# Patient Record
Sex: MALE | Race: ASIAN | NOT HISPANIC OR LATINO | URBAN - METROPOLITAN AREA
[De-identification: names, ages, dates, MRNs, and addresses within clinical notes are randomized per-mention and may not be internally consistent; named-entity substitution may affect disease eponyms.]

---

## 2023-02-09 ENCOUNTER — INPATIENT (INPATIENT)
Facility: HOSPITAL | Age: 68
LOS: 8 days | Discharge: HOME HEALTH | DRG: 871 | End: 2023-02-18
Attending: INTERNAL MEDICINE | Admitting: INTERNAL MEDICINE
Payer: COMMERCIAL

## 2023-02-09 VITALS
OXYGEN SATURATION: 95 % | WEIGHT: 130.07 LBS | RESPIRATION RATE: 20 BRPM | HEIGHT: 68 IN | TEMPERATURE: 99 F | DIASTOLIC BLOOD PRESSURE: 62 MMHG | SYSTOLIC BLOOD PRESSURE: 91 MMHG | HEART RATE: 118 BPM

## 2023-02-09 PROCEDURE — 99285 EMERGENCY DEPT VISIT HI MDM: CPT

## 2023-02-09 PROCEDURE — 93010 ELECTROCARDIOGRAM REPORT: CPT

## 2023-02-09 RX ORDER — SODIUM CHLORIDE 9 MG/ML
1000 INJECTION INTRAMUSCULAR; INTRAVENOUS; SUBCUTANEOUS ONCE
Refills: 0 | Status: COMPLETED | OUTPATIENT
Start: 2023-02-09 | End: 2023-02-09

## 2023-02-09 NOTE — ED ADULT TRIAGE NOTE - CHIEF COMPLAINT QUOTE
Pt BIBA for generalized weakness, had flu symptoms 1 week ago and was vomiting that started 2 days ago. As per EMS fingerstick 584mg/dl . Pt has history of DM and bypass.

## 2023-02-10 DIAGNOSIS — E11.00 TYPE 2 DIABETES MELLITUS WITH HYPEROSMOLARITY WITHOUT NONKETOTIC HYPERGLYCEMIC-HYPEROSMOLAR COMA (NKHHC): ICD-10-CM

## 2023-02-10 LAB
ACETONE SERPL-MCNC: NEGATIVE — SIGNIFICANT CHANGE UP
ALBUMIN SERPL ELPH-MCNC: 1.8 G/DL — LOW (ref 3.5–5)
ALBUMIN SERPL ELPH-MCNC: 1.8 G/DL — LOW (ref 3.5–5)
ALBUMIN SERPL ELPH-MCNC: 2.5 G/DL — LOW (ref 3.5–5)
ALP SERPL-CCNC: 143 U/L — HIGH (ref 40–120)
ALP SERPL-CCNC: 145 U/L — HIGH (ref 40–120)
ALP SERPL-CCNC: 198 U/L — HIGH (ref 40–120)
ALT FLD-CCNC: 63 U/L DA — HIGH (ref 10–60)
ALT FLD-CCNC: 68 U/L DA — HIGH (ref 10–60)
ALT FLD-CCNC: 90 U/L DA — HIGH (ref 10–60)
AMMONIA BLD-MCNC: 53 UMOL/L — HIGH (ref 11–32)
ANION GAP SERPL CALC-SCNC: 10 MMOL/L — SIGNIFICANT CHANGE UP (ref 5–17)
ANION GAP SERPL CALC-SCNC: 11 MMOL/L — SIGNIFICANT CHANGE UP (ref 5–17)
ANION GAP SERPL CALC-SCNC: 13 MMOL/L — SIGNIFICANT CHANGE UP (ref 5–17)
ANION GAP SERPL CALC-SCNC: 14 MMOL/L — SIGNIFICANT CHANGE UP (ref 5–17)
ANION GAP SERPL CALC-SCNC: 5 MMOL/L — SIGNIFICANT CHANGE UP (ref 5–17)
ANION GAP SERPL CALC-SCNC: 5 MMOL/L — SIGNIFICANT CHANGE UP (ref 5–17)
ANION GAP SERPL CALC-SCNC: 6 MMOL/L — SIGNIFICANT CHANGE UP (ref 5–17)
APPEARANCE UR: CLEAR — SIGNIFICANT CHANGE UP
APTT BLD: 34.5 SEC — SIGNIFICANT CHANGE UP (ref 27.5–35.5)
AST SERPL-CCNC: 27 U/L — SIGNIFICANT CHANGE UP (ref 10–40)
AST SERPL-CCNC: 28 U/L — SIGNIFICANT CHANGE UP (ref 10–40)
AST SERPL-CCNC: 36 U/L — SIGNIFICANT CHANGE UP (ref 10–40)
BASE EXCESS BLDV CALC-SCNC: 8 MMOL/L — SIGNIFICANT CHANGE UP
BASOPHILS # BLD AUTO: 0 K/UL — SIGNIFICANT CHANGE UP (ref 0–0.2)
BASOPHILS NFR BLD AUTO: 0 % — SIGNIFICANT CHANGE UP (ref 0–2)
BILIRUB DIRECT SERPL-MCNC: 12.7 MG/DL — HIGH (ref 0–0.3)
BILIRUB DIRECT SERPL-MCNC: 8.6 MG/DL — HIGH (ref 0–0.3)
BILIRUB INDIRECT FLD-MCNC: 1.6 MG/DL — HIGH (ref 0.2–1)
BILIRUB INDIRECT FLD-MCNC: 1.8 MG/DL — HIGH (ref 0.2–1)
BILIRUB SERPL-MCNC: 10.2 MG/DL — HIGH (ref 0.2–1.2)
BILIRUB SERPL-MCNC: 10.2 MG/DL — HIGH (ref 0.2–1.2)
BILIRUB SERPL-MCNC: 10.6 MG/DL — HIGH (ref 0.2–1.2)
BILIRUB SERPL-MCNC: 14.5 MG/DL — HIGH (ref 0.2–1.2)
BILIRUB SERPL-MCNC: 14.5 MG/DL — HIGH (ref 0.2–1.2)
BILIRUB UR-MCNC: ABNORMAL
BUN SERPL-MCNC: 109 MG/DL — HIGH (ref 7–18)
BUN SERPL-MCNC: 113 MG/DL — HIGH (ref 7–18)
BUN SERPL-MCNC: 131 MG/DL — HIGH (ref 7–18)
BUN SERPL-MCNC: 82 MG/DL — HIGH (ref 7–18)
BUN SERPL-MCNC: 85 MG/DL — HIGH (ref 7–18)
BUN SERPL-MCNC: 90 MG/DL — HIGH (ref 7–18)
BUN SERPL-MCNC: 98 MG/DL — HIGH (ref 7–18)
CALCIUM SERPL-MCNC: 7 MG/DL — LOW (ref 8.4–10.5)
CALCIUM SERPL-MCNC: 7.3 MG/DL — LOW (ref 8.4–10.5)
CALCIUM SERPL-MCNC: 7.3 MG/DL — LOW (ref 8.4–10.5)
CALCIUM SERPL-MCNC: 7.4 MG/DL — LOW (ref 8.4–10.5)
CALCIUM SERPL-MCNC: 7.5 MG/DL — LOW (ref 8.4–10.5)
CALCIUM SERPL-MCNC: 8 MG/DL — LOW (ref 8.4–10.5)
CALCIUM SERPL-MCNC: 9.5 MG/DL — SIGNIFICANT CHANGE UP (ref 8.4–10.5)
CHLORIDE SERPL-SCNC: 110 MMOL/L — HIGH (ref 96–108)
CHLORIDE SERPL-SCNC: 112 MMOL/L — HIGH (ref 96–108)
CHLORIDE SERPL-SCNC: 119 MMOL/L — HIGH (ref 96–108)
CHLORIDE SERPL-SCNC: 119 MMOL/L — HIGH (ref 96–108)
CHLORIDE SERPL-SCNC: 121 MMOL/L — HIGH (ref 96–108)
CHLORIDE SERPL-SCNC: 123 MMOL/L — HIGH (ref 96–108)
CHLORIDE SERPL-SCNC: 98 MMOL/L — SIGNIFICANT CHANGE UP (ref 96–108)
CO2 SERPL-SCNC: 25 MMOL/L — SIGNIFICANT CHANGE UP (ref 22–31)
CO2 SERPL-SCNC: 25 MMOL/L — SIGNIFICANT CHANGE UP (ref 22–31)
CO2 SERPL-SCNC: 27 MMOL/L — SIGNIFICANT CHANGE UP (ref 22–31)
CO2 SERPL-SCNC: 28 MMOL/L — SIGNIFICANT CHANGE UP (ref 22–31)
CO2 SERPL-SCNC: 28 MMOL/L — SIGNIFICANT CHANGE UP (ref 22–31)
CO2 SERPL-SCNC: 29 MMOL/L — SIGNIFICANT CHANGE UP (ref 22–31)
CO2 SERPL-SCNC: 30 MMOL/L — SIGNIFICANT CHANGE UP (ref 22–31)
COLOR SPEC: YELLOW — SIGNIFICANT CHANGE UP
CREAT SERPL-MCNC: 1.95 MG/DL — HIGH (ref 0.5–1.3)
CREAT SERPL-MCNC: 2.09 MG/DL — HIGH (ref 0.5–1.3)
CREAT SERPL-MCNC: 2.25 MG/DL — HIGH (ref 0.5–1.3)
CREAT SERPL-MCNC: 2.42 MG/DL — HIGH (ref 0.5–1.3)
CREAT SERPL-MCNC: 2.9 MG/DL — HIGH (ref 0.5–1.3)
CREAT SERPL-MCNC: 3.12 MG/DL — HIGH (ref 0.5–1.3)
CREAT SERPL-MCNC: 3.84 MG/DL — HIGH (ref 0.5–1.3)
DIFF PNL FLD: ABNORMAL
EGFR: 16 ML/MIN/1.73M2 — LOW
EGFR: 21 ML/MIN/1.73M2 — LOW
EGFR: 23 ML/MIN/1.73M2 — LOW
EGFR: 28 ML/MIN/1.73M2 — LOW
EGFR: 31 ML/MIN/1.73M2 — LOW
EGFR: 34 ML/MIN/1.73M2 — LOW
EGFR: 37 ML/MIN/1.73M2 — LOW
EOSINOPHIL # BLD AUTO: 0 K/UL — SIGNIFICANT CHANGE UP (ref 0–0.5)
EOSINOPHIL NFR BLD AUTO: 0 % — SIGNIFICANT CHANGE UP (ref 0–6)
FERRITIN SERPL-MCNC: 566 NG/ML — HIGH (ref 30–400)
FOLATE SERPL-MCNC: >20 NG/ML — SIGNIFICANT CHANGE UP
GLUCOSE SERPL-MCNC: 1052 MG/DL — CRITICAL HIGH (ref 70–99)
GLUCOSE SERPL-MCNC: 283 MG/DL — HIGH (ref 70–99)
GLUCOSE SERPL-MCNC: 365 MG/DL — HIGH (ref 70–99)
GLUCOSE SERPL-MCNC: 479 MG/DL — CRITICAL HIGH (ref 70–99)
GLUCOSE SERPL-MCNC: 580 MG/DL — CRITICAL HIGH (ref 70–99)
GLUCOSE SERPL-MCNC: 757 MG/DL — CRITICAL HIGH (ref 70–99)
GLUCOSE SERPL-MCNC: 791 MG/DL — CRITICAL HIGH (ref 70–99)
GLUCOSE UR QL: 1000 MG/DL
HCO3 BLDV-SCNC: 31 MMOL/L — HIGH (ref 22–29)
HCT VFR BLD CALC: 39.2 % — SIGNIFICANT CHANGE UP (ref 39–50)
HCT VFR BLD CALC: 45.6 % — SIGNIFICANT CHANGE UP (ref 39–50)
HCV AB S/CO SERPL IA: 0.09 S/CO — SIGNIFICANT CHANGE UP (ref 0–0.99)
HCV AB SERPL-IMP: SIGNIFICANT CHANGE UP
HGB BLD-MCNC: 13.3 G/DL — SIGNIFICANT CHANGE UP (ref 13–17)
HGB BLD-MCNC: 15.5 G/DL — SIGNIFICANT CHANGE UP (ref 13–17)
HOROWITZ INDEX BLDV+IHG-RTO: 21 — SIGNIFICANT CHANGE UP
INR BLD: 1.11 RATIO — SIGNIFICANT CHANGE UP (ref 0.88–1.16)
IRON SATN MFR SERPL: 108 UG/DL — SIGNIFICANT CHANGE UP (ref 65–170)
IRON SATN MFR SERPL: 56 % — HIGH (ref 20–55)
KETONES UR-MCNC: ABNORMAL
LACTATE SERPL-SCNC: 2.1 MMOL/L — HIGH (ref 0.7–2)
LACTATE SERPL-SCNC: 2.2 MMOL/L — HIGH (ref 0.7–2)
LEUKOCYTE ESTERASE UR-ACNC: NEGATIVE — SIGNIFICANT CHANGE UP
LIDOCAIN IGE QN: 459 U/L — HIGH (ref 73–393)
LYMPHOCYTES # BLD AUTO: 1.24 K/UL — SIGNIFICANT CHANGE UP (ref 1–3.3)
LYMPHOCYTES # BLD AUTO: 14 % — SIGNIFICANT CHANGE UP (ref 13–44)
MAGNESIUM SERPL-MCNC: 1.8 MG/DL — SIGNIFICANT CHANGE UP (ref 1.6–2.6)
MAGNESIUM SERPL-MCNC: 2.2 MG/DL — SIGNIFICANT CHANGE UP (ref 1.6–2.6)
MAGNESIUM SERPL-MCNC: 2.4 MG/DL — SIGNIFICANT CHANGE UP (ref 1.6–2.6)
MAGNESIUM SERPL-MCNC: 2.8 MG/DL — HIGH (ref 1.6–2.6)
MCHC RBC-ENTMCNC: 31.5 PG — SIGNIFICANT CHANGE UP (ref 27–34)
MCHC RBC-ENTMCNC: 32.1 PG — SIGNIFICANT CHANGE UP (ref 27–34)
MCHC RBC-ENTMCNC: 33.9 GM/DL — SIGNIFICANT CHANGE UP (ref 32–36)
MCHC RBC-ENTMCNC: 34 GM/DL — SIGNIFICANT CHANGE UP (ref 32–36)
MCV RBC AUTO: 92.9 FL — SIGNIFICANT CHANGE UP (ref 80–100)
MCV RBC AUTO: 94.4 FL — SIGNIFICANT CHANGE UP (ref 80–100)
MONOCYTES # BLD AUTO: 0.98 K/UL — HIGH (ref 0–0.9)
MONOCYTES NFR BLD AUTO: 11 % — SIGNIFICANT CHANGE UP (ref 2–14)
MRSA PCR RESULT.: SIGNIFICANT CHANGE UP
NEUTROPHILS # BLD AUTO: 6.65 K/UL — SIGNIFICANT CHANGE UP (ref 1.8–7.4)
NEUTROPHILS NFR BLD AUTO: 75 % — SIGNIFICANT CHANGE UP (ref 43–77)
NITRITE UR-MCNC: NEGATIVE — SIGNIFICANT CHANGE UP
NRBC # BLD: 0 /100 WBCS — SIGNIFICANT CHANGE UP (ref 0–0)
NT-PROBNP SERPL-SCNC: 1228 PG/ML — HIGH (ref 0–125)
OSMOLALITY SERPL: 399 MOSMOL/KG — HIGH (ref 280–301)
PCO2 BLDV: 35 MMHG — LOW (ref 42–55)
PH BLDV: 7.55 — HIGH (ref 7.32–7.43)
PH UR: 5 — SIGNIFICANT CHANGE UP (ref 5–8)
PHOSPHATE SERPL-MCNC: 1.8 MG/DL — LOW (ref 2.5–4.5)
PHOSPHATE SERPL-MCNC: 2.7 MG/DL — SIGNIFICANT CHANGE UP (ref 2.5–4.5)
PHOSPHATE SERPL-MCNC: 5.1 MG/DL — HIGH (ref 2.5–4.5)
PLATELET # BLD AUTO: 55 K/UL — LOW (ref 150–400)
PLATELET # BLD AUTO: 55 K/UL — LOW (ref 150–400)
PO2 BLDV: 79 MMHG — SIGNIFICANT CHANGE UP
POTASSIUM SERPL-MCNC: 2.5 MMOL/L — CRITICAL LOW (ref 3.5–5.3)
POTASSIUM SERPL-MCNC: 2.6 MMOL/L — CRITICAL LOW (ref 3.5–5.3)
POTASSIUM SERPL-MCNC: 2.6 MMOL/L — CRITICAL LOW (ref 3.5–5.3)
POTASSIUM SERPL-MCNC: 3.1 MMOL/L — LOW (ref 3.5–5.3)
POTASSIUM SERPL-MCNC: 3.3 MMOL/L — LOW (ref 3.5–5.3)
POTASSIUM SERPL-MCNC: 3.6 MMOL/L — SIGNIFICANT CHANGE UP (ref 3.5–5.3)
POTASSIUM SERPL-MCNC: 6 MMOL/L — HIGH (ref 3.5–5.3)
POTASSIUM SERPL-SCNC: 2.5 MMOL/L — CRITICAL LOW (ref 3.5–5.3)
POTASSIUM SERPL-SCNC: 2.6 MMOL/L — CRITICAL LOW (ref 3.5–5.3)
POTASSIUM SERPL-SCNC: 2.6 MMOL/L — CRITICAL LOW (ref 3.5–5.3)
POTASSIUM SERPL-SCNC: 3.1 MMOL/L — LOW (ref 3.5–5.3)
POTASSIUM SERPL-SCNC: 3.3 MMOL/L — LOW (ref 3.5–5.3)
POTASSIUM SERPL-SCNC: 3.6 MMOL/L — SIGNIFICANT CHANGE UP (ref 3.5–5.3)
POTASSIUM SERPL-SCNC: 6 MMOL/L — HIGH (ref 3.5–5.3)
PROT SERPL-MCNC: 5.1 G/DL — LOW (ref 6–8.3)
PROT SERPL-MCNC: 5.1 G/DL — LOW (ref 6–8.3)
PROT SERPL-MCNC: 6.9 G/DL — SIGNIFICANT CHANGE UP (ref 6–8.3)
PROT UR-MCNC: 15
PROTHROM AB SERPL-ACNC: 13.2 SEC — SIGNIFICANT CHANGE UP (ref 10.5–13.4)
RBC # BLD: 4.22 M/UL — SIGNIFICANT CHANGE UP (ref 4.2–5.8)
RBC # BLD: 4.83 M/UL — SIGNIFICANT CHANGE UP (ref 4.2–5.8)
RBC # FLD: 12.8 % — SIGNIFICANT CHANGE UP (ref 10.3–14.5)
RBC # FLD: 12.9 % — SIGNIFICANT CHANGE UP (ref 10.3–14.5)
S AUREUS DNA NOSE QL NAA+PROBE: SIGNIFICANT CHANGE UP
SAO2 % BLDV: 95.8 % — SIGNIFICANT CHANGE UP
SARS-COV-2 RNA SPEC QL NAA+PROBE: SIGNIFICANT CHANGE UP
SODIUM SERPL-SCNC: 140 MMOL/L — SIGNIFICANT CHANGE UP (ref 135–145)
SODIUM SERPL-SCNC: 145 MMOL/L — SIGNIFICANT CHANGE UP (ref 135–145)
SODIUM SERPL-SCNC: 150 MMOL/L — HIGH (ref 135–145)
SODIUM SERPL-SCNC: 154 MMOL/L — HIGH (ref 135–145)
SODIUM SERPL-SCNC: 155 MMOL/L — HIGH (ref 135–145)
SODIUM SERPL-SCNC: 155 MMOL/L — HIGH (ref 135–145)
SODIUM SERPL-SCNC: 159 MMOL/L — HIGH (ref 135–145)
SP GR SPEC: 1.01 — SIGNIFICANT CHANGE UP (ref 1.01–1.02)
TIBC SERPL-MCNC: 191 UG/DL — LOW (ref 250–450)
TRANSFERRIN SERPL-MCNC: 162 MG/DL — LOW (ref 200–360)
TROPONIN I, HIGH SENSITIVITY RESULT: 42 NG/L — SIGNIFICANT CHANGE UP
TSH SERPL-MCNC: 0.08 UU/ML — LOW (ref 0.34–4.82)
UIBC SERPL-MCNC: 83 UG/DL — LOW (ref 110–370)
UROBILINOGEN FLD QL: 1
VIT B12 SERPL-MCNC: >2000 PG/ML — HIGH (ref 232–1245)
WBC # BLD: 7.51 K/UL — SIGNIFICANT CHANGE UP (ref 3.8–10.5)
WBC # BLD: 8.87 K/UL — SIGNIFICANT CHANGE UP (ref 3.8–10.5)
WBC # FLD AUTO: 7.51 K/UL — SIGNIFICANT CHANGE UP (ref 3.8–10.5)
WBC # FLD AUTO: 8.87 K/UL — SIGNIFICANT CHANGE UP (ref 3.8–10.5)

## 2023-02-10 PROCEDURE — 99291 CRITICAL CARE FIRST HOUR: CPT | Mod: GC

## 2023-02-10 PROCEDURE — 99222 1ST HOSP IP/OBS MODERATE 55: CPT

## 2023-02-10 PROCEDURE — 74176 CT ABD & PELVIS W/O CONTRAST: CPT | Mod: 26

## 2023-02-10 PROCEDURE — 71045 X-RAY EXAM CHEST 1 VIEW: CPT | Mod: 26

## 2023-02-10 RX ORDER — SODIUM CHLORIDE 9 MG/ML
1000 INJECTION, SOLUTION INTRAVENOUS
Refills: 0 | Status: DISCONTINUED | OUTPATIENT
Start: 2023-02-10 | End: 2023-02-10

## 2023-02-10 RX ORDER — POTASSIUM CHLORIDE 20 MEQ
40 PACKET (EA) ORAL EVERY 4 HOURS
Refills: 0 | Status: COMPLETED | OUTPATIENT
Start: 2023-02-10 | End: 2023-02-11

## 2023-02-10 RX ORDER — POTASSIUM CHLORIDE 20 MEQ
10 PACKET (EA) ORAL
Refills: 0 | Status: DISCONTINUED | OUTPATIENT
Start: 2023-02-10 | End: 2023-02-10

## 2023-02-10 RX ORDER — INSULIN LISPRO 100/ML
VIAL (ML) SUBCUTANEOUS EVERY 4 HOURS
Refills: 0 | Status: DISCONTINUED | OUTPATIENT
Start: 2023-02-10 | End: 2023-02-11

## 2023-02-10 RX ORDER — POTASSIUM CHLORIDE 20 MEQ
40 PACKET (EA) ORAL ONCE
Refills: 0 | Status: COMPLETED | OUTPATIENT
Start: 2023-02-10 | End: 2023-02-10

## 2023-02-10 RX ORDER — DEXTROSE 50 % IN WATER 50 %
25 SYRINGE (ML) INTRAVENOUS
Refills: 0 | Status: DISCONTINUED | OUTPATIENT
Start: 2023-02-10 | End: 2023-02-11

## 2023-02-10 RX ORDER — CHLORHEXIDINE GLUCONATE 213 G/1000ML
1 SOLUTION TOPICAL
Refills: 0 | Status: DISCONTINUED | OUTPATIENT
Start: 2023-02-10 | End: 2023-02-11

## 2023-02-10 RX ORDER — POTASSIUM CHLORIDE 20 MEQ
10 PACKET (EA) ORAL
Refills: 0 | Status: COMPLETED | OUTPATIENT
Start: 2023-02-10 | End: 2023-02-10

## 2023-02-10 RX ORDER — CEFTRIAXONE 500 MG/1
1000 INJECTION, POWDER, FOR SOLUTION INTRAMUSCULAR; INTRAVENOUS EVERY 24 HOURS
Refills: 0 | Status: COMPLETED | OUTPATIENT
Start: 2023-02-11 | End: 2023-02-15

## 2023-02-10 RX ORDER — POTASSIUM CHLORIDE 20 MEQ
10 PACKET (EA) ORAL ONCE
Refills: 0 | Status: COMPLETED | OUTPATIENT
Start: 2023-02-10 | End: 2023-02-10

## 2023-02-10 RX ORDER — CEFTRIAXONE 500 MG/1
INJECTION, POWDER, FOR SOLUTION INTRAMUSCULAR; INTRAVENOUS
Refills: 0 | Status: COMPLETED | OUTPATIENT
Start: 2023-02-10 | End: 2023-02-16

## 2023-02-10 RX ORDER — SODIUM CHLORIDE 9 MG/ML
1000 INJECTION, SOLUTION INTRAVENOUS
Refills: 0 | Status: DISCONTINUED | OUTPATIENT
Start: 2023-02-10 | End: 2023-02-11

## 2023-02-10 RX ORDER — ATORVASTATIN CALCIUM 80 MG/1
20 TABLET, FILM COATED ORAL AT BEDTIME
Refills: 0 | Status: DISCONTINUED | OUTPATIENT
Start: 2023-02-10 | End: 2023-02-12

## 2023-02-10 RX ORDER — SODIUM CHLORIDE 9 MG/ML
1000 INJECTION, SOLUTION INTRAVENOUS ONCE
Refills: 0 | Status: COMPLETED | OUTPATIENT
Start: 2023-02-10 | End: 2023-02-10

## 2023-02-10 RX ORDER — GLUCAGON INJECTION, SOLUTION 0.5 MG/.1ML
1 INJECTION, SOLUTION SUBCUTANEOUS ONCE
Refills: 0 | Status: DISCONTINUED | OUTPATIENT
Start: 2023-02-10 | End: 2023-02-11

## 2023-02-10 RX ORDER — SODIUM CHLORIDE 9 MG/ML
2000 INJECTION INTRAMUSCULAR; INTRAVENOUS; SUBCUTANEOUS ONCE
Refills: 0 | Status: COMPLETED | OUTPATIENT
Start: 2023-02-10 | End: 2023-02-10

## 2023-02-10 RX ORDER — NIFEDIPINE 30 MG
30 TABLET, EXTENDED RELEASE 24 HR ORAL DAILY
Refills: 0 | Status: DISCONTINUED | OUTPATIENT
Start: 2023-02-10 | End: 2023-02-18

## 2023-02-10 RX ORDER — DEXTROSE 50 % IN WATER 50 %
15 SYRINGE (ML) INTRAVENOUS ONCE
Refills: 0 | Status: DISCONTINUED | OUTPATIENT
Start: 2023-02-10 | End: 2023-02-11

## 2023-02-10 RX ORDER — POTASSIUM PHOSPHATE, MONOBASIC POTASSIUM PHOSPHATE, DIBASIC 236; 224 MG/ML; MG/ML
30 INJECTION, SOLUTION INTRAVENOUS ONCE
Refills: 0 | Status: COMPLETED | OUTPATIENT
Start: 2023-02-10 | End: 2023-02-10

## 2023-02-10 RX ORDER — POTASSIUM CHLORIDE 20 MEQ
40 PACKET (EA) ORAL EVERY 4 HOURS
Refills: 0 | Status: DISCONTINUED | OUTPATIENT
Start: 2023-02-10 | End: 2023-02-10

## 2023-02-10 RX ORDER — ALLOPURINOL 300 MG
0 TABLET ORAL
Qty: 0 | Refills: 0 | DISCHARGE

## 2023-02-10 RX ORDER — SODIUM CHLORIDE 9 MG/ML
1000 INJECTION INTRAMUSCULAR; INTRAVENOUS; SUBCUTANEOUS ONCE
Refills: 0 | Status: COMPLETED | OUTPATIENT
Start: 2023-02-10 | End: 2023-02-10

## 2023-02-10 RX ORDER — CEFTRIAXONE 500 MG/1
1000 INJECTION, POWDER, FOR SOLUTION INTRAMUSCULAR; INTRAVENOUS ONCE
Refills: 0 | Status: COMPLETED | OUTPATIENT
Start: 2023-02-10 | End: 2023-02-10

## 2023-02-10 RX ORDER — MAGNESIUM SULFATE 500 MG/ML
2 VIAL (ML) INJECTION ONCE
Refills: 0 | Status: COMPLETED | OUTPATIENT
Start: 2023-02-10 | End: 2023-02-10

## 2023-02-10 RX ORDER — DEXTROSE 50 % IN WATER 50 %
50 SYRINGE (ML) INTRAVENOUS
Refills: 0 | Status: DISCONTINUED | OUTPATIENT
Start: 2023-02-10 | End: 2023-02-11

## 2023-02-10 RX ORDER — ICOSAPENT ETHYL 500 MG/1
2 CAPSULE, LIQUID FILLED ORAL
Qty: 0 | Refills: 0 | DISCHARGE

## 2023-02-10 RX ORDER — RIVAROXABAN 15 MG-20MG
1 KIT ORAL
Qty: 0 | Refills: 0 | DISCHARGE

## 2023-02-10 RX ORDER — BEMPEDOIC ACID 180 MG/1
1 TABLET, FILM COATED ORAL
Qty: 0 | Refills: 0 | DISCHARGE

## 2023-02-10 RX ORDER — ALLOPURINOL 300 MG
100 TABLET ORAL DAILY
Refills: 0 | Status: DISCONTINUED | OUTPATIENT
Start: 2023-02-10 | End: 2023-02-15

## 2023-02-10 RX ADMIN — SODIUM CHLORIDE 2000 MILLILITER(S): 9 INJECTION INTRAMUSCULAR; INTRAVENOUS; SUBCUTANEOUS at 02:11

## 2023-02-10 RX ADMIN — Medication 100 MILLIEQUIVALENT(S): at 02:10

## 2023-02-10 RX ADMIN — Medication 40 MILLIEQUIVALENT(S): at 06:35

## 2023-02-10 RX ADMIN — CHLORHEXIDINE GLUCONATE 1 APPLICATION(S): 213 SOLUTION TOPICAL at 06:34

## 2023-02-10 RX ADMIN — Medication 100 MILLIGRAM(S): at 21:00

## 2023-02-10 RX ADMIN — POTASSIUM PHOSPHATE, MONOBASIC POTASSIUM PHOSPHATE, DIBASIC 83.33 MILLIMOLE(S): 236; 224 INJECTION, SOLUTION INTRAVENOUS at 21:00

## 2023-02-10 RX ADMIN — Medication 30 MILLIGRAM(S): at 21:00

## 2023-02-10 RX ADMIN — ATORVASTATIN CALCIUM 20 MILLIGRAM(S): 80 TABLET, FILM COATED ORAL at 21:09

## 2023-02-10 RX ADMIN — Medication 40 MILLIEQUIVALENT(S): at 05:32

## 2023-02-10 RX ADMIN — Medication 10: at 17:20

## 2023-02-10 RX ADMIN — CEFTRIAXONE 100 MILLIGRAM(S): 500 INJECTION, POWDER, FOR SOLUTION INTRAMUSCULAR; INTRAVENOUS at 18:32

## 2023-02-10 RX ADMIN — SODIUM CHLORIDE 1000 MILLILITER(S): 9 INJECTION, SOLUTION INTRAVENOUS at 02:59

## 2023-02-10 RX ADMIN — Medication 100 MILLIEQUIVALENT(S): at 03:04

## 2023-02-10 RX ADMIN — Medication 6: at 21:15

## 2023-02-10 RX ADMIN — Medication 100 MILLIEQUIVALENT(S): at 06:35

## 2023-02-10 RX ADMIN — Medication 25 GRAM(S): at 10:32

## 2023-02-10 RX ADMIN — Medication 100 MILLIEQUIVALENT(S): at 09:05

## 2023-02-10 RX ADMIN — SODIUM CHLORIDE 1000 MILLILITER(S): 9 INJECTION INTRAMUSCULAR; INTRAVENOUS; SUBCUTANEOUS at 02:09

## 2023-02-10 RX ADMIN — SODIUM CHLORIDE 150 MILLILITER(S): 9 INJECTION, SOLUTION INTRAVENOUS at 07:28

## 2023-02-10 RX ADMIN — Medication 100 MILLIEQUIVALENT(S): at 05:31

## 2023-02-10 RX ADMIN — SODIUM CHLORIDE 150 MILLILITER(S): 9 INJECTION, SOLUTION INTRAVENOUS at 10:32

## 2023-02-10 RX ADMIN — Medication 40 MILLIEQUIVALENT(S): at 21:07

## 2023-02-10 RX ADMIN — Medication 100 MILLIEQUIVALENT(S): at 11:07

## 2023-02-10 RX ADMIN — Medication 12: at 14:33

## 2023-02-10 RX ADMIN — Medication 100 MILLIEQUIVALENT(S): at 10:04

## 2023-02-10 RX ADMIN — SODIUM CHLORIDE 1000 MILLILITER(S): 9 INJECTION INTRAMUSCULAR; INTRAVENOUS; SUBCUTANEOUS at 00:30

## 2023-02-10 RX ADMIN — Medication 40 MILLIEQUIVALENT(S): at 08:44

## 2023-02-10 NOTE — CONSULT NOTE ADULT - SUBJECTIVE AND OBJECTIVE BOX
[facility]    Patient is a 67y old  Male who presents with a chief complaint of hyperosmolar hyperglycemic state (10 Feb 2023 02:52)    HPI:  67 year old male PMH CAD s/p CABG, DM, HLD coming in with jaundice and generalized body aches. Patient is lethargic and unable to provide much history. As per ED provider, " his symptoms started about 5 days ago initially with flu-like symptoms and vomiting. went to PMD and was given a nausea medication and felt improved but now feeling worse again. daughter states jaundice is also getting worse. has lost about 10-15lbs in the past week."     (10 Feb 2023 02:52)    PMH/PSH:  PAST MEDICAL & SURGICAL HISTORY:  CAD (coronary artery disease)      Diabetes mellitus      HLD (hyperlipidemia)        FH:  FAMILY HISTORY:      MEDS:  MEDICATIONS  (STANDING):  chlorhexidine 2% Cloths 1 Application(s) Topical <User Schedule>  lactated ringers 1000 milliLiter(s) (150 mL/Hr) IV Continuous <Continuous>  potassium chloride  10 mEq/100 mL IVPB 10 milliEquivalent(s) IV Intermittent every 1 hour    MEDICATIONS  (PRN):    Allergies    No Known Allergies    Intolerances            CONSTITUTIONAL:  No weight loss, fever, chills, weakness or fatigue.  HEENT:  Eyes:  No visual loss, blurred vision, double vision or yellow sclerae. Ears, Nose, Throat:  No hearing loss, sneezing, congestion, runny nose or sore throat.  SKIN:  No rash or itching.  CARDIOVASCULAR:  No chest pain, chest pressure or chest discomfort. No palpitations or edema.  RESPIRATORY:  No shortness of breath, cough or sputum.  GASTROINTESTINAL:  SEE HPI  GENITOURINARY:  No dysuria, hematuria, urinary frequency  NEUROLOGICAL:  No headache, dizziness, syncope, paralysis, ataxia, numbness or tingling in the extremities. No change in bowel or bladder control.  MUSCULOSKELETAL:  No muscle, back pain, joint pain or stiffness.        ______________________________________________________________________  PHYSICAL EXAM:  T(C): 35.7 (02-10-23 @ 06:18), Max: 37.1 (02-09-23 @ 22:42)  HR: 77 (02-10-23 @ 10:00)  BP: 126/70 (02-10-23 @ 10:00)  RR: 15 (02-10-23 @ 10:00)  SpO2: 97% (02-10-23 @ 10:00)  Wt(kg): --    02-09  -  02-10  --------------------------------------------------------  IN:    IV PiggyBack: 100 mL    sodium chloride 0.45%: 150 mL  Total IN: 250 mL    OUT:    Indwelling Catheter - Urethral (mL): 950 mL  Total OUT: 950 mL    Total NET: -700 mL      02-10  -  02-10  --------------------------------------------------------  IN:    IV PiggyBack: 200 mL    Lactated Ringers w/ Additives: 150 mL    Oral Fluid: 120 mL    sodium chloride 0.45%: 300 mL  Total IN: 770 mL    OUT:    Indwelling Catheter - Urethral (mL): 650 mL  Total OUT: 650 mL    Total NET: 120 mL          GEN: NAD, normocephalic.  HEENT: sclera icteric   CVS: S1S2+  CHEST: clear to auscultation  ABD: soft , nontender, nondistended, bowel sounds present  EXTR: no cyanosis, no clubbing, no edema  NEURO: Awake and alert; oriented x2  SKIN:  warm;  generalized  icteric    ______________________________________________________________________  LABS:                        15.5   8.87  )-----------( 55       ( 10 Feb 2023 00:23 )             45.6     02-10    155<H>  |  119<H>  |  98<H>  ----------------------------<  580<HH>  2.5<LL>   |  25  |  2.42<H>    Ca    7.3<L>      10 Feb 2023 06:30  Phos  5.1     02-10  Mg     1.8     02-10    TPro  5.1<L>  /  Alb  1.8<L>  /  TBili  10.6<H>  /  DBili  x   /  AST  27  /  ALT  68<H>  /  AlkPhos  145<H>  02-10    LIVER FUNCTIONS - ( 10 Feb 2023 02:45 )  Alb: 1.8 g/dL / Pro: 5.1 g/dL / ALK PHOS: 145 U/L / ALT: 68 U/L DA / AST: 27 U/L / GGT: x           PT/INR - ( 10 Feb 2023 00:23 )   PT: 13.2 sec;   INR: 1.11 ratio         PTT - ( 10 Feb 2023 00:23 )  PTT:34.5 sec  ____________________________________________    IMAGING:    ______________________________________________________________________  < from: CT Abdomen and Pelvis No Cont (02.10.23 @ 05:54) >  ACC: [id number] EXAM:  CT ABDOMEN AND PELVIS   ORDERED BY: DOMINGA PLASENCIA     PROCEDURE DATE:  02/10/2023          INTERPRETATION:  CLINICAL INFORMATION: Jaundice and vomiting.    COMPARISON: None.    CONTRAST/COMPLICATIONS:  IV Contrast: NONE  Evaluation of the visceral organs is limited without   intravenous contrast  Oral Contrast: NONE  Complications: None reported at time of study completion    PROCEDURE:  CT of the Abdomen and Pelvis was performed.  Sagittal and coronal reformats were performed.    FINDINGS:  LOWER CHEST: Dependent airspace opacities in bilateral lower lobes.   Branching "tree in bud" opacities in the right middle lobe. Coronary   artery stents.    LIVER: Within normal limits.  BILE DUCTS: Normal caliber.  GALLBLADDER: Within normal limits.  SPLEEN: Within normal limits.  PANCREAS: 3 mm cystic lesion in the body of the pancreas.  ADRENALS: Within normal limits.  KIDNEYS/URETERS: Within normal limits.    BLADDER: Lion catheter balloon within the urinary bladder.   Intravesicular gas secondary to instrumentation.  REPRODUCTIVE ORGANS: Enlarged prostate to 5.6 cm.    BOWEL: Small second and third portion duodenal diverticula. No bowel   obstruction or inflammation. Appendix is normal.  PERITONEUM: No ascites.  VESSELS: Within normal limits.  RETROPERITONEUM/LYMPH NODES: No lymphadenopathy.  ABDOMINAL WALL: Within normal limits.  BONES: Mild degenerative changes of the spine. Bilateral L5 pars   interarticularis defects without spondylolisthesis.    IMPRESSION:  1. No bowel obstruction or inflammation.  2. Tiny cystic lesion in the body of the pancreas. A follow-up   contrast-enhanced MR pancreas protocol is recommended in 2 years.  3. Branching "tree in bud" opacities in the right middle lobe likely   infectious in etiology. Dependent airspace opacities in bilateral lower   lobes which could be due to atelectasis versus an infectious etiology in   the correct clinical setting.        --- End of Report ---      < end of copied text >              [facility]    Patient is a 67y old  Male who presents with a chief complaint of hyperosmolar hyperglycemic state (10 Feb 2023 02:52)    HPI:  67 year old male PMH CAD s/p CABG, DM, HLD coming in with jaundice and generalized body aches. Patient is lethargic and unable to provide much history. As per ED provider, " his symptoms started about 5 days ago initially with flu-like symptoms and vomiting. went to PMD and was given a nausea medication and felt improved but now feeling worse again. daughter states jaundice is also getting worse. has lost about 10-15lbs in the past week."     (10 Feb 2023 02:52)    PMH/PSH:  PAST MEDICAL & SURGICAL HISTORY:  CAD (coronary artery disease)      Diabetes mellitus      HLD (hyperlipidemia)        FH:  FAMILY HISTORY:  Denies fhx GI disorder.     MEDS:  MEDICATIONS  (STANDING):  chlorhexidine 2% Cloths 1 Application(s) Topical <User Schedule>  lactated ringers 1000 milliLiter(s) (150 mL/Hr) IV Continuous <Continuous>  potassium chloride  10 mEq/100 mL IVPB 10 milliEquivalent(s) IV Intermittent every 1 hour    MEDICATIONS  (PRN):    Allergies    No Known Allergies    Intolerances            CONSTITUTIONAL:  No weight loss, fever, chills, weakness or fatigue.  HEENT:  Eyes:  No visual loss, blurred vision, double vision or yellow sclerae. Ears, Nose, Throat:  No hearing loss, sneezing, congestion, runny nose or sore throat.  SKIN:  No rash or itching.  CARDIOVASCULAR:  No chest pain, chest pressure or chest discomfort. No palpitations or edema.  RESPIRATORY:  No shortness of breath, cough or sputum.  GASTROINTESTINAL:  SEE HPI  GENITOURINARY:  No dysuria, hematuria, urinary frequency  NEUROLOGICAL:  No headache, dizziness, syncope, paralysis, ataxia, numbness or tingling in the extremities. No change in bowel or bladder control.  MUSCULOSKELETAL:  No muscle, back pain, joint pain or stiffness.        ______________________________________________________________________  PHYSICAL EXAM:  T(C): 35.7 (02-10-23 @ 06:18), Max: 37.1 (02-09-23 @ 22:42)  HR: 77 (02-10-23 @ 10:00)  BP: 126/70 (02-10-23 @ 10:00)  RR: 15 (02-10-23 @ 10:00)  SpO2: 97% (02-10-23 @ 10:00)  Wt(kg): --    02-09  -  02-10  --------------------------------------------------------  IN:    IV PiggyBack: 100 mL    sodium chloride 0.45%: 150 mL  Total IN: 250 mL    OUT:    Indwelling Catheter - Urethral (mL): 950 mL  Total OUT: 950 mL    Total NET: -700 mL      02-10  -  02-10  --------------------------------------------------------  IN:    IV PiggyBack: 200 mL    Lactated Ringers w/ Additives: 150 mL    Oral Fluid: 120 mL    sodium chloride 0.45%: 300 mL  Total IN: 770 mL    OUT:    Indwelling Catheter - Urethral (mL): 650 mL  Total OUT: 650 mL    Total NET: 120 mL          GEN: NAD, normocephalic.  HEENT: sclera icteric   CVS: S1S2+  CHEST: clear to auscultation  ABD: soft , nontender, nondistended, bowel sounds present  EXTR: no cyanosis, no clubbing, no edema  NEURO: Awake and alert; oriented x2  SKIN:  warm;  generalized  icteric    ______________________________________________________________________  LABS:                        15.5   8.87  )-----------( 55       ( 10 Feb 2023 00:23 )             45.6     02-10    155<H>  |  119<H>  |  98<H>  ----------------------------<  580<HH>  2.5<LL>   |  25  |  2.42<H>    Ca    7.3<L>      10 Feb 2023 06:30  Phos  5.1     02-10  Mg     1.8     02-10    TPro  5.1<L>  /  Alb  1.8<L>  /  TBili  10.6<H>  /  DBili  x   /  AST  27  /  ALT  68<H>  /  AlkPhos  145<H>  02-10    LIVER FUNCTIONS - ( 10 Feb 2023 02:45 )  Alb: 1.8 g/dL / Pro: 5.1 g/dL / ALK PHOS: 145 U/L / ALT: 68 U/L DA / AST: 27 U/L / GGT: x           PT/INR - ( 10 Feb 2023 00:23 )   PT: 13.2 sec;   INR: 1.11 ratio         PTT - ( 10 Feb 2023 00:23 )  PTT:34.5 sec  ____________________________________________    IMAGING:    ______________________________________________________________________  < from: CT Abdomen and Pelvis No Cont (02.10.23 @ 05:54) >  ACC: [id number] EXAM:  CT ABDOMEN AND PELVIS   ORDERED BY: DOMINGA PLASENCIA     PROCEDURE DATE:  02/10/2023          INTERPRETATION:  CLINICAL INFORMATION: Jaundice and vomiting.    COMPARISON: None.    CONTRAST/COMPLICATIONS:  IV Contrast: NONE  Evaluation of the visceral organs is limited without   intravenous contrast  Oral Contrast: NONE  Complications: None reported at time of study completion    PROCEDURE:  CT of the Abdomen and Pelvis was performed.  Sagittal and coronal reformats were performed.    FINDINGS:  LOWER CHEST: Dependent airspace opacities in bilateral lower lobes.   Branching "tree in bud" opacities in the right middle lobe. Coronary   artery stents.    LIVER: Within normal limits.  BILE DUCTS: Normal caliber.  GALLBLADDER: Within normal limits.  SPLEEN: Within normal limits.  PANCREAS: 3 mm cystic lesion in the body of the pancreas.  ADRENALS: Within normal limits.  KIDNEYS/URETERS: Within normal limits.    BLADDER: Lion catheter balloon within the urinary bladder.   Intravesicular gas secondary to instrumentation.  REPRODUCTIVE ORGANS: Enlarged prostate to 5.6 cm.    BOWEL: Small second and third portion duodenal diverticula. No bowel   obstruction or inflammation. Appendix is normal.  PERITONEUM: No ascites.  VESSELS: Within normal limits.  RETROPERITONEUM/LYMPH NODES: No lymphadenopathy.  ABDOMINAL WALL: Within normal limits.  BONES: Mild degenerative changes of the spine. Bilateral L5 pars   interarticularis defects without spondylolisthesis.    IMPRESSION:  1. No bowel obstruction or inflammation.  2. Tiny cystic lesion in the body of the pancreas. A follow-up   contrast-enhanced MR pancreas protocol is recommended in 2 years.  3. Branching "tree in bud" opacities in the right middle lobe likely   infectious in etiology. Dependent airspace opacities in bilateral lower   lobes which could be due to atelectasis versus an infectious etiology in   the correct clinical setting.        --- End of Report ---      < end of copied text >

## 2023-02-10 NOTE — H&P ADULT - NSICDXPASTMEDICALHX_GEN_ALL_CORE_FT
PAST MEDICAL HISTORY:  CAD (coronary artery disease)     Diabetes mellitus     HLD (hyperlipidemia)

## 2023-02-10 NOTE — PATIENT PROFILE ADULT - FALL HARM RISK - HARM RISK INTERVENTIONS

## 2023-02-10 NOTE — ED ADULT NURSE REASSESSMENT NOTE - NS ED NURSE REASSESS COMMENT FT1
Pt AAOx4, VSS on 2L NC , NAD noted at this time. Attempted to call report to ICU, unable to give report to receiving nurse at this time.

## 2023-02-10 NOTE — ED PROVIDER NOTE - OBJECTIVE STATEMENT
67 year old male PMH CAD s/p CABG, DM, HLD coming in with jaundice, vomiting, generalized weakness. started about 5 days ago initially with flu-like symptoms and vomiting. went to PMD and was given a nausea medication and felt improved but now feeling worse again. daughter states jaundice is also getting worse. has lost about 10-15lbs in the past week.

## 2023-02-10 NOTE — CONSULT NOTE ADULT - NS ATTEND AMEND GEN_ALL_CORE FT
- Painless jaundice.  - Elevated LFTs.    Patient seen and examined. Reports no complaints other than fatigue. Abd soft. Significant jaundice, elevated LFTs now downtrending but of unclear etiology. CT unrevealing. Recommend MRCP to evaluate biliary tree rule out biliary obstruction/mass/stone. Trend LFTs. No signs of cholangitis. VSS. No fever. HHS mgmt per ICU team.     Total time spent to complete patient's bedside assessment, physical examination, review medical chart including labs & imaging, discuss medical plan of care with housestaff was more than 50 minutes

## 2023-02-10 NOTE — H&P ADULT - HISTORY OF PRESENT ILLNESS
67 year old male PMH CAD s/p CABG, DM, HLD coming in with jaundice and generalized body aches. Patient is lethargic and unable to provide much history. As per ED provider, " his symptoms started about 5 days ago initially with flu-like symptoms and vomiting. went to PMD and was given a nausea medication and felt improved but now feeling worse again. daughter states jaundice is also getting worse. has lost about 10-15lbs in the past week."

## 2023-02-10 NOTE — ED ADULT NURSE NOTE - NSIMPLEMENTINTERV_GEN_ALL_ED
Implemented All Fall Risk Interventions:  [place] to call system. Call bell, personal items and telephone within reach. Instruct patient to call for assistance. Room bathroom lighting operational. Non-slip footwear when patient is off stretcher. Physically safe environment: no spills, clutter or unnecessary equipment. Stretcher in lowest position, wheels locked, appropriate side rails in place. Provide visual cue, wrist band, yellow gown, etc. Monitor gait and stability. Monitor for mental status changes and reorient to person, place, and time. Review medications for side effects contributing to fall risk. Reinforce activity limits and safety measures with patient and family.

## 2023-02-10 NOTE — H&P ADULT - ATTENDING COMMENTS
Patient seen and examined with ICU resident in the ED upon consultation request at 2AM today. Data reviewed. Case and plan of care discussed with resident.   This is 67M with PMH as listed above including Type 2 DM presented to the ED with about a week of flu like symptoms associated with generalized weakness/fatigue, vomiting and jaundice. In the ED he was found to have elevated blood sugar of 1052 and serum K 2.6 and he was thought to be in HHS for which he was started on IV fluid NS boluses and potassium repleted. On other blood work he had severe KYLE, elevated LFTYs with high direct bilirubin and plan is to obtain CT abdomen/pelvis for further evaluation..   On ICU evaluation patient severely dehydrated and more fluid boluses started with aggressive repletion of potassium. Insulin on hold until serum K >3.5.  Accepted to ICU for further management and closer monitoring.     VS stable. Elderly man resting comfortably in bed, awake, alert, moves all extremities, HEENT- dry tongue and oral mucous membranes, heart- normal heart sounds, Lungs- clear, abdomen- full, soft, no palpable organomegaly, +BS, Ext - no edema.    Labs/Imaging reviewed: Glucose 1052, K 2.6,  Bun/Cr 131/3.84, elevated Lipase,  lactate 2.2,, PLT 55K (unknown baseline), D bili 12.7, T bili 14.5, ALT 90/. CT Abdomen/pelvis result pending.    POCUS showed small and collapsible IVC, good LV contractility.     Assessment  Hyperglycemic hyperosmolar state (HHS)  Severe hypokalemia  Severe KYLE, prerenal  Jaundice with elevated LFTs and high Direct bilirubin R/o obstructive jaundice.   Severe Thrombocytopenia of unclear etiology  H/o CAD/CABG  H/o Type 2 DM     Plan  Accepted to ICU for further management and closer monitoring.  Continue IV Fluid LR boluses to at least 6liters and thereafter maintenance at 150ml/hr.  Aggressive repletion of potassium to target serum K >4.  To start insulin drip when serum K >3.5-4  Monitor FS q1hr.  Monitor electrolytes including Mg and Phos and replete as needed. Monitor renal function and urine output.   CT abdomen/pelvis in view of suspected obstructive jaundice  Check peripheral smear and check Retic count/LDH in view of severe thrombocytopenia.   NPO for now.  DVT prophylaxis with Lovenox.    Condition: Critical, Prognosis: Guarded.

## 2023-02-10 NOTE — ED PROVIDER NOTE - CLINICAL SUMMARY MEDICAL DECISION MAKING FREE TEXT BOX
67 year old male with weakness, jaundice. PE as above.  labs, fluids, cxr, ecg, ct abdo/pelvis, admission

## 2023-02-10 NOTE — H&P ADULT - NSHPPHYSICALEXAM_GEN_ALL_CORE
LOS: 1d    VITALS:   T(C): 36.6 (02-10-23 @ 01:30), Max: 37.1 (02-09-23 @ 22:42)  HR: 94 (02-10-23 @ 02:03) (89 - 118)  BP: 128/83 (02-10-23 @ 02:03) (91/62 - 128/83)  RR: 12 (02-10-23 @ 02:03) (12 - 20)  SpO2: 96% (02-10-23 @ 02:03) (95% - 96%)    GENERAL: Jaundiced   HEAD:  Atraumatic, Normocephalic  EYES: EOMI, PERRLA, conjunctiva and sclera clear  ENT: Dry mucous membranes  NECK: Supple, No JVD  CHEST/LUNG: Clear to auscultation bilaterally; No rales, rhonchi, wheezing, or rubs. Unlabored respirations  HEART: Regular rate and rhythm; No murmurs, rubs, or gallops  ABDOMEN: BSx4; Soft, nontender, nondistended  EXTREMITIES:  2+ Peripheral Pulses, brisk capillary refill. No clubbing, cyanosis, or edema  NERVOUS SYSTEM:  A&Ox3, no focal deficits   SKIN: No rashes or lesions

## 2023-02-10 NOTE — ED ADULT NURSE NOTE - OBJECTIVE STATEMENT
pt awake with daughter at bedside,as per daughter feeling weak for few days with jaundice x 1 week,,with nausea ,vomiting.

## 2023-02-10 NOTE — ED PROVIDER NOTE - PRO INTERPRETER NEED 2
Mandarin Composite Graft Text: The defect edges were debeveled with a #15 scalpel blade.  Given the location of the defect, shape of the defect, the proximity to free margins and the fact the defect was full thickness a composite graft was deemed most appropriate.  The defect was outline and then transferred to the donor site.  A full thickness graft was then excised from the donor site. The graft was then placed in the primary defect, oriented appropriately and then sutured into place.  The secondary defect was then repaired using a primary closure.

## 2023-02-10 NOTE — PATIENT PROFILE ADULT - NSPRONUTRITIONRISK_GEN_A_NUR
No indicators present
no chest pain/no palpitations/no dyspnea on exertion/no orthopnea/no paroxysmal nocturnal dyspnea/no peripheral edema/no claudication

## 2023-02-10 NOTE — ED PROVIDER NOTE - PROGRESS NOTE DETAILS
labs with many abnormalities, consistent with HONK. hypokalemia, no insulin at this time. given multiple fluid boluses given likely severe dehydration. ICU consulted.

## 2023-02-10 NOTE — PROGRESS NOTE ADULT - SUBJECTIVE AND OBJECTIVE BOX
Patient is a 67y old  Male who presents with a chief complaint of hyperosmolar hyperglycemic state (10 Feb 2023 02:52)      INTERVAL HPI/OVERNIGHT EVENTS:   No overnight events   Afebrile, hemodynamically stable     Subjective: Patient seen and examined at bedside. NAD. Mandarin  ID: [id number]. Patient states he started having vomiting and loss of appetite 1 week ago, he also lost 10-15 pounds and developed jaundice.    ICU Vital Signs Last 24 Hrs  T(C): 35.7 (10 Feb 2023 06:18), Max: 37.1 (2023 22:42)  T(F): 96.3 (10 Feb 2023 06:18), Max: 98.8 (2023 22:42)  HR: 77 (10 Feb 2023 10:00) (76 - 118)  BP: 126/70 (10 Feb 2023 10:00) (91/62 - 134/73)  BP(mean): 83 (10 Feb 2023 10:00) (83 - 88)  ABP: --  ABP(mean): --  RR: 15 (10 Feb 2023 10:00) (12 - 20)  SpO2: 97% (10 Feb 2023 10:00) (95% - 100%)    O2 Parameters below as of 10 Feb 2023 10:00  Patient On (Oxygen Delivery Method): room air          I&O's Summary    2023 07:01  -  10 Feb 2023 07:00  --------------------------------------------------------  IN: 250 mL / OUT: 950 mL / NET: -700 mL    10 Feb 2023 07:01  -  10 Feb 2023 11:28  --------------------------------------------------------  IN: 770 mL / OUT: 650 mL / NET: 120 mL          PHYSICAL EXAM:  GENERAL: No acute distress   HEAD:  Atraumatic, Normocephalic  EYES: scleral icterus   ENMT: No tonsillar erythema, exudates, or enlargement; Moist mucous membranes  NECK: Supple, No JVD, Normal thyroid  HEART: Regular rate and rhythm; No murmurs, rubs, or gallops  RESPIRATORY: CTA B/L, No W/R/R  ABDOMEN: Soft, Nontender, Nondistended, no hepatosplenomegaly; Bowel sounds present  NEUROLOGY: A&Ox3, nonfocal, moving all extremities  EXTREMITIES:  2+ Peripheral Pulses, No clubbing, cyanosis, or edema  SKIN: jaundiced, warm, dry, no rash or abnormal lesions    LABS:                        15.5   8.87  )-----------( 55       ( 10 Feb 2023 00:23 )             45.6     02-10    155<H>  |  119<H>  |  98<H>  ----------------------------<  580<HH>  2.5<LL>   |  25  |  2.42<H>    Ca    7.3<L>      10 Feb 2023 06:30  Phos  5.1     02-10  Mg     1.8     02-10    TPro  5.1<L>  /  Alb  1.8<L>  /  TBili  10.6<H>  /  DBili  x   /  AST  27  /  ALT  68<H>  /  AlkPhos  145<H>  02-10    PT/INR - ( 10 Feb 2023 00:23 )   PT: 13.2 sec;   INR: 1.11 ratio         PTT - ( 10 Feb 2023 00:23 )  PTT:34.5 sec  Urinalysis Basic - ( 10 Feb 2023 02:35 )    Color: Yellow / Appearance: Clear / S.010 / pH: x  Gluc: x / Ketone: Trace  / Bili: Small / Urobili: 1   Blood: x / Protein: 15 / Nitrite: Negative   Leuk Esterase: Negative / RBC: 2-5 /HPF / WBC 0-2 /HPF   Sq Epi: x / Non Sq Epi: Occasional /HPF / Bacteria: Trace /HPF      CAPILLARY BLOOD GLUCOSE      POCT Blood Glucose.: 481 mg/dL (10 Feb 2023 10:28)  POCT Blood Glucose.: 473 mg/dL (10 Feb 2023 09:34)  POCT Blood Glucose.: 467 mg/dL (10 Feb 2023 08:47)  POCT Blood Glucose.: 495 mg/dL (10 Feb 2023 07:39)  POCT Blood Glucose.: 548 mg/dL (10 Feb 2023 06:17)  POCT Blood Glucose.: >600 mg/dL (10 Feb 2023 01:18)  POCT Blood Glucose.: >600 mg/dL (2023 23:51)        Consultant(s) Notes Reviewed:  [x ] YES  [ ] NO    MEDICATIONS  (STANDING):  chlorhexidine 2% Cloths 1 Application(s) Topical <User Schedule>  lactated ringers 1000 milliLiter(s) (150 mL/Hr) IV Continuous <Continuous>    MEDICATIONS  (PRN):      Care Discussed with Consultants/Other Providers [ x] YES  [ ] NO    RADIOLOGY & ADDITIONAL TESTS:

## 2023-02-10 NOTE — H&P ADULT - ASSESSMENT
Assessment:  - hyperosmolar hyperglycemic state   - Thrombocytopenia   - KYLE   - hypokalemia   - Hyperbilirubinemia  - DM  - HLD  - CAD      Plan:  Neuro:  - avoid any sedating meds      CV:  # CAD  - Resume home meds once med rec verified     #HLD  - Resume home meds once med rec verified     Pulm:  No active issues     ID:  No active issues     Nephro:  #KYLE  - BUN/Cr: 131/3.84  - Likely prerenal 2/2 dehydration   - monitor urine output   - UA negative   - F/u CT abd/plv  - Continue IV hydration   - Monitor BMP daily     # hypokalemia   - K 2.6, likely 2/2 poor po intake   - Being replaced with 40 mEqx1 PO and 97oNoo5 IVP  - Monitor K level BMP Q4  - Start insulin drip when K>3.5    # Hypernatremia   - Corrected Na 155  - Likely 2/2 dehydration   - getting a total of 6L of IVF Boluses  - Monitor bmp      GI:  # hyperbilirubinemia  - T bili: 14.5, Direct bili 12.7  - Follow  CT abdomen   - Follow RUQ US     Heme:  # Thrombocytopenia   - Plt 55  - Normal morphology   - No schistocytes  - Monitor CBC daily     Endo:  # HHS     FEN:  - minimize IVF to avoid worsening breathing     Prophy:  - resume DOAC  - C/W Lovenox/ Heparin     Dispo:  - monitor in the ICU until off bipap   Assessment:  - hyperosmolar hyperglycemic state   - Thrombocytopenia   - KYLE   - hypokalemia   - Hyperbilirubinemia  - DM  - HLD  - CAD      Plan:  Neuro:  - avoid any sedating meds      CV:  # CAD  - Resume home meds once med rec verified     #HLD  - Resume home meds once med rec verified     Pulm:  No active issues     ID:  No active issues     Nephro:  #KYLE  - BUN/Cr: 131/3.84  - Likely prerenal 2/2 dehydration   - monitor urine output   - UA negative   - F/u CT abd/plv  - Continue IV hydration   - Monitor BMP daily     # hypokalemia   - K 2.6, likely 2/2 poor po intake   - Being replaced with 40 mEqx1 PO and 44aYqr5 IVP  - Monitor K level BMP Q4  - Start insulin drip when K>3.5    # Hypernatremia   - Corrected Na 155  - Likely 2/2 dehydration   - getting a total of 6L of IVF Boluses  - Monitor bmp      GI:  # hyperbilirubinemia  - T bili: 14.5, Direct bili 12.7  - Follow  CT abdomen   - Follow RUQ US     Heme:  # Thrombocytopenia   - Plt 55  - Normal morphology   - No schistocytes  - Monitor CBC daily     Endo:  # HHS   - BG 1054  - K 2.6  - s/p 3L IVF boluses, 3 more to go   - s/p KCl 40 mEqx1 PO and 35dKcx6 IVP  - Start insulin drip once K>3.5  - Finger stick Q1  - BMP Q4      Prophy:  - SCD boots   - PPI avoided 2/2 thrombocytopenia     Dispo:  - monitor in the ICU

## 2023-02-10 NOTE — PROGRESS NOTE ADULT - ASSESSMENT
67 year old male PMH CAD s/p CABG, DM, HLD coming in with jaundice and generalized body aches  Admitted to ICU for HHS.     Assessment:  - hyperosmolar hyperglycemic state   - Thrombocytopenia   - KYLE   - hypokalemia   - Hyperbilirubinemia  - DM  - HLD  - CAD      Plan:  Neuro:  - avoid any sedating meds      CV:  # CAD  - Resume home meds once med rec verified     #HLD  - Resume home meds once med rec verified     Pulm:  No active issues     ID:  No active issues     Nephro:  #KYLE  - BUN/Cr: 131/3.84  - Likely prerenal 2/2 dehydration   - monitor urine output   - UA negative   - F/u CT abd/plv  - Continue IV hydration   - Monitor BMP daily     # hypokalemia   - K 2.6, likely 2/2 GI loss and poor oral intake   - Being replaced with 40 mEqx1 PO and 41gRad7 IVP  - Monitor K level BMP Q4  - Start insulin drip or ISS q1 when K>3.5  - Aldosterone and renin       # Hypernatremia   - Corrected Na 155  - Likely 2/2 dehydration   - getting a total of 6L of IVF Boluses  - Monitor bmp    - on LR     GI:  # hyperbilirubinemia  - T bili: 14.5, Direct bili 12.7  - Follow  CT abdomen   - Follow RUQ US   - MRCP as per GI     Heme:  # Thrombocytopenia   - Plt 55  - Normal morphology   - No schistocytes  - Monitor CBC daily   - f/u iron studies     Endo:  # HHS   - BG 1054  - K 2.6  - s/p 3L IVF boluses, 3 more to go   - s/p KCl 40 mEqx1 PO and 04yTvi7 IVP  - Start insulin drip once K>3.5  - Finger stick Q1  - BMP Q4  - Start insulin drip or ISS q1 when K>3.5     Prophy:  - SCD boots   - PPI avoided 2/2 thrombocytopenia     Dispo:  - monitor in the ICU    67 year old male PMH CAD s/p CABG, DM, HLD coming in with jaundice and generalized body aches  Admitted to ICU for HHS.     Assessment:  - hyperosmolar hyperglycemic state   - Thrombocytopenia   - KYLE   - hypokalemia   - Hyperbilirubinemia  - DM  - HLD  - CAD      Plan:  Neuro:  - avoid any sedating meds      CV:  # CAD s/p stent placement 3 years ago   - plavix 75mg and xarelto 2.5 mg    #HTN  - Nifedipine 30mg    #HLD  - Pitavastatin     Pulm:  No active issues     ID:  No active issues     Nephro:  #KYLE  - BUN/Cr: 131/3.84  - Likely prerenal 2/2 dehydration   - monitor urine output   - UA negative   - F/u CT abd/plv  - Continue IV hydration   - Monitor BMP daily     # hypokalemia   - K 2.6, likely 2/2 GI loss and poor oral intake   - Being replaced with 40 mEqx1 PO and 54eUjl6 IVP  - Monitor K level BMP Q4  - Start insulin drip or ISS q1 when K>3.5  - Aldosterone and renin       # Hypernatremia   - Corrected Na 155  - Likely 2/2 dehydration   - getting a total of 6L of IVF Boluses  - Monitor bmp    - on LR     GI:  # hyperbilirubinemia  - T bili: 14.5, Direct bili 12.7  - Follow  CT abdomen   - Follow RUQ US   - MRCP as per GI     Heme:  # Thrombocytopenia   - Plt 55  - Normal morphology   - No schistocytes  - Monitor CBC daily   - f/u iron studies     Endo:  # HHS   - BG 1054  - K 2.6  - s/p 3L IVF boluses, 3 more to go   - s/p KCl 40 mEqx1 PO and 89xZou2 IVP  - Start insulin drip once K>3.5  - Finger stick Q1  - BMP Q4  - Start insulin drip or ISS q1 when K>3.5     #DM   - on janumet and jardiance  - insulin drip for HHS    Prophy:  - SCD boots, no AC for possible ERCP   - PPI avoided 2/2 thrombocytopenia     Dispo:  - monitor in the ICU

## 2023-02-10 NOTE — CONSULT NOTE ADULT - ASSESSMENT
This is a 67 year old male PMH CAD s/p CABG, DM, HLD coming in with jaundice and generalized body aches. GI consulted for painless jaundice. Patient is lethargic and unable to provide much history. As per daughter (Ligia), " his symptoms started about 5 days ago initially with flu-like symptoms and vomiting. Went to PMD and was given a nausea medication and felt improved but now feeling worse again. Daughter states jaundice is also getting worse. Has lost about 10-15lbs in the past week." As per daughter patient does not drink or smoke cigarettes Last travel to [place] 5+ years ago. Never had an EGD or a colonoscopy Patient takes Ginseng supplements. In short, patient presented with one week of lethargy and worsening painless jaundice. Admitted with [facility] into ICU on insulin gtt. CT A/P revealed 3 mm cystic lesion in the body of the pancreas. Labs significant for WBC 8 HH 15/46 PLT 55 INR 1 PT 12  CR/BUN 2.4/98 Tbili 10.6  ASt 27 ALT 68. Patient seen and examined at bedside. Patient denies any difficulty swallowing or reflux. No N&V or abdominal pain. No blood or dark tarry stools. Normal caliber Denies any Tylenol use. Denies any hepatitis exposure. No tattoos     #Painless Jaundice  #Thrombocytopenia  #Transaminitis  #lethargy    P/W painless jaundice. CT unremarkable. Noticed down trend of LFTs. Tbili 14.5-->10.6. Likely a passing stone Vs autoimmune Vs stricture Vs malignancy Vs PBC Vs other  MRCP w/o contrast to assess biliary ideally contrast but KYLE)  Avoid NSAID  Hep A,B,C panel. Antismooth muscle, anti mitochondrial AB, LDH, IGg subset, CEA, , AFP  Trend hepatic panel              This is a 67 year old male PMH CAD s/p CABG, DM, HLD coming in with jaundice and generalized body aches. GI consulted for painless jaundice. Patient is lethargic and unable to provide much history. As per daughter (Ligia), " his symptoms started about 5 days ago initially with flu-like symptoms and vomiting. Went to PMD and was given a nausea medication and felt improved but now feeling worse again. Daughter states jaundice is also getting worse. Has lost about 10-15lbs in the past week." As per daughter patient does not drink or smoke cigarettes Last travel to [place] 5+ years ago. Never had an EGD or a colonoscopy Patient takes Ginseng supplements. In short, patient presented with one week of lethargy and worsening painless jaundice. Admitted with [facility] into ICU on insulin gtt. CT A/P revealed 3 mm cystic lesion in the body of the pancreas. Labs significant for WBC 8 HH 15/46 PLT 55 INR 1 PT 12  CR/BUN 2.4/98 Tbili 10.6  ASt 27 ALT 68. Patient seen and examined at bedside. Patient denies any difficulty swallowing or reflux. No N&V or abdominal pain. No blood or dark tarry stools. Normal caliber Denies any Tylenol use. Denies any hepatitis exposure. No tattoos     #Painless Jaundice  #Thrombocytopenia  #Transaminitis  #lethargy    P/W painless jaundice. CT unremarkable. Noticed down trend of LFTs. Tbili 14.5-->10.6. Ddx obstructing lesion/stone or primary liver etiology i.e. viral, etc.     MRCP w/o contrast to assess biliary ideally contrast but KYLE)  Avoid NSAID  Hep A,B,C panel. Antismooth muscle, anti mitochondrial AB, LDH, IGg subset, CEA, , AFP, ceruloplasmin, alpha1-antitrypsin, iron panel, MARA  Trend hepatic panel

## 2023-02-11 LAB
A1C WITH ESTIMATED AVERAGE GLUCOSE RESULT: 9.1 % — HIGH (ref 4–5.6)
ALBUMIN SERPL ELPH-MCNC: 1.8 G/DL — LOW (ref 3.5–5)
ALP SERPL-CCNC: 147 U/L — HIGH (ref 40–120)
ALT FLD-CCNC: 92 U/L DA — HIGH (ref 10–60)
ANION GAP SERPL CALC-SCNC: 4 MMOL/L — LOW (ref 5–17)
ANION GAP SERPL CALC-SCNC: 5 MMOL/L — SIGNIFICANT CHANGE UP (ref 5–17)
ANION GAP SERPL CALC-SCNC: 6 MMOL/L — SIGNIFICANT CHANGE UP (ref 5–17)
AST SERPL-CCNC: 118 U/L — HIGH (ref 10–40)
BASOPHILS # BLD AUTO: 0 K/UL — SIGNIFICANT CHANGE UP (ref 0–0.2)
BASOPHILS NFR BLD AUTO: 0 % — SIGNIFICANT CHANGE UP (ref 0–2)
BILIRUB DIRECT SERPL-MCNC: 9 MG/DL — HIGH (ref 0–0.3)
BILIRUB INDIRECT FLD-MCNC: 1.2 MG/DL — HIGH (ref 0.2–1)
BILIRUB SERPL-MCNC: 10.2 MG/DL — HIGH (ref 0.2–1.2)
BILIRUB SERPL-MCNC: 10.2 MG/DL — HIGH (ref 0.2–1.2)
BUN SERPL-MCNC: 57 MG/DL — HIGH (ref 7–18)
BUN SERPL-MCNC: 68 MG/DL — HIGH (ref 7–18)
BUN SERPL-MCNC: 70 MG/DL — HIGH (ref 7–18)
CALCIUM SERPL-MCNC: 6.9 MG/DL — LOW (ref 8.4–10.5)
CALCIUM SERPL-MCNC: 7 MG/DL — LOW (ref 8.4–10.5)
CALCIUM SERPL-MCNC: 7.2 MG/DL — LOW (ref 8.4–10.5)
CHLORIDE SERPL-SCNC: 120 MMOL/L — HIGH (ref 96–108)
CHLORIDE SERPL-SCNC: 125 MMOL/L — HIGH (ref 96–108)
CHLORIDE SERPL-SCNC: 127 MMOL/L — HIGH (ref 96–108)
CO2 SERPL-SCNC: 28 MMOL/L — SIGNIFICANT CHANGE UP (ref 22–31)
CO2 SERPL-SCNC: 29 MMOL/L — SIGNIFICANT CHANGE UP (ref 22–31)
CO2 SERPL-SCNC: 30 MMOL/L — SIGNIFICANT CHANGE UP (ref 22–31)
CREAT SERPL-MCNC: 1.69 MG/DL — HIGH (ref 0.5–1.3)
CREAT SERPL-MCNC: 1.71 MG/DL — HIGH (ref 0.5–1.3)
CREAT SERPL-MCNC: 1.76 MG/DL — HIGH (ref 0.5–1.3)
CULTURE RESULTS: SIGNIFICANT CHANGE UP
DACRYOCYTES BLD QL SMEAR: SLIGHT — SIGNIFICANT CHANGE UP
EGFR: 42 ML/MIN/1.73M2 — LOW
EGFR: 43 ML/MIN/1.73M2 — LOW
EGFR: 44 ML/MIN/1.73M2 — LOW
EOSINOPHIL # BLD AUTO: 0 K/UL — SIGNIFICANT CHANGE UP (ref 0–0.5)
EOSINOPHIL NFR BLD AUTO: 0 % — SIGNIFICANT CHANGE UP (ref 0–6)
ESTIMATED AVERAGE GLUCOSE: 214 MG/DL — HIGH (ref 68–114)
GLUCOSE BLDC GLUCOMTR-MCNC: 257 MG/DL — HIGH (ref 70–99)
GLUCOSE BLDC GLUCOMTR-MCNC: 301 MG/DL — HIGH (ref 70–99)
GLUCOSE SERPL-MCNC: 248 MG/DL — HIGH (ref 70–99)
GLUCOSE SERPL-MCNC: 265 MG/DL — HIGH (ref 70–99)
GLUCOSE SERPL-MCNC: 343 MG/DL — HIGH (ref 70–99)
HCT VFR BLD CALC: 41.7 % — SIGNIFICANT CHANGE UP (ref 39–50)
HGB BLD-MCNC: 14 G/DL — SIGNIFICANT CHANGE UP (ref 13–17)
LACTATE SERPL-SCNC: 1 MMOL/L — SIGNIFICANT CHANGE UP (ref 0.7–2)
LDH SERPL L TO P-CCNC: 341 U/L — HIGH (ref 120–225)
LYMPHOCYTES # BLD AUTO: 1.41 K/UL — SIGNIFICANT CHANGE UP (ref 1–3.3)
LYMPHOCYTES # BLD AUTO: 16 % — SIGNIFICANT CHANGE UP (ref 13–44)
MAGNESIUM SERPL-MCNC: 1.7 MG/DL — SIGNIFICANT CHANGE UP (ref 1.6–2.6)
MANUAL SMEAR VERIFICATION: SIGNIFICANT CHANGE UP
MCHC RBC-ENTMCNC: 31.5 PG — SIGNIFICANT CHANGE UP (ref 27–34)
MCHC RBC-ENTMCNC: 33.6 GM/DL — SIGNIFICANT CHANGE UP (ref 32–36)
MCV RBC AUTO: 93.7 FL — SIGNIFICANT CHANGE UP (ref 80–100)
MONOCYTES # BLD AUTO: 0.62 K/UL — SIGNIFICANT CHANGE UP (ref 0–0.9)
MONOCYTES NFR BLD AUTO: 7 % — SIGNIFICANT CHANGE UP (ref 2–14)
NEUTROPHILS # BLD AUTO: 6.34 K/UL — SIGNIFICANT CHANGE UP (ref 1.8–7.4)
NEUTROPHILS NFR BLD AUTO: 72 % — SIGNIFICANT CHANGE UP (ref 43–77)
NRBC # BLD: 0 /100 — SIGNIFICANT CHANGE UP (ref 0–0)
PHOSPHATE SERPL-MCNC: 3.5 MG/DL — SIGNIFICANT CHANGE UP (ref 2.5–4.5)
PLAT MORPH BLD: NORMAL — SIGNIFICANT CHANGE UP
PLATELET # BLD AUTO: 66 K/UL — LOW (ref 150–400)
PLATELET COUNT - ESTIMATE: ABNORMAL
POIKILOCYTOSIS BLD QL AUTO: SLIGHT — SIGNIFICANT CHANGE UP
POTASSIUM SERPL-MCNC: 3.6 MMOL/L — SIGNIFICANT CHANGE UP (ref 3.5–5.3)
POTASSIUM SERPL-MCNC: 3.7 MMOL/L — SIGNIFICANT CHANGE UP (ref 3.5–5.3)
POTASSIUM SERPL-MCNC: 4.6 MMOL/L — SIGNIFICANT CHANGE UP (ref 3.5–5.3)
POTASSIUM SERPL-SCNC: 3.6 MMOL/L — SIGNIFICANT CHANGE UP (ref 3.5–5.3)
POTASSIUM SERPL-SCNC: 3.7 MMOL/L — SIGNIFICANT CHANGE UP (ref 3.5–5.3)
POTASSIUM SERPL-SCNC: 4.6 MMOL/L — SIGNIFICANT CHANGE UP (ref 3.5–5.3)
PROT SERPL-MCNC: 5.3 G/DL — LOW (ref 6–8.3)
RBC # BLD: 4.45 M/UL — SIGNIFICANT CHANGE UP (ref 4.2–5.8)
RBC # FLD: 12.6 % — SIGNIFICANT CHANGE UP (ref 10.3–14.5)
RBC BLD AUTO: ABNORMAL
SODIUM SERPL-SCNC: 154 MMOL/L — HIGH (ref 135–145)
SODIUM SERPL-SCNC: 159 MMOL/L — HIGH (ref 135–145)
SODIUM SERPL-SCNC: 161 MMOL/L — CRITICAL HIGH (ref 135–145)
SPECIMEN SOURCE: SIGNIFICANT CHANGE UP
VARIANT LYMPHS # BLD: 5 % — SIGNIFICANT CHANGE UP (ref 0–6)
WBC # BLD: 8.8 K/UL — SIGNIFICANT CHANGE UP (ref 3.8–10.5)
WBC # FLD AUTO: 8.8 K/UL — SIGNIFICANT CHANGE UP (ref 3.8–10.5)

## 2023-02-11 RX ORDER — INSULIN GLARGINE 100 [IU]/ML
10 INJECTION, SOLUTION SUBCUTANEOUS AT BEDTIME
Refills: 0 | Status: DISCONTINUED | OUTPATIENT
Start: 2023-02-11 | End: 2023-02-13

## 2023-02-11 RX ORDER — SODIUM CHLORIDE 9 MG/ML
1000 INJECTION, SOLUTION INTRAVENOUS
Refills: 0 | Status: DISCONTINUED | OUTPATIENT
Start: 2023-02-11 | End: 2023-02-11

## 2023-02-11 RX ORDER — INSULIN LISPRO 100/ML
VIAL (ML) SUBCUTANEOUS
Refills: 0 | Status: DISCONTINUED | OUTPATIENT
Start: 2023-02-11 | End: 2023-02-18

## 2023-02-11 RX ORDER — SODIUM CHLORIDE 9 MG/ML
1000 INJECTION, SOLUTION INTRAVENOUS
Refills: 0 | Status: COMPLETED | OUTPATIENT
Start: 2023-02-11 | End: 2023-02-12

## 2023-02-11 RX ORDER — ONDANSETRON 8 MG/1
4 TABLET, FILM COATED ORAL ONCE
Refills: 0 | Status: COMPLETED | OUTPATIENT
Start: 2023-02-11 | End: 2023-02-11

## 2023-02-11 RX ADMIN — Medication 4: at 02:07

## 2023-02-11 RX ADMIN — INSULIN GLARGINE 10 UNIT(S): 100 INJECTION, SOLUTION SUBCUTANEOUS at 22:00

## 2023-02-11 RX ADMIN — SODIUM CHLORIDE 110 MILLILITER(S): 9 INJECTION, SOLUTION INTRAVENOUS at 06:08

## 2023-02-11 RX ADMIN — Medication 4: at 10:18

## 2023-02-11 RX ADMIN — Medication 100 MILLIGRAM(S): at 11:33

## 2023-02-11 RX ADMIN — CEFTRIAXONE 100 MILLIGRAM(S): 500 INJECTION, POWDER, FOR SOLUTION INTRAMUSCULAR; INTRAVENOUS at 16:49

## 2023-02-11 RX ADMIN — SODIUM CHLORIDE 110 MILLILITER(S): 9 INJECTION, SOLUTION INTRAVENOUS at 14:13

## 2023-02-11 RX ADMIN — Medication 40 MILLIEQUIVALENT(S): at 02:08

## 2023-02-11 RX ADMIN — ONDANSETRON 4 MILLIGRAM(S): 8 TABLET, FILM COATED ORAL at 16:49

## 2023-02-11 RX ADMIN — SODIUM CHLORIDE 75 MILLILITER(S): 9 INJECTION, SOLUTION INTRAVENOUS at 05:42

## 2023-02-11 RX ADMIN — CHLORHEXIDINE GLUCONATE 1 APPLICATION(S): 213 SOLUTION TOPICAL at 05:44

## 2023-02-11 RX ADMIN — Medication 6: at 22:03

## 2023-02-11 RX ADMIN — Medication 8: at 18:45

## 2023-02-11 RX ADMIN — ATORVASTATIN CALCIUM 20 MILLIGRAM(S): 80 TABLET, FILM COATED ORAL at 22:01

## 2023-02-11 RX ADMIN — Medication 8: at 13:52

## 2023-02-11 RX ADMIN — Medication 30 MILLIGRAM(S): at 07:51

## 2023-02-11 RX ADMIN — Medication 2: at 05:42

## 2023-02-11 RX ADMIN — SODIUM CHLORIDE 80 MILLILITER(S): 9 INJECTION, SOLUTION INTRAVENOUS at 17:42

## 2023-02-11 NOTE — PROGRESS NOTE ADULT - ATTENDING COMMENTS
67 year old male PMH CAD s/p CABG, DM, HLD coming in with jaundice and generalized body aches. Admitted to ICU for HHS.     Assessment:  1. Hyperosmolar hyperglycemic state   2. Hypernatremia   3. Hypokalemia   4. Jaundice   5. Thrombocytopenia   6. Acute kidney injury   7. Hyperbilirubinemia   8. Diabetes Mellitus type 2   9. Pancreatic cyst   10. Coronary artery disease     Plan:  - Hyperglycemia improving  - Start basal/bolus insulin regimen   - supplement potassium   - Trend BMP   - GI consult for billiary abnormality   - May need MRCP once stable  - Advance diet as tolerated   - IV fluid hydration, changed to D5 for hypernatremia   - Avoid rapid correction of serum sodium   - Creatinine is downtrending with hydration  - Monitor urine output  - ?Thrombocytopenia, monitor for signs of bleeding  - Non chemical DVT prophylaxis  - Transfer out of ICU
67 year old male PMH CAD s/p CABG, DM, HLD coming in with jaundice and generalized body aches. Admitted to ICU for HHS.     Assessment:  1. Hyperosmolar hyperglycemic state   2. Hypernatremia   3. Hypokalemia   4. Jaundice   5. Thrombocytopenia   6. Acute kidney injury   7. Hyperbilirubinemia   8. Diabetes Mellitus type 2   9. Pancreatic cyst   10. Coronary artery disease     Plan:  - Hyperglycemia improving with hydration  - Not started on insulin infusion given hypokalemia  - supplement potassium   - Trend BMP q 4 hours for now  - GI consult for billiary abnormality   - May need MRCP once stable  - Clear liquid diet if tolerated  - IV fluid hydration  - Avoid rapid correction of serum sodium   - Creatinine is downtrending with hydration  - Monitor urine output  - ?Thrombocytopenia, monitor for signs of bleeding  - Non chemical DVT prophylaxis  - Cont. ICU care

## 2023-02-11 NOTE — CHART NOTE - NSCHARTNOTEFT_GEN_A_CORE
Jb is a 68 yo Male, from home, with PMH CAD s/p CABG, DM, HLD presenting with jaundice and lethargy. Patient is lethargic and unable to provide much history. As per ED provider, "his symptoms started about 5 days ago initially with flu-like symptoms and vomiting. went to PMD and was given a nausea medication and felt improved but now feeling worse again. Daughter states jaundice is also getting worse. Has lost about 10-15lbs in the past week."   Patient was noted to have blood glucose > 1000 with severe hypokalemia, KYLE, and elevated bilirubin levels. He was admitted to the ICU for HHS management. He recieved 6L IVF bolluses and potassium suplement of  40 meq x4 and placed on IVF with to increase potassium from 2.6 to more than 3.5 for insulin drip, follow up aldosterone and rennin levels. Patient's blood sugar levels were trended down therefore he was placed on an insulin sliding scale every hour instead of a drip, he recieved a total of 28 units and was transitions to clear liquid diet with basal lantus of 10 units. Patient was also found to have elevated bilirubin of 14.5, CT abdomena and pelvis was performed, no obstructiing lesions found there was a small 3mm pancreatic cyst and tree in bud opacities in right middle lobe. Empiric ceftriaxone was started. GI Dr. Stroud consulted, MRCP ordered. Follow up Hep a/b/c, anti-smooth muscle antibody anti-mitochondrial antibody LDH, IgG subset, CEA, , AFP, MARA, ceruloplasmin, alpha 1 antitrypsin, iron panel. Patient also had thrombocytopenia, no signs of bleeding, normal morphology with no schistocytes. D5w was started for hypernatremia. Lion was discontiued, urine culture was negative. Patient is stable for downgrade to medicine floors.     To follow up:   - BMP Q8  - Advance diet as tolerated    - Resume xarelto adn palvix as per GI   - MRCP   - f/u aldosterone and renin levels  - c/w D5W   - monitor NA levels   - montior Cr levels  - Monitor platelet levels  - f/u Hep a/b/c, anti-smooth muscle antibody anti-mitochondrial antibody LDH, IgG subset, CEA, , AFP, MARA, ceruloplasmin, alpha 1 antitrypsin, iron panel  - GI Dr. Stroud on board       Signed out to Dr Torres and NP at 4N Patient is a 66 yo Male, from home, with PMH CAD s/p CABG, DM, HLD presenting with jaundice and lethargy. Patient is lethargic and unable to provide much history. As per ED provider, "his symptoms started about 5 days ago initially with flu-like symptoms and vomiting. went to PMD and was given a nausea medication and felt improved but now feeling worse again. Daughter states jaundice is also getting worse. Has lost about 10-15lbs in the past week."   Patient was noted to have blood glucose > 1000 with severe hypokalemia, KYLE, and elevated bilirubin levels. He was admitted to the ICU for HHS management. He received 6L IVF boluses and potassium supplement of  40 meq x4 and placed on IVF with to increase potassium from 2.6 to more than 3.5 for insulin drip, follow up aldosterone and rennin levels. Patient's blood sugar levels were trended down therefore he was placed on an insulin sliding scale every hour instead of a drip, he received a total of 28 units and was transitions to clear liquid diet with basal Lantus of 10 units. Patient was also found to have elevated bilirubin of 14.5, CT abdomen and pelvis was performed, no obstructing lesions found there was a small 3mm pancreatic cyst and tree in bud opacities in right middle lobe. Empiric ceftriaxone was started. GI Dr. Stroud consulted, MRCP ordered. Follow up Hep a/b/c, anti-smooth muscle antibody anti-mitochondrial antibody LDH, IgG subset, CEA, , AFP, MARA, ceruloplasmin, alpha 1 antitrypsin, iron panel. Patient also had thrombocytopenia, no signs of bleeding, normal morphology with no schistocytes. D5w was started for hypernatremia. Lion was discontinued, urine culture was negative. Patient is stable for downgrade to medicine floors.     To follow up:   - BMP Q8  - Advance diet as tolerated    - Resume xarelto and palvix as per GI   - MRCP   - f/u aldosterone and renin levels  - c/w D5W   - monitor NA levels   - monitor Cr levels  - Monitor platelet levels  - f/u Hep a/b/c, anti-smooth muscle antibody anti-mitochondrial antibody LDH, IgG subset, CEA, , AFP, MARA, ceruloplasmin, alpha 1 antitrypsin, iron panel  - GI Dr. Stroud on board       Signed out to Dr Torres and NP at [address] [place] [address]

## 2023-02-11 NOTE — PROGRESS NOTE ADULT - ASSESSMENT
67 year old male PMH CAD s/p CABG, DM, HLD coming in with jaundice and generalized body aches  Admitted to ICU for HHS.     Assessment:  - hyperosmolar hyperglycemic state   - Thrombocytopenia   - KYLE   - hypokalemia   - Hyperbilirubinemia  - DM  - HLD  - CAD      Plan:  Neuro:  - avoid any sedating meds      CV:  # CAD s/p stent placement 3 years ago   - plavix 75mg and xarelto 2.5 mg    #HTN  - Nifedipine 30mg    #HLD  - Pitavastatin     Pulm:  No active issues     ID:  No active issues     Nephro:  #KYLE  - BUN/Cr: 131/3.84  - Likely prerenal 2/2 dehydration   - monitor urine output   - UA negative   - F/u CT abd/plv  - Continue IV hydration   - Monitor BMP daily     # hypokalemia   - K 2.6, likely 2/2 GI loss and poor oral intake   - Being replaced with 40 mEqx1 PO and 81wCjt6 IVP  - Monitor K level BMP Q4  - Start insulin drip or ISS q1 when K>3.5  - Aldosterone and renin       # Hypernatremia   - Corrected Na 155  - Likely 2/2 dehydration   - getting a total of 6L of IVF Boluses  - Monitor bmp    - on LR     GI:  # hyperbilirubinemia  - T bili: 14.5, Direct bili 12.7  - Follow  CT abdomen   - Follow RUQ US   - MRCP as per GI     Heme:  # Thrombocytopenia   - Plt 55  - Normal morphology   - No schistocytes  - Monitor CBC daily   - f/u iron studies     Endo:  # HHS   - BG 1054  - K 2.6  - s/p 3L IVF boluses, 3 more to go   - s/p KCl 40 mEqx1 PO and 05uQyj6 IVP  - Start insulin drip once K>3.5  - Finger stick Q1  - BMP Q4  - Start insulin drip or ISS q1 when K>3.5     #DM   - on janumet and jardiance  - insulin drip for HHS    Prophy:  - SCD boots, no AC for possible ERCP   - PPI avoided 2/2 thrombocytopenia     Dispo:  - monitor in the ICU    67 year old male PMH CAD s/p CABG, DM, HLD coming in with jaundice and generalized body aches  Admitted to ICU for HHS.     Assessment:  - hyperosmolar hyperglycemic state   - Thrombocytopenia   - KYLE   - hypokalemia   - Hyperbilirubinemia  - DM  - HLD  - CAD      Plan:  Neuro:  - avoid any sedating meds    CV:  # CAD s/p stent placement 3 years ago   - plavix 75mg and xarelto 2.5 mg  - Hold for possible ERCP     #HTN  - Nifedipine 30mg  - monitor BP     #HLD  - Pitavastatin     Pulm:  No active issues     ID:  #PNA  CT A/P: showed tree in bud opacities in right middle lobe   Empiric treatment with ceftriaxone     Nephro:  #KYLE  - BUN/Cr: 131/3.84  - Likely prerenal 2/2 dehydration   - monitor urine output   - UA negative  - CT abd/plv:  3mm cystic lesion in the body of the pancreas.  - Continue IV hydration   - Monitor BMP daily     # hypokalemia   - K 2.6, likely 2/2 GI loss and poor oral intake   - Being replaced with 40 mEqx1 PO and 09mYyh1 IVP  - Monitor K level BMP Q8  -  ISS q4 when K>3.5  - f/u Aldosterone and renin   - Resolved     # Hypernatremia   - Corrected Na 155  - Likely 2/2 dehydration   - getting a total of 6L of IVF Boluses  - Monitor bmp    - on D5    GI:  # hyperbilirubinemia  - T bili: 14.5, Direct bili 12.7  - CT abdomen shows no obstruction    - Follow RUQ US   - MRCP as per GI   - Dr. Stroud GI on board   - f/u hep a/b/c, anti-smooth muscle antibody anti-mitochondrial antibody LDH, IgG subset, CEA, , AFP, MARA, ceruloplasmin, alpha 1 antitrypsin, iron panel     Heme:  # Thrombocytopenia   - Plt 55> 66  - Normal morphology   - No schistocytes  - Monitor CBC daily   - f/u iron studies   - No signs of bleeding     Endo:  # HHS   - BG 1054  - K 2.6  - s/p 3L IVF boluses, 3 more to go   - s/p KCl 40 mEqx1 PO and 06gXwt4 IVP  - Start insulin drip once K>3.5  - Finger stick Q4  - BMP Q8  - ISS q4 when K>3.5   - Start lantus 10 units  - CLD     #DM   - on Janumet and Jardiance  - ISS Q4     Prophy:  - SCD boots, no AC for possible ERCP   - PPI avoided 2/2 thrombocytopenia     Dispo:  - Stable for downgrade to floors

## 2023-02-11 NOTE — CONSULT NOTE ADULT - SUBJECTIVE AND OBJECTIVE BOX
HPI:  67 year old male PMH CAD s/p CABG, DM, HLD coming in with jaundice and generalized body aches. Patient is lethargic and unable to provide much history. As per ED provider, " his symptoms started about 5 days ago initially with flu-like symptoms and vomiting. went to PMD and was given a nausea medication and felt improved but now feeling worse again. daughter states jaundice is also getting worse. has lost about 10-15lbs in the past week."     (10 Feb 2023 02:52)      PAST MEDICAL & SURGICAL HISTORY:  CAD (coronary artery disease)      Diabetes mellitus      HLD (hyperlipidemia)          No Known Allergies      Social Hx:    FAMILY HISTORY:      allopurinol 100 milliGRAM(s) Oral daily  atorvastatin 20 milliGRAM(s) Oral at bedtime  cefTRIAXone   IVPB      cefTRIAXone   IVPB 1000 milliGRAM(s) IV Intermittent every 24 hours  dextrose 5%. 1000 milliLiter(s) IV Continuous <Continuous>  insulin glargine Injectable (LANTUS) 10 Unit(s) SubCutaneous at bedtime  insulin lispro (ADMELOG) corrective regimen sliding scale   SubCutaneous every 4 hours  NIFEdipine XL 30 milliGRAM(s) Oral daily      MEDICATIONS  (PRN):      T(C): 36.4 (02-11-23 @ 15:13), Max: 36.7 (02-11-23 @ 07:00)  HR: 71 (02-11-23 @ 18:00) (64 - 77)  BP: 136/78 (02-11-23 @ 18:00) (124/76 - 152/73)  RR: 16 (02-11-23 @ 18:00) (9 - 21)  SpO2: 97% (02-11-23 @ 18:00) (93% - 99%)        REVIEW OF SYSTEMS:                           ALL ROS DONE [ X   ]    CONSTITUTIONAL:  LETHARGIC [   ], FEVER [   ], UNRESPONSIVE [   ]  CVS:  CP  [   ], SOB, [   ], PALPITATIONS [   ], DIZZYNESS [   ]  RS: COUGH [   ], SPUTUM [   ]  GI: ABDOMINAL PAIN [   ], NAUSEA [   ], VOMITINGS [   ], DIARRHEA [   ], CONSTIPATION [   ]  :  DYSURIA [   ], NOCTURIA [   ], INCREASED FREQUENCY [   ], DRIBLING [   ],  SKELETAL: PAINFUL JOINTS [   ], SWOLLEN JOINTS [   ], NECK ACHE [   ], LOW BACK ACHE [   ],  SKIN : ULCERS [   ], RASH [   ], ITCHING [   ]  CNS: HEAD ACHE [   ], DOUBLE VISION [   ], BLURRED VISION [   ], AMS / CONFUSION [   ], SEIZURES [   ], WEAKNESS [   ],TINGLING / NUMBNESS [   ]    PHYSICAL EXAMINATION:  GENERAL APPEARANCE: NO DISTRESS  HEENT:  NO PALLOR, NO  JVD,  NO   NODES, NECK SUPPLE  CVS: S1 +, S2 +,   RS: AEEB,  OCCASIONAL  RALES +,   NO RONCHI  ABD: SOFT, NT, NO, BS +  EXT: NO PE  SKIN: WARM,   SKELETAL:  ROM ACCEPTABLE  CNS:  AAO X    ,   DEFICITS    RADIOLOGY :      ASSESSMENT :       PLAN:  HPI:  67 year old male PMH CAD s/p CABG, DM, HLD coming in with jaundice and generalized body aches. Patient is lethargic and unable to provide much history. As per ED provider, " his symptoms started about 5 days ago initially with flu-like symptoms and vomiting. went to PMD and was given a nausea medication and felt improved but now feeling worse again. daughter states jaundice is also getting worse. has lost about 10-15lbs in the past week."     (10 Feb 2023 02:52)    -      HPI:  67 year old male PMH CAD s/p CABG, DM, HLD coming in with jaundice and generalized body aches. Patient is lethargic and unable to provide much history. As per ED provider, " his symptoms started about 5 days ago initially with flu-like symptoms and vomiting. went to PMD and was given a nausea medication and felt improved but now feeling worse again. daughter states jaundice is also getting worse. has lost about 10-15lbs in the past week."     (10 Feb 2023 02:52)      PAST MEDICAL & SURGICAL HISTORY:  CAD (coronary artery disease)      Diabetes mellitus      HLD (hyperlipidemia)          No Known Allergies      Social Hx:    FAMILY HISTORY:      allopurinol 100 milliGRAM(s) Oral daily  atorvastatin 20 milliGRAM(s) Oral at bedtime  cefTRIAXone   IVPB      cefTRIAXone   IVPB 1000 milliGRAM(s) IV Intermittent every 24 hours  dextrose 5%. 1000 milliLiter(s) IV Continuous <Continuous>  insulin glargine Injectable (LANTUS) 10 Unit(s) SubCutaneous at bedtime  insulin lispro (ADMELOG) corrective regimen sliding scale   SubCutaneous every 4 hours  NIFEdipine XL 30 milliGRAM(s) Oral daily      MEDICATIONS  (PRN):      T(C): 36.4 (02-11-23 @ 15:13), Max: 36.7 (02-11-23 @ 07:00)  HR: 71 (02-11-23 @ 18:00) (64 - 77)  BP: 136/78 (02-11-23 @ 18:00) (124/76 - 152/73)  RR: 16 (02-11-23 @ 18:00) (9 - 21)  SpO2: 97% (02-11-23 @ 18:00) (93% - 99%)        REVIEW OF SYSTEMS:                           ALL ROS DONE [ X   ]    CONSTITUTIONAL:  LETHARGIC [   ], FEVER [   ], UNRESPONSIVE [   ]  CVS:  CP  [   ], SOB, [   ], PALPITATIONS [   ], DIZZYNESS [   ]  RS: COUGH [   ], SPUTUM [   ]  GI: ABDOMINAL PAIN [   ], NAUSEA [   ], VOMITINGS [   ], DIARRHEA [   ], CONSTIPATION [   ]  :  DYSURIA [   ], NOCTURIA [   ], INCREASED FREQUENCY [   ], DRIBLING [   ],  SKELETAL: PAINFUL JOINTS [   ], SWOLLEN JOINTS [   ], NECK ACHE [   ], LOW BACK ACHE [   ],  SKIN : ULCERS [   ], RASH [   ], ITCHING [   ]  CNS: HEAD ACHE [   ], DOUBLE VISION [   ], BLURRED VISION [   ], AMS / CONFUSION [   ], SEIZURES [   ], WEAKNESS [   ],TINGLING / NUMBNESS [   ]    PHYSICAL EXAMINATION:  GENERAL APPEARANCE: NO DISTRESS  HEENT:  NO PALLOR, NO  JVD,  NO   NODES, NECK SUPPLE  CVS: S1 +, S2 +,   RS: AEEB,  OCCASIONAL  RALES +,   NO RONCHI  ABD: SOFT, NT, NO, BS +  EXT: NO PE  SKIN: WARM,   SKELETAL:  ROM ACCEPTABLE  CNS:  AAO X    ,   DEFICITS    RADIOLOGY :      ASSESSMENT :       PLAN:  HPI:  67 year old male PMH CAD s/p CABG, DM, HLD coming in with jaundice and generalized body aches. Patient is lethargic and unable to provide much history. As per ED provider, " his symptoms started about 5 days ago initially with flu-like symptoms and vomiting. went to PMD and was given a nausea medication and felt improved but now feeling worse again. daughter states jaundice is also getting worse. has lost about 10-15lbs in the past week."     (10 Feb 2023 02:52)      # HHS - RESOLVED  # DM  - ON LANTUS, SSI + FS  - F/U HBA1C    # SEPSIS S/T SUSPECTED PNEUMONIA  - PLACED ON ROCEPHIN, F/U BCX    # KYLE  # HYPERNATREMIA  - ON IVF  - STRICT IS AN OS  - MONITOR CR  - AVOID NEPHROTOXIC AGENTS  - NEPHROLOGY CONSULT    # HYPOKALEMIA  - REPLETING WITH SUPPLEMENT    # JAUNDICE, HYPERBILIRUBINEMIA   - NOTED CT A/P  - F/U RUQ US  - F/U MRCP  - F/U HEPATITIS PANEL, AUTOIMMUNE WORKUP, TUMOR MARKERS, IRON PANEL  - GI CONSULT IN PROGRESS    # CAD s/p CABG  # HTN  - ON PLAVIX  - ON NIFEDIPINE    # ? A.FIB  - ON XARELTO, HOLD    # HLD - ON STATIN, HOLD    # GI PPX     HPI:  67 year old male PMH CAD s/p CABG, DM, HLD coming in with jaundice and generalized body aches. Patient is lethargic and unable to provide much history. As per ED provider, " his symptoms started about 5 days ago initially with flu-like symptoms and vomiting. went to PMD and was given a nausea medication and felt improved but now feeling worse again. daughter states jaundice is also getting worse. has lost about 10-15lbs in the past week."     (10 Feb 2023 02:52)      PAST MEDICAL & SURGICAL HISTORY:  CAD (coronary artery disease)      Diabetes mellitus      HLD (hyperlipidemia)          No Known Allergies      Social Hx:    FAMILY HISTORY:      allopurinol 100 milliGRAM(s) Oral daily  atorvastatin 20 milliGRAM(s) Oral at bedtime  cefTRIAXone   IVPB      cefTRIAXone   IVPB 1000 milliGRAM(s) IV Intermittent every 24 hours  dextrose 5%. 1000 milliLiter(s) IV Continuous <Continuous>  insulin glargine Injectable (LANTUS) 10 Unit(s) SubCutaneous at bedtime  insulin lispro (ADMELOG) corrective regimen sliding scale   SubCutaneous every 4 hours  NIFEdipine XL 30 milliGRAM(s) Oral daily      MEDICATIONS  (PRN):      T(C): 36.4 (02-11-23 @ 15:13), Max: 36.7 (02-11-23 @ 07:00)  HR: 71 (02-11-23 @ 18:00) (64 - 77)  BP: 136/78 (02-11-23 @ 18:00) (124/76 - 152/73)  RR: 16 (02-11-23 @ 18:00) (9 - 21)  SpO2: 97% (02-11-23 @ 18:00) (93% - 99%)        REVIEW OF SYSTEMS:                           ALL ROS DONE [ X   ]    CONSTITUTIONAL:  LETHARGIC [   ], FEVER [   ], UNRESPONSIVE [   ]  CVS:  CP  [   ], SOB, [   ], PALPITATIONS [   ], DIZZYNESS [   ]  RS: COUGH [   ], SPUTUM [   ]  GI: ABDOMINAL PAIN [   ], NAUSEA [   ], VOMITINGS [   ], DIARRHEA [   ], CONSTIPATION [   ]  :  DYSURIA [   ], NOCTURIA [   ], INCREASED FREQUENCY [   ], DRIBLING [   ],  SKELETAL: PAINFUL JOINTS [   ], SWOLLEN JOINTS [   ], NECK ACHE [   ], LOW BACK ACHE [   ],  SKIN : ULCERS [   ], RASH [   ], ITCHING [   ]  CNS: HEAD ACHE [   ], DOUBLE VISION [   ], BLURRED VISION [   ], AMS / CONFUSION [   ], SEIZURES [   ], WEAKNESS [   ],TINGLING / NUMBNESS [   ]    PHYSICAL EXAMINATION:  GENERAL APPEARANCE: NO DISTRESS  HEENT:  NO PALLOR, NO  JVD,  NO   NODES, NECK SUPPLE  CVS: S1 +, S2 +,   RS: AEEB,  OCCASIONAL  RALES +,   NO RONCHI  ABD: SOFT, NT, NO, BS +  EXT: NO PE  SKIN: WARM,  JAUNDICE  SKELETAL:  ROM ACCEPTABLE  CNS:  AAO X 2-3    RADIOLOGY :    RADIOLOGY AND READINGS REVIEWED    ASSESSMENT :       PLAN:  HPI:  67 year old male PMH CAD s/p CABG, DM, HLD coming in with jaundice and generalized body aches. Patient is lethargic and unable to provide much history. As per ED provider, " his symptoms started about 5 days ago initially with flu-like symptoms and vomiting. went to PMD and was given a nausea medication and felt improved but now feeling worse again. daughter states jaundice is also getting worse. has lost about 10-15lbs in the past week."     (10 Feb 2023 02:52)    # CASE D/W PATIENT VIA [employer]  [facility] ID [id number]    # HHS - RESOLVED  # DM  - ON LANTUS, SSI + FS  - F/U HBA1C    # SEPSIS S/T SUSPECTED PNEUMONIA  - PLACED ON ROCEPHIN, F/U BCX    # KYLE  # HYPERNATREMIA  - ON IVF  - STRICT IS AN OS  - MONITOR CR  - AVOID NEPHROTOXIC AGENTS  - NEPHROLOGY CONSULT    # HYPOKALEMIA  - REPLETING WITH SUPPLEMENT    # JAUNDICE, HYPERBILIRUBINEMIA   - NOTED CT A/P  - F/U RUQ US  - F/U MRCP  - F/U HEPATITIS PANEL, AUTOIMMUNE WORKUP, TUMOR MARKERS, IRON PANEL  - GI CONSULT IN PROGRESS    # CAD s/p CABG  # HTN  - ON PLAVIX  - ON NIFEDIPINE    # ? A.FIB  - ON XARELTO, HOLD    # HLD - ON STATIN, HOLD    # GI PPX

## 2023-02-12 LAB
AFP-TM SERPL-MCNC: 2 NG/ML — SIGNIFICANT CHANGE UP
ALBUMIN SERPL ELPH-MCNC: 1.7 G/DL — LOW (ref 3.5–5)
ALP SERPL-CCNC: 144 U/L — HIGH (ref 40–120)
ALT FLD-CCNC: 120 U/L DA — HIGH (ref 10–60)
ANION GAP SERPL CALC-SCNC: 6 MMOL/L — SIGNIFICANT CHANGE UP (ref 5–17)
ANION GAP SERPL CALC-SCNC: 8 MMOL/L — SIGNIFICANT CHANGE UP (ref 5–17)
ANION GAP SERPL CALC-SCNC: 8 MMOL/L — SIGNIFICANT CHANGE UP (ref 5–17)
AST SERPL-CCNC: 105 U/L — HIGH (ref 10–40)
BASOPHILS # BLD AUTO: 0 K/UL — SIGNIFICANT CHANGE UP (ref 0–0.2)
BASOPHILS NFR BLD AUTO: 0 % — SIGNIFICANT CHANGE UP (ref 0–2)
BILIRUB DIRECT SERPL-MCNC: 8.6 MG/DL — HIGH (ref 0–0.3)
BILIRUB INDIRECT FLD-MCNC: 1.7 MG/DL — HIGH (ref 0.2–1)
BILIRUB SERPL-MCNC: 10.3 MG/DL — HIGH (ref 0.2–1.2)
BILIRUB SERPL-MCNC: 10.3 MG/DL — HIGH (ref 0.2–1.2)
BUN SERPL-MCNC: 38 MG/DL — HIGH (ref 7–18)
BUN SERPL-MCNC: 44 MG/DL — HIGH (ref 7–18)
BUN SERPL-MCNC: 54 MG/DL — HIGH (ref 7–18)
CALCIUM SERPL-MCNC: 6 MG/DL — CRITICAL LOW (ref 8.4–10.5)
CALCIUM SERPL-MCNC: 6.2 MG/DL — CRITICAL LOW (ref 8.4–10.5)
CALCIUM SERPL-MCNC: 6.3 MG/DL — CRITICAL LOW (ref 8.4–10.5)
CANCER AG19-9 SERPL-ACNC: 23 U/ML — SIGNIFICANT CHANGE UP
CEA SERPL-MCNC: 2 NG/ML — SIGNIFICANT CHANGE UP (ref 0–3.8)
CERULOPLASMIN SERPL-MCNC: 23 MG/DL — SIGNIFICANT CHANGE UP (ref 15–30)
CHLORIDE SERPL-SCNC: 112 MMOL/L — HIGH (ref 96–108)
CHLORIDE SERPL-SCNC: 116 MMOL/L — HIGH (ref 96–108)
CHLORIDE SERPL-SCNC: 118 MMOL/L — HIGH (ref 96–108)
CO2 SERPL-SCNC: 26 MMOL/L — SIGNIFICANT CHANGE UP (ref 22–31)
CO2 SERPL-SCNC: 27 MMOL/L — SIGNIFICANT CHANGE UP (ref 22–31)
CO2 SERPL-SCNC: 29 MMOL/L — SIGNIFICANT CHANGE UP (ref 22–31)
CREAT SERPL-MCNC: 1.32 MG/DL — HIGH (ref 0.5–1.3)
CREAT SERPL-MCNC: 1.36 MG/DL — HIGH (ref 0.5–1.3)
CREAT SERPL-MCNC: 1.49 MG/DL — HIGH (ref 0.5–1.3)
EGFR: 51 ML/MIN/1.73M2 — LOW
EGFR: 57 ML/MIN/1.73M2 — LOW
EGFR: 59 ML/MIN/1.73M2 — LOW
EOSINOPHIL # BLD AUTO: 0 K/UL — SIGNIFICANT CHANGE UP (ref 0–0.5)
EOSINOPHIL NFR BLD AUTO: 0 % — SIGNIFICANT CHANGE UP (ref 0–6)
GLUCOSE BLDC GLUCOMTR-MCNC: 249 MG/DL — HIGH (ref 70–99)
GLUCOSE BLDC GLUCOMTR-MCNC: 299 MG/DL — HIGH (ref 70–99)
GLUCOSE BLDC GLUCOMTR-MCNC: 340 MG/DL — HIGH (ref 70–99)
GLUCOSE BLDC GLUCOMTR-MCNC: 409 MG/DL — HIGH (ref 70–99)
GLUCOSE SERPL-MCNC: 304 MG/DL — HIGH (ref 70–99)
GLUCOSE SERPL-MCNC: 327 MG/DL — HIGH (ref 70–99)
GLUCOSE SERPL-MCNC: 341 MG/DL — HIGH (ref 70–99)
HAV IGM SER-ACNC: SIGNIFICANT CHANGE UP
HBV CORE IGM SER-ACNC: SIGNIFICANT CHANGE UP
HBV SURFACE AG SER-ACNC: SIGNIFICANT CHANGE UP
HCT VFR BLD CALC: 39.2 % — SIGNIFICANT CHANGE UP (ref 39–50)
HCV AB S/CO SERPL IA: 0.12 S/CO — SIGNIFICANT CHANGE UP (ref 0–0.99)
HCV AB SERPL-IMP: SIGNIFICANT CHANGE UP
HGB BLD-MCNC: 13.5 G/DL — SIGNIFICANT CHANGE UP (ref 13–17)
LIDOCAIN IGE QN: 346 U/L — SIGNIFICANT CHANGE UP (ref 73–393)
LYMPHOCYTES # BLD AUTO: 1.17 K/UL — SIGNIFICANT CHANGE UP (ref 1–3.3)
LYMPHOCYTES # BLD AUTO: 13 % — SIGNIFICANT CHANGE UP (ref 13–44)
MAGNESIUM SERPL-MCNC: 1 MG/DL — CRITICAL LOW (ref 1.6–2.6)
MAGNESIUM SERPL-MCNC: 2.2 MG/DL — SIGNIFICANT CHANGE UP (ref 1.6–2.6)
MANUAL SMEAR VERIFICATION: SIGNIFICANT CHANGE UP
MCHC RBC-ENTMCNC: 32.1 PG — SIGNIFICANT CHANGE UP (ref 27–34)
MCHC RBC-ENTMCNC: 34.4 GM/DL — SIGNIFICANT CHANGE UP (ref 32–36)
MCV RBC AUTO: 93.1 FL — SIGNIFICANT CHANGE UP (ref 80–100)
METAMYELOCYTES # FLD: 2 % — HIGH (ref 0–0)
MONOCYTES # BLD AUTO: 0.45 K/UL — SIGNIFICANT CHANGE UP (ref 0–0.9)
MONOCYTES NFR BLD AUTO: 5 % — SIGNIFICANT CHANGE UP (ref 2–14)
MRSA PCR RESULT.: SIGNIFICANT CHANGE UP
MYELOCYTES NFR BLD: 2 % — HIGH (ref 0–0)
NEUTROPHILS # BLD AUTO: 6.85 K/UL — SIGNIFICANT CHANGE UP (ref 1.8–7.4)
NEUTROPHILS NFR BLD AUTO: 75 % — SIGNIFICANT CHANGE UP (ref 43–77)
NEUTS BAND # BLD: 1 % — SIGNIFICANT CHANGE UP (ref 0–8)
NRBC # BLD: 0 /100 — SIGNIFICANT CHANGE UP (ref 0–0)
PHOSPHATE SERPL-MCNC: 1.9 MG/DL — LOW (ref 2.5–4.5)
PHOSPHATE SERPL-MCNC: 2.9 MG/DL — SIGNIFICANT CHANGE UP (ref 2.5–4.5)
PLAT MORPH BLD: NORMAL — SIGNIFICANT CHANGE UP
PLATELET # BLD AUTO: 70 K/UL — LOW (ref 150–400)
POTASSIUM SERPL-MCNC: 3.3 MMOL/L — LOW (ref 3.5–5.3)
POTASSIUM SERPL-MCNC: 4 MMOL/L — SIGNIFICANT CHANGE UP (ref 3.5–5.3)
POTASSIUM SERPL-MCNC: 4.4 MMOL/L — SIGNIFICANT CHANGE UP (ref 3.5–5.3)
POTASSIUM SERPL-SCNC: 3.3 MMOL/L — LOW (ref 3.5–5.3)
POTASSIUM SERPL-SCNC: 4 MMOL/L — SIGNIFICANT CHANGE UP (ref 3.5–5.3)
POTASSIUM SERPL-SCNC: 4.4 MMOL/L — SIGNIFICANT CHANGE UP (ref 3.5–5.3)
PROT SERPL-MCNC: 5.1 G/DL — LOW (ref 6–8.3)
RBC # BLD: 4.21 M/UL — SIGNIFICANT CHANGE UP (ref 4.2–5.8)
RBC # FLD: 13 % — SIGNIFICANT CHANGE UP (ref 10.3–14.5)
RBC BLD AUTO: NORMAL — SIGNIFICANT CHANGE UP
S AUREUS DNA NOSE QL NAA+PROBE: SIGNIFICANT CHANGE UP
SODIUM SERPL-SCNC: 147 MMOL/L — HIGH (ref 135–145)
SODIUM SERPL-SCNC: 150 MMOL/L — HIGH (ref 135–145)
SODIUM SERPL-SCNC: 153 MMOL/L — HIGH (ref 135–145)
VARIANT LYMPHS # BLD: 2 % — SIGNIFICANT CHANGE UP (ref 0–6)
WBC # BLD: 9.01 K/UL — SIGNIFICANT CHANGE UP (ref 3.8–10.5)
WBC # FLD AUTO: 9.01 K/UL — SIGNIFICANT CHANGE UP (ref 3.8–10.5)

## 2023-02-12 RX ORDER — SODIUM CHLORIDE 9 MG/ML
1000 INJECTION, SOLUTION INTRAVENOUS
Refills: 0 | Status: DISCONTINUED | OUTPATIENT
Start: 2023-02-12 | End: 2023-02-12

## 2023-02-12 RX ORDER — POTASSIUM PHOSPHATE, MONOBASIC POTASSIUM PHOSPHATE, DIBASIC 236; 224 MG/ML; MG/ML
30 INJECTION, SOLUTION INTRAVENOUS ONCE
Refills: 0 | Status: COMPLETED | OUTPATIENT
Start: 2023-02-12 | End: 2023-02-12

## 2023-02-12 RX ORDER — DEXTROSE MONOHYDRATE, SODIUM CHLORIDE, AND POTASSIUM CHLORIDE 50; .745; 4.5 G/1000ML; G/1000ML; G/1000ML
1000 INJECTION, SOLUTION INTRAVENOUS
Refills: 0 | Status: DISCONTINUED | OUTPATIENT
Start: 2023-02-12 | End: 2023-02-13

## 2023-02-12 RX ORDER — MAGNESIUM SULFATE 500 MG/ML
2 VIAL (ML) INJECTION ONCE
Refills: 0 | Status: COMPLETED | OUTPATIENT
Start: 2023-02-12 | End: 2023-02-12

## 2023-02-12 RX ORDER — INSULIN LISPRO 100/ML
5 VIAL (ML) SUBCUTANEOUS
Refills: 0 | Status: DISCONTINUED | OUTPATIENT
Start: 2023-02-12 | End: 2023-02-13

## 2023-02-12 RX ORDER — CALCIUM GLUCONATE 100 MG/ML
1 VIAL (ML) INTRAVENOUS ONCE
Refills: 0 | Status: COMPLETED | OUTPATIENT
Start: 2023-02-12 | End: 2023-02-12

## 2023-02-12 RX ORDER — POTASSIUM CHLORIDE 20 MEQ
40 PACKET (EA) ORAL EVERY 4 HOURS
Refills: 0 | Status: COMPLETED | OUTPATIENT
Start: 2023-02-12 | End: 2023-02-12

## 2023-02-12 RX ORDER — ONDANSETRON 8 MG/1
4 TABLET, FILM COATED ORAL ONCE
Refills: 0 | Status: COMPLETED | OUTPATIENT
Start: 2023-02-12 | End: 2023-02-14

## 2023-02-12 RX ADMIN — SODIUM CHLORIDE 80 MILLILITER(S): 9 INJECTION, SOLUTION INTRAVENOUS at 11:48

## 2023-02-12 RX ADMIN — CEFTRIAXONE 100 MILLIGRAM(S): 500 INJECTION, POWDER, FOR SOLUTION INTRAMUSCULAR; INTRAVENOUS at 16:56

## 2023-02-12 RX ADMIN — DEXTROSE MONOHYDRATE, SODIUM CHLORIDE, AND POTASSIUM CHLORIDE 100 MILLILITER(S): 50; .745; 4.5 INJECTION, SOLUTION INTRAVENOUS at 22:22

## 2023-02-12 RX ADMIN — Medication 25 GRAM(S): at 11:50

## 2023-02-12 RX ADMIN — Medication 40 MILLIEQUIVALENT(S): at 06:09

## 2023-02-12 RX ADMIN — Medication 100 GRAM(S): at 02:37

## 2023-02-12 RX ADMIN — Medication 12: at 16:55

## 2023-02-12 RX ADMIN — Medication 5 UNIT(S): at 16:55

## 2023-02-12 RX ADMIN — Medication 6: at 11:50

## 2023-02-12 RX ADMIN — Medication 30 MILLIGRAM(S): at 06:56

## 2023-02-12 RX ADMIN — Medication 100 GRAM(S): at 17:24

## 2023-02-12 RX ADMIN — Medication 100 MILLIGRAM(S): at 11:48

## 2023-02-12 RX ADMIN — Medication 8: at 08:20

## 2023-02-12 RX ADMIN — Medication 25 GRAM(S): at 09:13

## 2023-02-12 RX ADMIN — SODIUM CHLORIDE 80 MILLILITER(S): 9 INJECTION, SOLUTION INTRAVENOUS at 11:54

## 2023-02-12 RX ADMIN — INSULIN GLARGINE 10 UNIT(S): 100 INJECTION, SOLUTION SUBCUTANEOUS at 21:17

## 2023-02-12 RX ADMIN — Medication 40 MILLIEQUIVALENT(S): at 01:02

## 2023-02-12 RX ADMIN — POTASSIUM PHOSPHATE, MONOBASIC POTASSIUM PHOSPHATE, DIBASIC 83.33 MILLIMOLE(S): 236; 224 INJECTION, SOLUTION INTRAVENOUS at 09:13

## 2023-02-12 NOTE — PROGRESS NOTE ADULT - SUBJECTIVE AND OBJECTIVE BOX
Patient is a 67y old  Male who presents with a chief complaint of hyperosmolar hyperglycemic state (11 Feb 2023 19:02)    PATIENT IS SEEN AND EXAMINED IN MEDICAL FLOOR.  CHUCK [    ]    SHERMAN [   ]      GT [   ]    ALLERGIES:  No Known Allergies      Daily     Daily     VITALS:    Vital Signs Last 24 Hrs  T(C): 36.6 (12 Feb 2023 05:28), Max: 36.6 (12 Feb 2023 05:28)  T(F): 97.8 (12 Feb 2023 05:28), Max: 97.8 (12 Feb 2023 05:28)  HR: 72 (12 Feb 2023 05:28) (65 - 77)  BP: 138/73 (12 Feb 2023 05:28) (128/76 - 152/73)  BP(mean): 100 (11 Feb 2023 19:43) (84 - 100)  RR: 18 (12 Feb 2023 05:28) (14 - 19)  SpO2: 97% (12 Feb 2023 05:28) (93% - 99%)    Parameters below as of 12 Feb 2023 05:28  Patient On (Oxygen Delivery Method): room air        LABS:    CBC Full  -  ( 12 Feb 2023 07:16 )  WBC Count : 9.01 K/uL  RBC Count : 4.21 M/uL  Hemoglobin : 13.5 g/dL  Hematocrit : 39.2 %  Platelet Count - Automated : 70 K/uL  Mean Cell Volume : 93.1 fl  Mean Cell Hemoglobin : 32.1 pg  Mean Cell Hemoglobin Concentration : 34.4 gm/dL  Auto Neutrophil # : 6.85 K/uL  Auto Lymphocyte # : 1.17 K/uL  Auto Monocyte # : 0.45 K/uL  Auto Eosinophil # : 0.00 K/uL  Auto Basophil # : 0.00 K/uL  Auto Neutrophil % : 75.0 %  Auto Lymphocyte % : 13.0 %  Auto Monocyte % : 5.0 %  Auto Eosinophil % : 0.0 %  Auto Basophil % : 0.0 %      02-12    150<H>  |  116<H>  |  44<H>  ----------------------------<  327<H>  4.4   |  26  |  1.32<H>    Ca    6.3<LL>      12 Feb 2023 07:16  Phos  1.9     02-12  Mg     1.0     02-12    TPro  5.1<L>  /  Alb  1.7<L>  /  TBili  10.3<H>  /  DBili  8.6<H>  /  AST  105<H>  /  ALT  120<H>  /  AlkPhos  144<H>  02-12    CAPILLARY BLOOD GLUCOSE      POCT Blood Glucose.: 340 mg/dL (12 Feb 2023 08:04)  POCT Blood Glucose.: 257 mg/dL (11 Feb 2023 21:49)  POCT Blood Glucose.: 301 mg/dL (11 Feb 2023 18:26)  POCT Blood Glucose.: 311 mg/dL (11 Feb 2023 13:48)        LIVER FUNCTIONS - ( 12 Feb 2023 07:16 )  Alb: 1.7 g/dL / Pro: 5.1 g/dL / ALK PHOS: 144 U/L / ALT: 120 U/L DA / AST: 105 U/L / GGT: x           Creatinine Trend: 1.32<--, 1.49<--, 1.71<--, 1.69<--, 1.76<--, 1.95<--  I&O's Summary    11 Feb 2023 07:01  -  12 Feb 2023 07:00  --------------------------------------------------------  IN: 2340 mL / OUT: 1625 mL / NET: 715 mL            Clean Catch Clean Catch (Midstream)  02-10 @ 02:35   <10,000 CFU/mL Normal Urogenital Nerissa  --  --          MEDICATIONS:    MEDICATIONS  (STANDING):  allopurinol 100 milliGRAM(s) Oral daily  cefTRIAXone   IVPB      cefTRIAXone   IVPB 1000 milliGRAM(s) IV Intermittent every 24 hours  dextrose 5%. 1000 milliLiter(s) (80 mL/Hr) IV Continuous <Continuous>  dextrose 5%. 1000 milliLiter(s) (80 mL/Hr) IV Continuous <Continuous>  insulin glargine Injectable (LANTUS) 10 Unit(s) SubCutaneous at bedtime  insulin lispro (ADMELOG) corrective regimen sliding scale   SubCutaneous three times a day before meals  NIFEdipine XL 30 milliGRAM(s) Oral daily  ondansetron Injectable 4 milliGRAM(s) IV Push once      MEDICATIONS  (PRN):      REVIEW OF SYSTEMS:                           ALL ROS DONE [ X   ]    CONSTITUTIONAL:  LETHARGIC [   ], FEVER [   ], UNRESPONSIVE [   ]  CVS:  CP  [   ], SOB, [   ], PALPITATIONS [   ], DIZZYNESS [   ]  RS: COUGH [   ], SPUTUM [   ]  GI: ABDOMINAL PAIN [   ], NAUSEA [   ], VOMITINGS [   ], DIARRHEA [   ], CONSTIPATION [   ]  :  DYSURIA [   ], NOCTURIA [   ], INCREASED FREQUENCY [   ], DRIBLING [   ],  SKELETAL: PAINFUL JOINTS [   ], SWOLLEN JOINTS [   ], NECK ACHE [   ], LOW BACK ACHE [   ],  SKIN : ULCERS [   ], RASH [   ], ITCHING [   ]  CNS: HEAD ACHE [   ], DOUBLE VISION [   ], BLURRED VISION [   ], AMS / CONFUSION [   ], SEIZURES [   ], WEAKNESS [   ],TINGLING / NUMBNESS [   ]      PHYSICAL EXAMINATION:  GENERAL APPEARANCE: NO DISTRESS  HEENT:  NO PALLOR, NO  JVD,  NO   NODES, NECK SUPPLE  CVS: S1 +, S2 +,   RS: AEEB,  OCCASIONAL  RALES +,   NO RONCHI  ABD: SOFT, NT, NO, BS +  EXT: NO PE  SKIN: WARM,  JAUNDICE  SKELETAL:  ROM ACCEPTABLE  CNS:  AAO X 2-3    RADIOLOGY :    RADIOLOGY AND READINGS REVIEWED    ASSESSMENT :       PLAN:  HPI:  67 year old male PMH CAD s/p CABG, DM, HLD coming in with jaundice and generalized body aches. Patient is lethargic and unable to provide much history. As per ED provider, " his symptoms started about 5 days ago initially with flu-like symptoms and vomiting. went to PMD and was given a nausea medication and felt improved but now feeling worse again. daughter states jaundice is also getting worse. has lost about 10-15lbs in the past week."     (10 Feb 2023 02:52)    # CASE D/W PATIENT VIA [employer]  [facility] ID [id number]    # HHS - RESOLVED  # DM  - ON LANTUS, SSI + FS  - F/U HBA1C    # SEPSIS S/T SUSPECTED PNEUMONIA  - PLACED ON ROCEPHIN, F/U BCX    # KYLE  # HYPERNATREMIA  - ON IVF  - STRICT IS AN OS  - MONITOR CR  - AVOID NEPHROTOXIC AGENTS  - NEPHROLOGY CONSULT    # HYPOKALEMIA  - REPLETING WITH SUPPLEMENT    # JAUNDICE, HYPERBILIRUBINEMIA   - NOTED CT A/P  - F/U RUQ US  - F/U MRCP  - F/U HEPATITIS PANEL, AUTOIMMUNE WORKUP, TUMOR MARKERS, IRON PANEL  - GI CONSULT IN PROGRESS    # CAD s/p CABG  # HTN  - ON PLAVIX  - ON NIFEDIPINE    # ? A.FIB  - ON XARELTO, HOLD    # HLD - ON STATIN, HOLD    # GI PPX     Patient is a 67y old  Male who presents with a chief complaint of hyperosmolar hyperglycemic state (11 Feb 2023 19:02)    PATIENT IS SEEN AND EXAMINED IN MEDICAL FLOOR.    ALLERGIES:  No Known Allergies    VITALS:    Vital Signs Last 24 Hrs  T(C): 36.6 (12 Feb 2023 05:28), Max: 36.6 (12 Feb 2023 05:28)  T(F): 97.8 (12 Feb 2023 05:28), Max: 97.8 (12 Feb 2023 05:28)  HR: 72 (12 Feb 2023 05:28) (65 - 77)  BP: 138/73 (12 Feb 2023 05:28) (128/76 - 152/73)  BP(mean): 100 (11 Feb 2023 19:43) (84 - 100)  RR: 18 (12 Feb 2023 05:28) (14 - 19)  SpO2: 97% (12 Feb 2023 05:28) (93% - 99%)    Parameters below as of 12 Feb 2023 05:28  Patient On (Oxygen Delivery Method): room air        LABS:    CBC Full  -  ( 12 Feb 2023 07:16 )  WBC Count : 9.01 K/uL  RBC Count : 4.21 M/uL  Hemoglobin : 13.5 g/dL  Hematocrit : 39.2 %  Platelet Count - Automated : 70 K/uL  Mean Cell Volume : 93.1 fl  Mean Cell Hemoglobin : 32.1 pg  Mean Cell Hemoglobin Concentration : 34.4 gm/dL  Auto Neutrophil # : 6.85 K/uL  Auto Lymphocyte # : 1.17 K/uL  Auto Monocyte # : 0.45 K/uL  Auto Eosinophil # : 0.00 K/uL  Auto Basophil # : 0.00 K/uL  Auto Neutrophil % : 75.0 %  Auto Lymphocyte % : 13.0 %  Auto Monocyte % : 5.0 %  Auto Eosinophil % : 0.0 %  Auto Basophil % : 0.0 %      02-12    150<H>  |  116<H>  |  44<H>  ----------------------------<  327<H>  4.4   |  26  |  1.32<H>    Ca    6.3<LL>      12 Feb 2023 07:16  Phos  1.9     02-12  Mg     1.0     02-12    TPro  5.1<L>  /  Alb  1.7<L>  /  TBili  10.3<H>  /  DBili  8.6<H>  /  AST  105<H>  /  ALT  120<H>  /  AlkPhos  144<H>  02-12    CAPILLARY BLOOD GLUCOSE      POCT Blood Glucose.: 340 mg/dL (12 Feb 2023 08:04)  POCT Blood Glucose.: 257 mg/dL (11 Feb 2023 21:49)  POCT Blood Glucose.: 301 mg/dL (11 Feb 2023 18:26)  POCT Blood Glucose.: 311 mg/dL (11 Feb 2023 13:48)        LIVER FUNCTIONS - ( 12 Feb 2023 07:16 )  Alb: 1.7 g/dL / Pro: 5.1 g/dL / ALK PHOS: 144 U/L / ALT: 120 U/L DA / AST: 105 U/L / GGT: x           Creatinine Trend: 1.32<--, 1.49<--, 1.71<--, 1.69<--, 1.76<--, 1.95<--  I&O's Summary    11 Feb 2023 07:01  -  12 Feb 2023 07:00  --------------------------------------------------------  IN: 2340 mL / OUT: 1625 mL / NET: 715 mL            Clean Catch Clean Catch (Midstream)  02-10 @ 02:35   <10,000 CFU/mL Normal Urogenital Nerissa  --  --          MEDICATIONS:    MEDICATIONS  (STANDING):  allopurinol 100 milliGRAM(s) Oral daily  cefTRIAXone   IVPB      cefTRIAXone   IVPB 1000 milliGRAM(s) IV Intermittent every 24 hours  dextrose 5%. 1000 milliLiter(s) (80 mL/Hr) IV Continuous <Continuous>  dextrose 5%. 1000 milliLiter(s) (80 mL/Hr) IV Continuous <Continuous>  insulin glargine Injectable (LANTUS) 10 Unit(s) SubCutaneous at bedtime  insulin lispro (ADMELOG) corrective regimen sliding scale   SubCutaneous three times a day before meals  NIFEdipine XL 30 milliGRAM(s) Oral daily  ondansetron Injectable 4 milliGRAM(s) IV Push once      MEDICATIONS  (PRN):      REVIEW OF SYSTEMS:                           ALL ROS DONE [ X   ]    CONSTITUTIONAL:  LETHARGIC [   ], FEVER [   ], UNRESPONSIVE [   ]  CVS:  CP  [   ], SOB, [   ], PALPITATIONS [   ], DIZZYNESS [   ]  RS: COUGH [   ], SPUTUM [   ]  GI: ABDOMINAL PAIN [   ], NAUSEA [   ], VOMITINGS [   ], DIARRHEA [   ], CONSTIPATION [   ]  :  DYSURIA [   ], NOCTURIA [   ], INCREASED FREQUENCY [   ], DRIBLING [   ],  SKELETAL: PAINFUL JOINTS [   ], SWOLLEN JOINTS [   ], NECK ACHE [   ], LOW BACK ACHE [   ],  SKIN : ULCERS [   ], RASH [   ], ITCHING [   ]  CNS: HEAD ACHE [   ], DOUBLE VISION [   ], BLURRED VISION [   ], AMS / CONFUSION [   ], SEIZURES [   ], WEAKNESS [   ],TINGLING / NUMBNESS [   ]      PHYSICAL EXAMINATION:  GENERAL APPEARANCE: NO DISTRESS  HEENT:  NO PALLOR, NO  JVD,  NO   NODES, NECK SUPPLE  CVS: S1 +, S2 +,   RS: AEEB,  OCCASIONAL  RALES +,   NO RONCHI  ABD: SOFT, NT, NO, BS +  EXT: NO PE  SKIN: WARM,  JAUNDICE  SKELETAL:  ROM ACCEPTABLE  CNS:  AAO X 2-3    RADIOLOGY :    RADIOLOGY AND READINGS REVIEWED    ASSESSMENT :       PLAN:  HPI:  67 year old male PMH CAD s/p CABG, DM, HLD coming in with jaundice and generalized body aches. Patient is lethargic and unable to provide much history. As per ED provider, " his symptoms started about 5 days ago initially with flu-like symptoms and vomiting. went to PMD and was given a nausea medication and felt improved but now feeling worse again. daughter states jaundice is also getting worse. has lost about 10-15lbs in the past week."     (10 Feb 2023 02:52)    # CASE D/W PATIENT'S DAUGHTER FINESSE ORLANDO @ [phone], ALL QUESTIONS ANSWERED    # HHS - RESOLVED  # DM  - ON LANTUS, SSI + FS  - F/U HBA1C    # SEPSIS S/T SUSPECTED PNEUMONIA  - PLACED ON ROCEPHIN, F/U BCX    # KYLE  # HYPERNATREMIA  - ON IVF  - STRICT IS AN OS  - MONITOR CR  - AVOID NEPHROTOXIC AGENTS  - NEPHROLOGY CONSULT    # HYPOKALEMIA, HYPOMAGNESEMIA  - REPLETING WITH SUPPLEMENT    # JAUNDICE, HYPERBILIRUBINEMIA   - NOTED CT A/P  - F/U RUQ US  - F/U MRCP  - F/U HEPATITIS PANEL, AUTOIMMUNE WORKUP, TUMOR MARKERS, IRON PANEL  - GI CONSULT IN PROGRESS    # CAD s/p CABG  # HTN  - ON PLAVIX  - ON NIFEDIPINE    # ? A.FIB  - ON XARELTO, HOLD  - DAUGHTER UNSURE, WILL OBTAIN OUTPATIENT RECORDS IF POSSIBLE    # HLD - ON STATIN, HOLD    # GI PPX

## 2023-02-12 NOTE — CONSULT NOTE ADULT - SUBJECTIVE AND OBJECTIVE BOX
C A R D I O L O G Y  *********************    DATE OF SERVICE: 02-12-23    HISTORY OF PRESENT ILLNESS: HPI:  67 year old male PMH CAD s/p CABG, DM, HLD coming in with jaundice and generalized body aches. Patient is lethargic and unable to provide much history. As per ED provider, " his symptoms started about 5 days ago initially with flu-like symptoms and vomiting. went to PMD and was given a nausea medication and felt improved but now feeling worse again. daughter states jaundice is also getting worse. has lost about 10-15lbs in the past week."     (10 Feb 2023 02:52)      PAST MEDICAL & SURGICAL HISTORY:  CAD (coronary artery disease)      Diabetes mellitus      HLD (hyperlipidemia)              MEDICATIONS:  MEDICATIONS  (STANDING):  allopurinol 100 milliGRAM(s) Oral daily  cefTRIAXone   IVPB      cefTRIAXone   IVPB 1000 milliGRAM(s) IV Intermittent every 24 hours  dextrose 5%. 1000 milliLiter(s) (80 mL/Hr) IV Continuous <Continuous>  insulin glargine Injectable (LANTUS) 10 Unit(s) SubCutaneous at bedtime  insulin lispro (ADMELOG) corrective regimen sliding scale   SubCutaneous three times a day before meals  NIFEdipine XL 30 milliGRAM(s) Oral daily  ondansetron Injectable 4 milliGRAM(s) IV Push once      Allergies    No Known Allergies    Intolerances        FAMILY HISTORY:    Non-contributary for premature coronary disease or sudden cardiac death    SOCIAL HISTORY:    [X ] Non-smoker  [ ] Smoker  [ ] Alcohol      REVIEW OF SYSTEMS:  [ ]chest pain  [  ]shortness of breath  [  ]palpitations  [  ]syncope  [ ]near syncope [ ]upper extremity weakness   [ ] lower extremity weakness  [  ]diplopia  [  ]altered mental status   [  ]fevers  [ ]chills [ ]nausea  [ ]vomitting  [  ]dysphagia    [ ]abdominal pain  [ ]melena  [ ]BRBPR    [  ]epistaxis  [  ]rash    [ ]lower extremity edema        [X] All others negative	  [ ] Unable to obtain      LABS:	 	    CARDIAC MARKERS:                            13.5   9.01  )-----------( 70       ( 12 Feb 2023 07:16 )             39.2     Hb Trend: 13.5<--    02-12    150<H>  |  116<H>  |  44<H>  ----------------------------<  327<H>  4.4   |  26  |  1.32<H>    Ca    6.3<LL>      12 Feb 2023 07:16  Phos  1.9     02-12  Mg     1.0     02-12    TPro  5.1<L>  /  Alb  1.7<L>  /  TBili  10.3<H>  /  DBili  8.6<H>  /  AST  105<H>  /  ALT  120<H>  /  AlkPhos  144<H>  02-12    Creatinine Trend: 1.32<--, 1.49<--, 1.71<--, 1.69<--, 1.76<--, 1.95<--    Coags:        PHYSICAL EXAM:  T(C): 36.7 (02-12-23 @ 13:25), Max: 36.7 (02-12-23 @ 13:25)  HR: 74 (02-12-23 @ 13:25) (65 - 74)  BP: 130/71 (02-12-23 @ 13:25) (128/76 - 152/73)  RR: 16 (02-12-23 @ 13:25) (14 - 19)  SpO2: 96% (02-12-23 @ 13:25) (93% - 99%)  Wt(kg): --   BMI (kg/m2): 19.9 (02-10-23 @ 06:18)  I&O's Summary    11 Feb 2023 07:01  -  12 Feb 2023 07:00  --------------------------------------------------------  IN: 2340 mL / OUT: 1625 mL / NET: 715 mL    12 Feb 2023 07:01  -  12 Feb 2023 13:49  --------------------------------------------------------  IN: 0 mL / OUT: 300 mL / NET: -300 mL        HEENT:  (-)icterus (-)pallor  CV: N S1 S2 1/6 DARIEN (+)2 Pulses B/l  Resp:  Clear to ausculatation B/L, normal effort  GI: (+) BS Soft, NT, ND  Lymph:  (-)Edema, (-)obvious lymphadenopathy  Skin: Warm to touch, Normal turgor  Psych: Appropriate mood and affect          ECG:  	Sinus 73 BPM,    RADIOLOGY:         CXR:  No infiltrate     ASSESSMENT/PLAN: 	67y Male  PMH CAD s/p CABG, DM, HLD coming in with jaundice and generalized body aches.    #CAD  - ideally should be on an ASA and statin once plts and LFTs are stable  - echo      # ? PAF  - xarelto on hold  - Obtain outpt records    # HTN  - BP currently acceptable  - Monitor For now    # Jaundice  - GI f/u  - MRCP    I once again thank you for allowing me to participate in the care of your patient.  If you have any questions or concerns please do not hesitate to contact me.    Arben Bhardwaj MD, [facility]  BEEPER [phone]

## 2023-02-12 NOTE — CONSULT NOTE ADULT - SUBJECTIVE AND OBJECTIVE BOX
Full note to follow. [facility] NEPHROLOGY- CONSULTATION NOTE    Patient is a 67y Male who presented to the hospital with weakness, jaundice, vomiting.  Daughter reported to ED that pt had five days of flu-like symptoms, nausea/vomiting, 10-15lb weight loss over 1 week.  Pt was found to have hyperosmolar nonketotic state. Nephrology consulted for persistent electrolyte abnormalities as well as elevated creatinine.      PAST MEDICAL & SURGICAL HISTORY:  CAD (coronary artery disease)      Diabetes mellitus      HLD (hyperlipidemia)        No Known Allergies    Home Medications Reviewed  Hospital Medications:   MEDICATIONS  (STANDING):  allopurinol 100 milliGRAM(s) Oral daily  cefTRIAXone   IVPB      cefTRIAXone   IVPB 1000 milliGRAM(s) IV Intermittent every 24 hours  insulin glargine Injectable (LANTUS) 10 Unit(s) SubCutaneous at bedtime  insulin lispro (ADMELOG) corrective regimen sliding scale   SubCutaneous three times a day before meals  insulin lispro Injectable (ADMELOG) 5 Unit(s) SubCutaneous three times a day before meals  NIFEdipine XL 30 milliGRAM(s) Oral daily  ondansetron Injectable 4 milliGRAM(s) IV Push once    SOCIAL HISTORY:  Denies ETOh,Smoking,   FAMILY HISTORY:    REVIEW OF SYSTEMS:  CONSTITUTIONAL: + weakness, no fevers or chills  EYES/ENT: No visual changes;  No vertigo or throat pain   NECK: No pain or stiffness  RESPIRATORY: No cough, wheezing, hemoptysis; No shortness of breath  CARDIOVASCULAR: No chest pain or palpitations.  GASTROINTESTINAL: No abdominal or epigastric pain. No nausea, vomiting, or hematemesis; No diarrhea or constipation. No melena or hematochezia.  GENITOURINARY: No dysuria, frequency, foamy urine, urinary urgency, incontinence or hematuria  NEUROLOGICAL: No numbness or weakness  SKIN: No itching, burning, rashes, or lesions   VASCULAR: No bilateral lower extremity edema.   All other review of systems is negative unless indicated above.    VITALS:  T(F): 98.1 (23 @ 20:22), Max: 98.1 (23 @ 20:22)  HR: 70 (23 @ 20:22)  BP: 129/72 (23 @ 20:22)  RR: 18 (23 @ 20:22)  SpO2: 96% (23 @ 20:22)  Wt(kg): --     @ 07: @ 07:00  --------------------------------------------------------  IN: 2340 mL / OUT: 1625 mL / NET: 715 mL     @ 07: @ 20:51  --------------------------------------------------------  IN: 0 mL / OUT: 300 mL / NET: -300 mL        PHYSICAL EXAM:  Constitutional: NAD  HEENT: anicteric sclera, oropharynx clear, MMM  Neck: No JVD  Respiratory: CTAB, no wheezes, rales or rhonchi  Cardiovascular: S1, S2, RRR  Gastrointestinal: BS+, soft, NT/ND  Extremities: No cyanosis or clubbing. No peripheral edema  Neurological: A/O x 3, no focal deficits  Psychiatric: Normal mood, normal affect  : No CVA tenderness. No mendoza.   Skin: No rashes      LABS:    147 <--, 150 <--, 153 <--, 154 <--, 159 <--, 161 <--, 159 <--  147<H>  |  112<H>  |  38<H>  ----------------------------<  341<H>  4.0   |  27  |  1.36<H>    Ca    6.0<LL>      2023 13:55  Phos  2.9       Mg     2.2         TPro  5.1<L>  /  Alb  1.7<L>  /  TBili  10.3<H>  /  DBili  8.6<H>  /  AST  105<H>  /  ALT  120<H>  /  AlkPhos  144<H>      Creatinine Trend: 1.36 <--, 1.32 <--, 1.49 <--, 1.71 <--, 1.69 <--, 1.76 <--, 1.95 <--, 2.09 <--, 2.25 <--, 2.42 <--, 3.12 <--, 2.90 <--, 3.84 <--                        13.5   9.01  )-----------( 70       ( 2023 07:16 )             39.2     Urine Studies:  Urinalysis Basic - ( 10 Feb 2023 02:35 )    Color: Yellow / Appearance: Clear / S.010 / pH:   Gluc:  / Ketone: Trace  / Bili: Small / Urobili: 1   Blood:  / Protein: 15 / Nitrite: Negative   Leuk Esterase: Negative / RBC: 2-5 /HPF / WBC 0-2 /HPF   Sq Epi:  / Non Sq Epi: Occasional /HPF / Bacteria: Trace /HPF        RADIOLOGY & ADDITIONAL STUDIES:

## 2023-02-12 NOTE — CONSULT NOTE ADULT - ASSESSMENT
1. KYLE hemodynamically-mediated in the setting of HHS, dehydration, n/v, improved with IVF.  2. Hypernatremia- due to dehydration in the setting of N/V, poor po intake, HHS.   Initial Na+ when corrected for severe hyperglycemia is 163.  Hypernatremia is improving with IV hydration.  Rate of improvement is borderline, but still acceptable. Continue IVF, but change to gentle isotonic IVF.  3. Hypokalemia- In the setting of dehydration, vomiting, insulin for HHS.  May worsen with isotonic IVF.  Will place K+ in IVF and monitor K+  4. Hypoalbuminemia- most likely due to malnutrition.  Likely longer period of time over which pt has had weight loss and poor oral intake.  ?underlying malignancy? Check urine protein/creatine ratio to r/o proteinuria.  5. Hypercalcemia- when corrected for albumin, pt had hypercalcemia due to dehydration, improved with IVF.  6. Hypophosphatemia improved with repletion.  Cont to monitor.    [facility] NEPHROLOGY  Devyn Palmer M.D.  Buster York D.O.  Padmini Jose M.D.  Allison Phillip, MSN, ANP-C    Telephone: [phone]  Facsimile: [phone] [place] [address], [address]  [place] [address]

## 2023-02-13 DIAGNOSIS — E78.5 HYPERLIPIDEMIA, UNSPECIFIED: ICD-10-CM

## 2023-02-13 DIAGNOSIS — E80.6 OTHER DISORDERS OF BILIRUBIN METABOLISM: ICD-10-CM

## 2023-02-13 DIAGNOSIS — Z02.9 ENCOUNTER FOR ADMINISTRATIVE EXAMINATIONS, UNSPECIFIED: ICD-10-CM

## 2023-02-13 DIAGNOSIS — J18.9 PNEUMONIA, UNSPECIFIED ORGANISM: ICD-10-CM

## 2023-02-13 DIAGNOSIS — E87.0 HYPEROSMOLALITY AND HYPERNATREMIA: ICD-10-CM

## 2023-02-13 DIAGNOSIS — I48.91 UNSPECIFIED ATRIAL FIBRILLATION: ICD-10-CM

## 2023-02-13 DIAGNOSIS — N17.9 ACUTE KIDNEY FAILURE, UNSPECIFIED: ICD-10-CM

## 2023-02-13 DIAGNOSIS — E87.8 OTHER DISORDERS OF ELECTROLYTE AND FLUID BALANCE, NOT ELSEWHERE CLASSIFIED: ICD-10-CM

## 2023-02-13 DIAGNOSIS — E11.9 TYPE 2 DIABETES MELLITUS WITHOUT COMPLICATIONS: ICD-10-CM

## 2023-02-13 DIAGNOSIS — R17 UNSPECIFIED JAUNDICE: ICD-10-CM

## 2023-02-13 DIAGNOSIS — I25.10 ATHEROSCLEROTIC HEART DISEASE OF NATIVE CORONARY ARTERY WITHOUT ANGINA PECTORIS: ICD-10-CM

## 2023-02-13 DIAGNOSIS — E11.00 TYPE 2 DIABETES MELLITUS WITH HYPEROSMOLARITY WITHOUT NONKETOTIC HYPERGLYCEMIC-HYPEROSMOLAR COMA (NKHHC): ICD-10-CM

## 2023-02-13 LAB
24R-OH-CALCIDIOL SERPL-MCNC: 20.4 NG/ML — LOW (ref 30–80)
ALBUMIN SERPL ELPH-MCNC: 1.7 G/DL — LOW (ref 3.5–5)
ALDOST SERPL-MCNC: 5.6 NG/DL — SIGNIFICANT CHANGE UP
ALP SERPL-CCNC: 139 U/L — HIGH (ref 40–120)
ALT FLD-CCNC: 88 U/L DA — HIGH (ref 10–60)
ANA PAT FLD IF-IMP: ABNORMAL
ANA TITR SER: ABNORMAL
ANION GAP SERPL CALC-SCNC: 10 MMOL/L — SIGNIFICANT CHANGE UP (ref 5–17)
AST SERPL-CCNC: 48 U/L — HIGH (ref 10–40)
BASOPHILS # BLD AUTO: 0.03 K/UL — SIGNIFICANT CHANGE UP (ref 0–0.2)
BASOPHILS NFR BLD AUTO: 0.3 % — SIGNIFICANT CHANGE UP (ref 0–2)
BILIRUB DIRECT SERPL-MCNC: 6.6 MG/DL — HIGH (ref 0–0.3)
BILIRUB INDIRECT FLD-MCNC: 1.6 MG/DL — HIGH (ref 0.2–1)
BILIRUB SERPL-MCNC: 8.2 MG/DL — HIGH (ref 0.2–1.2)
BILIRUB SERPL-MCNC: 8.2 MG/DL — HIGH (ref 0.2–1.2)
BUN SERPL-MCNC: 29 MG/DL — HIGH (ref 7–18)
CALCIUM SERPL-MCNC: 6 MG/DL — CRITICAL LOW (ref 8.4–10.5)
CHLORIDE SERPL-SCNC: 111 MMOL/L — HIGH (ref 96–108)
CO2 SERPL-SCNC: 26 MMOL/L — SIGNIFICANT CHANGE UP (ref 22–31)
CREAT SERPL-MCNC: 0.99 MG/DL — SIGNIFICANT CHANGE UP (ref 0.5–1.3)
EGFR: 83 ML/MIN/1.73M2 — SIGNIFICANT CHANGE UP
EOSINOPHIL # BLD AUTO: 0.05 K/UL — SIGNIFICANT CHANGE UP (ref 0–0.5)
EOSINOPHIL NFR BLD AUTO: 0.4 % — SIGNIFICANT CHANGE UP (ref 0–6)
GLUCOSE BLDC GLUCOMTR-MCNC: 143 MG/DL — HIGH (ref 70–99)
GLUCOSE BLDC GLUCOMTR-MCNC: 194 MG/DL — HIGH (ref 70–99)
GLUCOSE BLDC GLUCOMTR-MCNC: 206 MG/DL — HIGH (ref 70–99)
GLUCOSE BLDC GLUCOMTR-MCNC: 217 MG/DL — HIGH (ref 70–99)
GLUCOSE SERPL-MCNC: 235 MG/DL — HIGH (ref 70–99)
HCT VFR BLD CALC: 38.2 % — LOW (ref 39–50)
HGB BLD-MCNC: 13.2 G/DL — SIGNIFICANT CHANGE UP (ref 13–17)
IMM GRANULOCYTES NFR BLD AUTO: 4.3 % — HIGH (ref 0–0.9)
INR BLD: 1.12 RATIO — SIGNIFICANT CHANGE UP (ref 0.88–1.16)
LYMPHOCYTES # BLD AUTO: 1.16 K/UL — SIGNIFICANT CHANGE UP (ref 1–3.3)
LYMPHOCYTES # BLD AUTO: 10.2 % — LOW (ref 13–44)
MAGNESIUM SERPL-MCNC: 1.4 MG/DL — LOW (ref 1.6–2.6)
MCHC RBC-ENTMCNC: 31.9 PG — SIGNIFICANT CHANGE UP (ref 27–34)
MCHC RBC-ENTMCNC: 34.6 GM/DL — SIGNIFICANT CHANGE UP (ref 32–36)
MCV RBC AUTO: 92.3 FL — SIGNIFICANT CHANGE UP (ref 80–100)
MONOCYTES # BLD AUTO: 0.49 K/UL — SIGNIFICANT CHANGE UP (ref 0–0.9)
MONOCYTES NFR BLD AUTO: 4.3 % — SIGNIFICANT CHANGE UP (ref 2–14)
NEUTROPHILS # BLD AUTO: 9.15 K/UL — HIGH (ref 1.8–7.4)
NEUTROPHILS NFR BLD AUTO: 80.5 % — HIGH (ref 43–77)
NRBC # BLD: 0 /100 WBCS — SIGNIFICANT CHANGE UP (ref 0–0)
PHOSPHATE SERPL-MCNC: 1.7 MG/DL — LOW (ref 2.5–4.5)
PLATELET # BLD AUTO: 84 K/UL — LOW (ref 150–400)
POTASSIUM SERPL-MCNC: 3.5 MMOL/L — SIGNIFICANT CHANGE UP (ref 3.5–5.3)
POTASSIUM SERPL-SCNC: 3.5 MMOL/L — SIGNIFICANT CHANGE UP (ref 3.5–5.3)
PROT SERPL-MCNC: 5 G/DL — LOW (ref 6–8.3)
PROTHROM AB SERPL-ACNC: 13.4 SEC — SIGNIFICANT CHANGE UP (ref 10.5–13.4)
RBC # BLD: 4.14 M/UL — LOW (ref 4.2–5.8)
RBC # FLD: 13.1 % — SIGNIFICANT CHANGE UP (ref 10.3–14.5)
RENIN DIRECT, PLASMA: 2.7 PG/ML — SIGNIFICANT CHANGE UP
SODIUM SERPL-SCNC: 147 MMOL/L — HIGH (ref 135–145)
WBC # BLD: 11.37 K/UL — HIGH (ref 3.8–10.5)
WBC # FLD AUTO: 11.37 K/UL — HIGH (ref 3.8–10.5)

## 2023-02-13 RX ORDER — INSULIN LISPRO 100/ML
8 VIAL (ML) SUBCUTANEOUS
Refills: 0 | Status: DISCONTINUED | OUTPATIENT
Start: 2023-02-13 | End: 2023-02-15

## 2023-02-13 RX ORDER — CALCIUM GLUCONATE 100 MG/ML
1 VIAL (ML) INTRAVENOUS ONCE
Refills: 0 | Status: COMPLETED | OUTPATIENT
Start: 2023-02-13 | End: 2023-02-13

## 2023-02-13 RX ORDER — POTASSIUM PHOSPHATE, MONOBASIC POTASSIUM PHOSPHATE, DIBASIC 236; 224 MG/ML; MG/ML
30 INJECTION, SOLUTION INTRAVENOUS ONCE
Refills: 0 | Status: COMPLETED | OUTPATIENT
Start: 2023-02-13 | End: 2023-02-13

## 2023-02-13 RX ORDER — INSULIN LISPRO 100/ML
VIAL (ML) SUBCUTANEOUS
Refills: 0 | Status: DISCONTINUED | OUTPATIENT
Start: 2023-02-13 | End: 2023-02-13

## 2023-02-13 RX ORDER — MAGNESIUM SULFATE 500 MG/ML
1 VIAL (ML) INJECTION ONCE
Refills: 0 | Status: COMPLETED | OUTPATIENT
Start: 2023-02-13 | End: 2023-02-13

## 2023-02-13 RX ORDER — DEXTROSE MONOHYDRATE, SODIUM CHLORIDE, AND POTASSIUM CHLORIDE 50; .745; 4.5 G/1000ML; G/1000ML; G/1000ML
1000 INJECTION, SOLUTION INTRAVENOUS
Refills: 0 | Status: DISCONTINUED | OUTPATIENT
Start: 2023-02-13 | End: 2023-02-15

## 2023-02-13 RX ORDER — INSULIN GLARGINE 100 [IU]/ML
10 INJECTION, SOLUTION SUBCUTANEOUS AT BEDTIME
Refills: 0 | Status: DISCONTINUED | OUTPATIENT
Start: 2023-02-13 | End: 2023-02-18

## 2023-02-13 RX ADMIN — Medication 100 GRAM(S): at 14:14

## 2023-02-13 RX ADMIN — Medication 8 UNIT(S): at 17:13

## 2023-02-13 RX ADMIN — Medication 100 GRAM(S): at 08:38

## 2023-02-13 RX ADMIN — Medication 100 MILLIGRAM(S): at 14:14

## 2023-02-13 RX ADMIN — DEXTROSE MONOHYDRATE, SODIUM CHLORIDE, AND POTASSIUM CHLORIDE 80 MILLILITER(S): 50; .745; 4.5 INJECTION, SOLUTION INTRAVENOUS at 10:58

## 2023-02-13 RX ADMIN — Medication 30 MILLIGRAM(S): at 05:32

## 2023-02-13 RX ADMIN — INSULIN GLARGINE 10 UNIT(S): 100 INJECTION, SOLUTION SUBCUTANEOUS at 21:57

## 2023-02-13 RX ADMIN — Medication 100 GRAM(S): at 10:45

## 2023-02-13 RX ADMIN — CEFTRIAXONE 100 MILLIGRAM(S): 500 INJECTION, POWDER, FOR SOLUTION INTRAMUSCULAR; INTRAVENOUS at 19:07

## 2023-02-13 RX ADMIN — Medication 4: at 08:48

## 2023-02-13 RX ADMIN — POTASSIUM PHOSPHATE, MONOBASIC POTASSIUM PHOSPHATE, DIBASIC 83.33 MILLIMOLE(S): 236; 224 INJECTION, SOLUTION INTRAVENOUS at 08:43

## 2023-02-13 RX ADMIN — Medication 4: at 17:12

## 2023-02-13 NOTE — PROGRESS NOTE ADULT - SUBJECTIVE AND OBJECTIVE BOX
C A R D I O L O G Y  **********************************     DATE OF SERVICE: 02-13-23    Resting comfortable offers no complaints   	  MEDICATIONS:  MEDICATIONS  (STANDING):  allopurinol 100 milliGRAM(s) Oral daily  cefTRIAXone   IVPB      cefTRIAXone   IVPB 1000 milliGRAM(s) IV Intermittent every 24 hours  insulin glargine Injectable (LANTUS) 10 Unit(s) SubCutaneous at bedtime  insulin lispro (ADMELOG) corrective regimen sliding scale   SubCutaneous three times a day before meals  insulin lispro Injectable (ADMELOG) 5 Unit(s) SubCutaneous three times a day before meals  NIFEdipine XL 30 milliGRAM(s) Oral daily  ondansetron Injectable 4 milliGRAM(s) IV Push once  sodium chloride 0.45% with potassium chloride 20 mEq/L 1000 milliLiter(s) (80 mL/Hr) IV Continuous <Continuous>      LABS:	 	    CARDIAC MARKERS:                          13.2   11.37 )-----------( 84       ( 13 Feb 2023 06:03 )             38.2     Hemoglobin: 13.2 g/dL (02-13 @ 06:03)  Hemoglobin: 13.5 g/dL (02-12 @ 07:16)  Hemoglobin: 14.0 g/dL (02-11 @ 03:46)  Hemoglobin: 13.3 g/dL (02-10 @ 12:37)  Hemoglobin: 15.5 g/dL (02-10 @ 00:23)      02-13    147<H>  |  111<H>  |  29<H>  ----------------------------<  235<H>  3.5   |  26  |  0.99    Ca    6.0<LL>      13 Feb 2023 06:03  Phos  1.7     02-13  Mg     1.4     02-13    TPro  5.0<L>  /  Alb  1.7<L>  /  TBili  8.2<H>  /  DBili  6.6<H>  /  AST  48<H>  /  ALT  88<H>  /  AlkPhos  139<H>  02-13    Creatinine Trend: 0.99<--, 1.36<--, 1.32<--, 1.49<--, 1.71<--, 1.69<--    COAGS:   PT/INR - ( 13 Feb 2023 06:03 )   PT: 13.4 sec;   INR: 1.12 ratio        PHYSICAL EXAM:  T(C): 36.6 (02-13-23 @ 05:34), Max: 36.7 (02-12-23 @ 13:25)  HR: 69 (02-13-23 @ 05:34) (69 - 74)  BP: 130/74 (02-13-23 @ 05:34) (129/72 - 130/74)  RR: 18 (02-13-23 @ 05:34) (16 - 18)  SpO2: 98% (02-13-23 @ 05:34) (96% - 98%)  Wt(kg): --  I&O's Summary    12 Feb 2023 07:01  -  13 Feb 2023 07:00  --------------------------------------------------------  IN: 0 mL / OUT: 300 mL / NET: -300 mL        HEENT:  (-)icterus (-)pallor  CV: N S1 S2 1/6 DARIEN (+)2 Pulses B/l  Resp:  Clear to ausculatation B/L, normal effort  GI: (+) BS Soft, NT, ND  Lymph:  (-)Edema, (-)obvious lymphadenopathy  Skin: Warm to touch, Normal turgor  Psych: Appropriate mood and affect          ASSESSMENT/PLAN: 	67y  Male   PMH CAD s/p CABG, DM, HLD coming in with jaundice and generalized body aches.    #CAD  - ideally should be on an ASA and statin once plts and LFTs are stable  - echo      # ? PAF  - xarelto on hold  - Obtain outpt records    # HTN  - BP currently acceptable  - Monitor For now    # Jaundice  - GI f/u  - MRCP    Arben Bhardwaj MD, [facility]  BEEPER [phone]

## 2023-02-13 NOTE — CONSULT NOTE ADULT - PROBLEM SELECTOR RECOMMENDATION 5
continue management per medicine/cardiology  -daughter mentioned that previously said patient is s/p CABG when actually he is s/p stenting

## 2023-02-13 NOTE — CONSULT NOTE ADULT - PROBLEM SELECTOR RECOMMENDATION 9
s/p ICU admission for HHS with hypokalemia   -A1c 9.1  -still hyperglycemic to 200s despite SSI, 5U premeal, and 10U basal insulin  -increase Lantus to 12U qhs  -increase pre-meal Lispro to 8U TID AC  -c/w moderate SSI  -FS ACHS or q6 if NPO  -Insulin administration education s/p ICU admission for HHS with hypokalemia   -A1c 9.1  -still hyperglycemic to 200s despite SSI, 5U premeal, and 10U basal insulin  -increase Lantus to 12U qhs  -increase pre-meal Lispro to 8U TID AC- hold if npo  -c/w moderate SSI  -FS ACHS or q6 if NPO  -Insulin administration education

## 2023-02-13 NOTE — CONSULT NOTE ADULT - ASSESSMENT
Patient states he was diagnosed with diabetes 7-8 years ago and has never been on insulin. Reports taking medications (jardiance and janumet) regularly. Per daughter when the patient started feeling sick around a week before admission he was not taking his medications but was still eating when he could despite poor appetite. At that time the patient's finger sticks at home were noted to be higher than usual. She noticed the patient's skin beginning to turn yellow and reports that the patient was urinating frequently with very yellow and "bubbly" urine. Just prior to coming to the hospital the patient had increased thirst and very dry mouth that made it difficult for him to swallow. Daughter says that she spoke with patient's PCP recently who said that the patient's blood sugars had been well controlled and his last A1c outpatient was 7. Patient eats a regular diet consisting of meat, fish, vegetables and rice and generally avoids sugary foods and sodas. Denies any recent changes to diet. Patient usually walks and does light exercise in the park every morning. Patient says that he has seen ophthalmologist and podiatrist a very long time ago but daughter is unaware if this is true. Development of HHS possibly contributed to by medication non-compliance in setting of recent flu like illness, however CT findings showing cystic lesion in the body of pancreas and increased direct hyperbilirubinemia concerning for a direct process affecting pancreatic function.

## 2023-02-13 NOTE — PROGRESS NOTE ADULT - ASSESSMENT
67 year old Mandarin speaking male with CAD s/p CABG, DM, HLD a/w jaundice, HHS, and KYLE. Nephrology consulted for Elevated serum creatinine.    1. KYLE hemodynamically-mediated in the setting of HHS, dehydration with n/v, KYLE resolving with IVF. Strict I/Os. Avoid nephrotoxins/ NSAIDs/ RCA. Monitor BMP.  2. Hypernatremia- due to dehydration in the setting of N/V, poor po intake, HHS.   Initial Na+ when corrected for severe hyperglycemia is 163.  Hypernatremia improving will change IVF to 1/2NS with 20 meq KCL @ 80 ml/hr.   Monitor serum Na.   3. Hypokalemia- resolved. c/w IVF with potassium. Monitor serum K+  4. Hypoalbuminemia- most likely due to malnutrition.  Likely longer period of time over which pt has had weight loss and poor oral intake.  ?underlying malignancy? Check urine protein/creatine ratio to r/o proteinuria.  5. Hypercalcemia- when corrected for albumin, pt had hypercalcemia due to dehydration, improved with IVF.  6. Hypophosphatemia improved with repletion.  Cont to monitor.    [facility] NEPHROLOGY  Devyn Palmer M.D.  Buster York D.O.  Padmini Jose M.D.  Allison Phillip, MSN, ANP-C    Telephone: [phone]  Facsimile: [phone] [place] [address], [address]  [place] [address]   67 year old Mandarin speaking male with CAD s/p CABG, DM, HLD a/w jaundice, HHS, and KYLE. Nephrology consulted for Elevated serum creatinine.    1. KYLE hemodynamically-mediated in the setting of HHS, dehydration with n/v, KYLE resolving with IVF. Strict I/Os. Avoid nephrotoxins/ NSAIDs/ RCA. Monitor BMP.  2. Hypernatremia- due to dehydration in the setting of N/V, poor po intake, HHS.   Initial Na+ when corrected for severe hyperglycemia is 163.  Hypernatremia improving will change IVF to 1/2NS with 20 meq KCL @ 80 ml/hr.   Monitor serum Na.   3. Hypokalemia- resolved. c/w IVF with potassium. Monitor serum K+  4. Hypoalbuminemia- most likely due to malnutrition.  Likely longer period of time over which pt has had weight loss and poor oral intake.  ?underlying malignancy? Check urine protein/creatine ratio to r/o proteinuria.  5. Hypercalcemia- due to dehydration. Now with hypocalcemia; Ca 7.84. Will give IV calcium since pt NPO. Chck 25 oH vit D and PTHi. Monitor ionized Ca.   6. Hypophosphatemia- pt given Kphos IVx1. HypoMg- pt give Mg 1g Iv x2 already. Monitor lytes.    [facility] NEPHROLOGY  Devyn Palmer M.D.  Buster York D.O.  Padmini Jose M.D.  Allison Phillip, MSN, ANP-C    Telephone: [phone]  Facsimile: [phone]    [facility]-[address], [address]  [place] [address]

## 2023-02-13 NOTE — CONSULT NOTE ADULT - SUBJECTIVE AND OBJECTIVE BOX
Patient is a 67y old  Male who presents with a chief complaint of hyperosmolar hyperglycemic state (13 Feb 2023 11:52)      HPI:  Patient is a 66 yo Male, from home, with PMH CAD s/p CABG, DM, HLD presenting with jaundice and lethargy. Patient is lethargic and unable to provide much history. As per ED provider, "his symptoms started about 5 days ago initially with flu-like symptoms and vomiting. went to PMD and was given a nausea medication and felt improved but now feeling worse again. Daughter states jaundice is also getting worse. Has lost about 10-15lbs in the past week."   Patient was noted to have blood glucose > 1000 with severe hypokalemia, KYLE, and elevated bilirubin levels. He was admitted to the ICU for HHS management. He received 6L IVF boluses and potassium supplement of  40 meq x4 and placed on IVF with to increase potassium from 2.6 to more than 3.5 for insulin drip, follow up aldosterone and rennin levels. Patient's blood sugar levels were trended down therefore he was placed on an insulin sliding scale every hour instead of a drip, he received a total of 28 units and was transitions to clear liquid diet with basal Lantus of 10 units. Patient was also found to have elevated bilirubin of 14.5, CT abdomen and pelvis was performed, no obstructing lesions found there was a small 3mm pancreatic cyst and tree in bud opacities in right middle lobe. Empiric ceftriaxone was started. GI Dr. Stroud consulted, MRCP ordered. Follow up Hep a/b/c, anti-smooth muscle antibody anti-mitochondrial antibody LDH, IgG subset, CEA, , AFP, MARA, ceruloplasmin, alpha 1 antitrypsin, iron panel. Patient also had thrombocytopenia, no signs of bleeding, normal morphology with no schistocytes. D5w was started for hypernatremia. Lion was discontinued, urine culture was negative. Patient was downgraded to medicine floors. Continues to have uncontrolled hyperglycemia despite management with moderate sliding scale insulin, 5U premeal and 10U basal insulin,    Information gathered directly from patient at bedside via Mandarin  ([id number]) and from the patient's daughter over the phone. Patient states he was diagnosed with diabetes 7-8 years ago and has never been on insulin. Reports taking medications (jardiance and janumet) regularly. Per daughter when the patient started feeling sick around a week before admission he was not taking his medications but was still eating when he could despite poor appetite. At that time the patient's finger sticks at home were noted to be higher than usual. She noticed the patient's skin beginning to turn yellow and reports that the patient was urinating frequently with very yellow and "bubbly" urine. Just prior to coming to the hospital the patient had increased thirst and very dry mouth that made it difficult for him to swallow. Daughter says that she spoke with patient's PCP recently who said that the patient's blood sugars had been well controlled and his last A1c outpatient was 7. Patient eats a regular diet consisting of meat, fish, vegetables and rice and generally avoids sugary foods and sodas. Denies any recent changes to diet. Patient usually walks and does light exercise in the park every morning. Patient says that he has seen ophthalmologist and podiatrist a very long time ago but daughter is unaware if this is true.  Patient reports feeling better now and currently complaining of fatigue and hunger.       (10 Feb 2023 02:52)      PAST MEDICAL & SURGICAL HISTORY:  CAD (coronary artery disease)      Diabetes mellitus      HLD (hyperlipidemia)             MEDICATIONS  (STANDING):  allopurinol 100 milliGRAM(s) Oral daily  cefTRIAXone   IVPB      cefTRIAXone   IVPB 1000 milliGRAM(s) IV Intermittent every 24 hours  insulin glargine Injectable (LANTUS) 10 Unit(s) SubCutaneous at bedtime  insulin lispro (ADMELOG) corrective regimen sliding scale   SubCutaneous three times a day before meals  insulin lispro Injectable (ADMELOG) 5 Unit(s) SubCutaneous three times a day before meals  NIFEdipine XL 30 milliGRAM(s) Oral daily  ondansetron Injectable 4 milliGRAM(s) IV Push once  sodium chloride 0.45% with potassium chloride 20 mEq/L 1000 milliLiter(s) (80 mL/Hr) IV Continuous <Continuous>    MEDICATIONS  (PRN):      FAMILY HISTORY:      SOCIAL HISTORY:      REVIEW OF SYSTEMS:  CONSTITUTIONAL: No fever, +weight loss, +fatigue  EYES: No eye pain, visual disturbances, or discharge  ENT:  No difficulty hearing, tinnitus, vertigo; No sinus or throat pain  NECK: No pain or stiffness  RESPIRATORY: No cough, wheezing, chills or hemoptysis; No Shortness of Breath  CARDIOVASCULAR: No chest pain, palpitations, passing out, dizziness, or leg swelling  GASTROINTESTINAL: No abdominal or epigastric pain. No nausea, vomiting, or hematemesis; No diarrhea or constipation. No melena or hematochezia.  GENITOURINARY: No dysuria, frequency, hematuria, or incontinence  NEUROLOGICAL: No headaches, memory loss, loss of strength, numbness, or tremors  SKIN: No itching, burning, rashes, or lesions   LYMPH Nodes: No enlarged glands  ENDOCRINE: No heat or cold intolerance; No hair loss  MUSCULOSKELETAL: No joint pain or swelling; No muscle, back, or extremity pain  PSYCHIATRIC: No depression, anxiety, mood swings, or difficulty sleeping  HEME/LYMPH: No easy bruising, or bleeding gums  ALLERGY AND IMMUNOLOGIC: No hives or eczema	      LOS: 3d    VITALS:   T(C): 36.6 (02-13-23 @ 05:34), Max: 36.7 (02-12-23 @ 13:25)  HR: 69 (02-13-23 @ 05:34) (69 - 74)  BP: 130/74 (02-13-23 @ 05:34) (129/72 - 130/74)  RR: 18 (02-13-23 @ 05:34) (16 - 18)  SpO2: 98% (02-13-23 @ 05:34) (96% - 98%)    GENERAL: NAD, lethargic, cachectic, lying in bed comfortably  HEAD:  Atraumatic, Normocephalic  EYES: EOMI, PERRLA, +scleral icterus  ENT: Moist mucous membranes  NECK: Supple, No JVD  CHEST/LUNG: Clear to auscultation bilaterally; No rales, rhonchi, wheezing, or rubs. Unlabored respirations  HEART: Regular rate and rhythm; No murmurs, rubs, or gallops  ABDOMEN: BSx4; Soft, nontender, nondistended  EXTREMITIES:  2+ Peripheral Pulses, brisk capillary refill. No clubbing, cyanosis, or edema  NERVOUS SYSTEM:  A&Ox3, no focal deficits   SKIN: +jaundice        LABS:                        13.2   11.37 )-----------( 84       ( 13 Feb 2023 06:03 )             38.2     02-13    147<H>  |  111<H>  |  29<H>  ----------------------------<  235<H>  3.5   |  26  |  0.99    Ca    6.0<LL>      13 Feb 2023 06:03  Phos  1.7     02-13  Mg     1.4     02-13    TPro  5.0<L>  /  Alb  1.7<L>  /  TBili  8.2<H>  /  DBili  6.6<H>  /  AST  48<H>  /  ALT  88<H>  /  AlkPhos  139<H>  02-13        PT/INR - ( 13 Feb 2023 06:03 )   PT: 13.4 sec;   INR: 1.12 ratio             CAPILLARY BLOOD GLUCOSE      POCT Blood Glucose.: 194 mg/dL (13 Feb 2023 11:32)  POCT Blood Glucose.: 217 mg/dL (13 Feb 2023 08:15)  POCT Blood Glucose.: 249 mg/dL (12 Feb 2023 21:05)  POCT Blood Glucose.: 409 mg/dL (12 Feb 2023 16:49)      RADIOLOGY & ADDITIONAL STUDIES:  < from: CT Abdomen and Pelvis No Cont (02.10.23 @ 05:54) >  INTERPRETATION:  CLINICAL INFORMATION: Jaundice and vomiting.    COMPARISON: None.    CONTRAST/COMPLICATIONS:  IV Contrast: NONE  Evaluation of the visceral organs is limited without   intravenous contrast  Oral Contrast: NONE  Complications: None reported at time of study completion    PROCEDURE:  CT of the Abdomen and Pelvis was performed.  Sagittal and coronal reformats were performed.    FINDINGS:  LOWER CHEST: Dependent airspace opacities in bilateral lower lobes.   Branching "tree in bud" opacities in the right middle lobe. Coronary   artery stents.    LIVER: Within normal limits.  BILE DUCTS: Normal caliber.  GALLBLADDER: Within normal limits.  SPLEEN: Within normal limits.  PANCREAS: 3 mm cystic lesion in the body of the pancreas.  ADRENALS: Within normal limits.  KIDNEYS/URETERS: Within normal limits.    BLADDER: Lion catheter balloon within the urinary bladder.   Intravesicular gas secondary to instrumentation.  REPRODUCTIVE ORGANS: Enlarged prostate to 5.6 cm.    BOWEL: Small second and third portion duodenal diverticula. No bowel   obstruction or inflammation. Appendix is normal.  PERITONEUM: No ascites.  VESSELS: Within normal limits.  RETROPERITONEUM/LYMPH NODES: No lymphadenopathy.  ABDOMINAL WALL: Within normal limits.  BONES: Mild degenerative changes of the spine. Bilateral L5 pars   interarticularis defects without spondylolisthesis.    IMPRESSION:  1. No bowel obstruction or inflammation.  2. Tiny cystic lesion in the body of the pancreas. A follow-up   contrast-enhanced MR pancreas protocol is recommended in 2 years.  3. Branching "tree in bud" opacities in the right middle lobe likely   infectious in etiology. Dependent airspace opacities in bilateral lower   lobes which could be due to atelectasis versus an infectious etiology in   the correct clinical setting.    < end of copied text >

## 2023-02-13 NOTE — PROGRESS NOTE ADULT - SUBJECTIVE AND OBJECTIVE BOX
NP Note discussed with  primary attending    Patient is a 67y old  Male who presents with a chief complaint of hyperosmolar hyperglycemic state (13 Feb 2023 11:52)      INTERVAL HPI/OVERNIGHT EVENTS: no new complaints    MEDICATIONS  (STANDING):  allopurinol 100 milliGRAM(s) Oral daily  cefTRIAXone   IVPB      cefTRIAXone   IVPB 1000 milliGRAM(s) IV Intermittent every 24 hours  insulin glargine Injectable (LANTUS) 10 Unit(s) SubCutaneous at bedtime  insulin lispro (ADMELOG) corrective regimen sliding scale   SubCutaneous three times a day before meals  insulin lispro Injectable (ADMELOG) 5 Unit(s) SubCutaneous three times a day before meals  NIFEdipine XL 30 milliGRAM(s) Oral daily  ondansetron Injectable 4 milliGRAM(s) IV Push once  sodium chloride 0.45% with potassium chloride 20 mEq/L 1000 milliLiter(s) (80 mL/Hr) IV Continuous <Continuous>    MEDICATIONS  (PRN):      __________________________________________________  REVIEW OF SYSTEMS:    CONSTITUTIONAL: No fever,   EYES: no acute visual disturbances  NECK: No pain or stiffness  RESPIRATORY: No cough; No shortness of breath  CARDIOVASCULAR: No chest pain, no palpitations  GASTROINTESTINAL: No pain. No nausea or vomiting; No diarrhea   NEUROLOGICAL: No headache or numbness, no tremors  MUSCULOSKELETAL: No joint pain, no muscle pain  GENITOURINARY: no dysuria, no frequency, no hesitancy  PSYCHIATRY: no depression , no anxiety  ALL OTHER  ROS negative        Vital Signs Last 24 Hrs  T(C): 36.6 (13 Feb 2023 05:34), Max: 36.7 (12 Feb 2023 13:25)  T(F): 97.8 (13 Feb 2023 05:34), Max: 98.1 (12 Feb 2023 20:22)  HR: 69 (13 Feb 2023 05:34) (69 - 74)  BP: 130/74 (13 Feb 2023 05:34) (129/72 - 130/74)  BP(mean): --  RR: 18 (13 Feb 2023 05:34) (16 - 18)  SpO2: 98% (13 Feb 2023 05:34) (96% - 98%)    Parameters below as of 13 Feb 2023 05:34  Patient On (Oxygen Delivery Method): room air        ________________________________________________  PHYSICAL EXAM:  GENERAL: NAD  HEENT: Normocephalic;  + conjunctivae and sclerae jaundice; moist mucous membranes;   NECK : supple  CHEST/LUNG: Clear to ausculitation bilaterally with good air entry   HEART: S1 S2  regular; no murmurs, gallops or rubs  ABDOMEN: Soft, Nontender, Nondistended; Bowel sounds present  EXTREMITIES: no cyanosis; no edema; no calf tenderness  SKIN: + jaundice all over skin, warm and dry; no rash  NERVOUS SYSTEM:  Awake and alert; Oriented  to place, person and time ; no new deficits    _________________________________________________  LABS:                        13.2   11.37 )-----------( 84       ( 13 Feb 2023 06:03 )             38.2     02-13    147<H>  |  111<H>  |  29<H>  ----------------------------<  235<H>  3.5   |  26  |  0.99    Ca    6.0<LL>      13 Feb 2023 06:03  Phos  1.7     02-13  Mg     1.4     02-13    TPro  5.0<L>  /  Alb  1.7<L>  /  TBili  8.2<H>  /  DBili  6.6<H>  /  AST  48<H>  /  ALT  88<H>  /  AlkPhos  139<H>  02-13    PT/INR - ( 13 Feb 2023 06:03 )   PT: 13.4 sec;   INR: 1.12 ratio             CAPILLARY BLOOD GLUCOSE      POCT Blood Glucose.: 194 mg/dL (13 Feb 2023 11:32)  POCT Blood Glucose.: 217 mg/dL (13 Feb 2023 08:15)  POCT Blood Glucose.: 249 mg/dL (12 Feb 2023 21:05)  POCT Blood Glucose.: 409 mg/dL (12 Feb 2023 16:49)        RADIOLOGY & ADDITIONAL TESTS:  < from: CT Abdomen and Pelvis No Cont (02.10.23 @ 05:54) >  MPRESSION:  1. No bowel obstruction or inflammation.  2. Tiny cystic lesion in the body of the pancreas. A follow-up   contrast-enhanced MR pancreas protocol is recommended in 2 years.  3. Branching "tree in bud" opacities in the right middle lobe likely   infectious in etiology. Dependent airspace opacities in bilateral lower   lobes which could be due to atelectasis versus an infectious etiology in   the correct clinical setting.        < end of copied text >  < from: Xray Chest 1 View- PORTABLE-Urgent (Xray Chest 1 View- PORTABLE-Urgent .) (02.10.23 @ 00:48) >    IMPRESSION: Negative chest.    < end of copied text >    Imaging Personally Reviewed:  YES    Consultant(s) Notes Reviewed:   YES    Care Discussed with Consultants :     Plan of care was discussed with patient and /or primary care giver; all questions and concerns were addressed and care was aligned with patient's wishes.

## 2023-02-13 NOTE — PROGRESS NOTE ADULT - SUBJECTIVE AND OBJECTIVE BOX
[facility] NEPHROLOGY- PROGRESS NOTE    67 year old Mandarin speaking male with CAD s/p CABG, DM, HLD a/w jaundice, HHS, and KYLE. Nephrology consulted for Elevated serum creatinine.    Hospital Medications: Medications reviewed.  REVIEW OF SYSTEMS:  CONSTITUTIONAL: No fevers or chills  RESPIRATORY: No shortness of breath  CARDIOVASCULAR: No chest pain.  GASTROINTESTINAL: No nausea, vomiting, or diarrhea + abdominal pain.   VASCULAR: No bilateral lower extremity edema.     VITALS:  T(F): 98.4 (23 @ 12:48), Max: 98.4 (23 @ 12:48)  HR: 68 (23 @ 12:48)  BP: 133/73 (23 @ 12:48)  RR: 17 (23 @ :48)  SpO2: 95% (23 @ 12:48)  Wt(kg): --  Height (cm): 172.7 ( @ 22:42)  Weight (kg): 59.3 (02-10 @ 06:18)  BMI (kg/m2): 19.9 (02-10 @ 06:18)  BSA (m2): 1.71 (02-10 @ 06:18)     @ 07:01  -   @ 07:00  --------------------------------------------------------  IN: 0 mL / OUT: 300 mL / NET: -300 mL      PHYSICAL EXAM:  Constitutional: NAD  HEENT: icteric sclera,   Respiratory: CTA b/l  Cardiovascular: S1, S2, RRR  Gastrointestinal: BS+, soft, Mild martina-umbilical tenderness  : No mendoza.  Extremities: No peripheral edema  Derm: +jaundice    LABS:    147 <--, 147 <--, 150 <--, 153 <--, 154 <--, 159 <--, 161 <--  147<H>  |  111<H>  |  29<H>  ----------------------------<  235<H>  3.5   |  26  |  0.99    Ca    6.0<LL>      2023 06:03  Phos  1.7       Mg     1.4         TPro  5.0<L>  /  Alb  1.7<L>  /  TBili  8.2<H>  /  DBili  6.6<H>  /  AST  48<H>  /  ALT  88<H>  /  AlkPhos  139<H>      Creatinine Trend: 0.99 <--, 1.36 <--, 1.32 <--, 1.49 <--, 1.71 <--, 1.69 <--, 1.76 <--, 1.95 <--, 2.09 <--, 2.25 <--, 2.42 <--, 3.12 <--, 2.90 <--, 3.84 <--                        13.2   11.37 )-----------( 84       ( 2023 06:03 )             38.2     Urine Studies:  Urinalysis Basic - ( 10 Feb 2023 02:35 )    Color: Yellow / Appearance: Clear / S.010 / pH:   Gluc:  / Ketone: Trace  / Bili: Small / Urobili: 1   Blood:  / Protein: 15 / Nitrite: Negative   Leuk Esterase: Negative / RBC: 2-5 /HPF / WBC 0-2 /HPF   Sq Epi:  / Non Sq Epi: Occasional /HPF / Bacteria: Trace /HPF        RADIOLOGY & ADDITIONAL STUDIES:   [facility] NEPHROLOGY- PROGRESS NOTE    67 year old Mandarin speaking male with CAD s/p CABG, DM, HLD a/w jaundice, HHS, and KYLE. Nephrology consulted for Elevated serum creatinine.    Hospital Medications: Medications reviewed.  REVIEW OF SYSTEMS: Mandarin  # [id number]  CONSTITUTIONAL: No fevers or chills  RESPIRATORY: No shortness of breath  CARDIOVASCULAR: No chest pain.  GASTROINTESTINAL: No nausea, vomiting, or diarrhea + abdominal pain.   VASCULAR: No bilateral lower extremity edema.     VITALS:  T(F): 98.4 (23 @ 12:48), Max: 98.4 (23 @ 12:48)  HR: 68 (23 @ 12:48)  BP: 133/73 (23 @ 12:48)  RR: 17 (23 @ 12:48)  SpO2: 95% (23 @ 12:48)  Wt(kg): --  Height (cm): 172.7 ( @ 22:42)  Weight (kg): 59.3 (02-10 @ 06:18)  BMI (kg/m2): 19.9 (02-10 @ 06:18)  BSA (m2): 1.71 (02-10 @ 06:18)     @ 07:01  -   @ 07:00  --------------------------------------------------------  IN: 0 mL / OUT: 300 mL / NET: -300 mL      PHYSICAL EXAM:  Constitutional: NAD  HEENT: icteric sclera,   Respiratory: CTA b/l  Cardiovascular: S1, S2, RRR  Gastrointestinal: BS+, soft, Mild martina-umbilical tenderness  : No mendoza.  Extremities: No peripheral edema  Derm: +jaundice    LABS:    147 <--, 147 <--, 150 <--, 153 <--, 154 <--, 159 <--, 161 <--  147<H>  |  111<H>  |  29<H>  ----------------------------<  235<H>  3.5   |  26  |  0.99    Ca    6.0<LL>      2023 06:03  Phos  1.7       Mg     1.4         TPro  5.0<L>  /  Alb  1.7<L>  /  TBili  8.2<H>  /  DBili  6.6<H>  /  AST  48<H>  /  ALT  88<H>  /  AlkPhos  139<H>      Creatinine Trend: 0.99 <--, 1.36 <--, 1.32 <--, 1.49 <--, 1.71 <--, 1.69 <--, 1.76 <--, 1.95 <--, 2.09 <--, 2.25 <--, 2.42 <--, 3.12 <--, 2.90 <--, 3.84 <--                        13.2   11.37 )-----------( 84       ( 2023 06:03 )             38.2     Urine Studies:  Urinalysis Basic - ( 10 Feb 2023 02:35 )    Color: Yellow / Appearance: Clear / S.010 / pH:   Gluc:  / Ketone: Trace  / Bili: Small / Urobili: 1   Blood:  / Protein: 15 / Nitrite: Negative   Leuk Esterase: Negative / RBC: 2-5 /HPF / WBC 0-2 /HPF   Sq Epi:  / Non Sq Epi: Occasional /HPF / Bacteria: Trace /HPF        RADIOLOGY & ADDITIONAL STUDIES:

## 2023-02-13 NOTE — PROGRESS NOTE ADULT - ASSESSMENT
Patient is a 68 yo Male, from home, with PMH CAD s/p CABG, DM, HLD presenting with jaundice and lethargy. Patient is lethargic and unable to provide much history. As per ED provider, "his symptoms started about 5 days ago initially with flu-like symptoms and vomiting. went to PMD and was given a nausea medication and felt improved but now feeling worse again. Daughter states jaundice is also getting worse. Has lost about 10-15lbs in the past week."   Patient was noted to have blood glucose > 1000 with severe hypokalemia, KYLE, and elevated bilirubin levels. He was admitted to the ICU for HHS management. He received 6L IVF boluses and potassium supplement of  40 meq x4 and placed on IVF with to increase potassium from 2.6 to more than 3.5 for insulin drip. Patient's blood sugar levels were trended down therefore he was placed on an insulin sliding scale every hour instead of a drip, he received a total of 28 units and was transitions to clear liquid diet with basal Lantus of 10 units. Patient was also found to have elevated bilirubin of 14.5, CT abdomen and pelvis was performed, no obstructing lesions found, there was a small 3mm pancreatic cyst and tree in bud opacities in right middle lobe. Empiric ceftriaxone was started. GI Dr. Stroud consulted, MRCP ordered. Labs Hep a/b/c, anti-smooth muscle antibody anti-mitochondrial antibody LDH, IgG subset, CEA, , AFP, MARA, ceruloplasmin, alpha 1 antitrypsin, iron panel ordered. Patient also had thrombocytopenia, no signs of bleeding, normal morphology with no schistocytes. D5w was started for hypernatremia. Lion was discontinued, urine culture was negative. Patient is was downgraded to medicine floors on 2/11  Patient is examined this AM, still having jaundice. Denies abdominal pain. Scheduled for MRCP today

## 2023-02-13 NOTE — PROGRESS NOTE ADULT - SUBJECTIVE AND OBJECTIVE BOX
Patient is a 67y old  Male who presents with a chief complaint of hyperosmolar hyperglycemic state (12 Feb 2023 15:32)    PATIENT IS SEEN AND EXAMINED IN MEDICAL FLOOR.  CHUCK [    ]    SHERMAN [   ]      GT [   ]    ALLERGIES:  No Known Allergies      Daily     Daily     VITALS:    Vital Signs Last 24 Hrs  T(C): 36.6 (13 Feb 2023 05:34), Max: 36.7 (12 Feb 2023 13:25)  T(F): 97.8 (13 Feb 2023 05:34), Max: 98.1 (12 Feb 2023 20:22)  HR: 69 (13 Feb 2023 05:34) (69 - 74)  BP: 130/74 (13 Feb 2023 05:34) (129/72 - 130/74)  BP(mean): --  RR: 18 (13 Feb 2023 05:34) (16 - 18)  SpO2: 98% (13 Feb 2023 05:34) (96% - 98%)    Parameters below as of 13 Feb 2023 05:34  Patient On (Oxygen Delivery Method): room air        LABS:    CBC Full  -  ( 13 Feb 2023 06:03 )  WBC Count : 11.37 K/uL  RBC Count : 4.14 M/uL  Hemoglobin : 13.2 g/dL  Hematocrit : 38.2 %  Platelet Count - Automated : 84 K/uL  Mean Cell Volume : 92.3 fl  Mean Cell Hemoglobin : 31.9 pg  Mean Cell Hemoglobin Concentration : 34.6 gm/dL  Auto Neutrophil # : 9.15 K/uL  Auto Lymphocyte # : 1.16 K/uL  Auto Monocyte # : 0.49 K/uL  Auto Eosinophil # : 0.05 K/uL  Auto Basophil # : 0.03 K/uL  Auto Neutrophil % : 80.5 %  Auto Lymphocyte % : 10.2 %  Auto Monocyte % : 4.3 %  Auto Eosinophil % : 0.4 %  Auto Basophil % : 0.3 %    PT/INR - ( 13 Feb 2023 06:03 )   PT: 13.4 sec;   INR: 1.12 ratio           02-13    147<H>  |  111<H>  |  29<H>  ----------------------------<  235<H>  3.5   |  26  |  0.99    Ca    6.0<LL>      13 Feb 2023 06:03  Phos  1.7     02-13  Mg     1.4     02-13    TPro  5.0<L>  /  Alb  1.7<L>  /  TBili  8.2<H>  /  DBili  6.6<H>  /  AST  48<H>  /  ALT  88<H>  /  AlkPhos  139<H>  02-13    CAPILLARY BLOOD GLUCOSE      POCT Blood Glucose.: 217 mg/dL (13 Feb 2023 08:15)  POCT Blood Glucose.: 249 mg/dL (12 Feb 2023 21:05)  POCT Blood Glucose.: 409 mg/dL (12 Feb 2023 16:49)  POCT Blood Glucose.: 299 mg/dL (12 Feb 2023 11:31)        LIVER FUNCTIONS - ( 13 Feb 2023 06:03 )  Alb: 1.7 g/dL / Pro: 5.0 g/dL / ALK PHOS: 139 U/L / ALT: 88 U/L DA / AST: 48 U/L / GGT: x           Creatinine Trend: 0.99<--, 1.36<--, 1.32<--, 1.49<--, 1.71<--, 1.69<--  I&O's Summary    12 Feb 2023 07:01  -  13 Feb 2023 07:00  --------------------------------------------------------  IN: 0 mL / OUT: 300 mL / NET: -300 mL            Clean Catch Clean Catch (Midstream)  02-10 @ 02:35   <10,000 CFU/mL Normal Urogenital Nerissa  --  --          MEDICATIONS:    MEDICATIONS  (STANDING):  allopurinol 100 milliGRAM(s) Oral daily  cefTRIAXone   IVPB      cefTRIAXone   IVPB 1000 milliGRAM(s) IV Intermittent every 24 hours  insulin glargine Injectable (LANTUS) 10 Unit(s) SubCutaneous at bedtime  insulin lispro (ADMELOG) corrective regimen sliding scale   SubCutaneous three times a day before meals  insulin lispro Injectable (ADMELOG) 5 Unit(s) SubCutaneous three times a day before meals  NIFEdipine XL 30 milliGRAM(s) Oral daily  ondansetron Injectable 4 milliGRAM(s) IV Push once  sodium chloride 0.9% with potassium chloride 20 mEq/L 1000 milliLiter(s) (100 mL/Hr) IV Continuous <Continuous>      MEDICATIONS  (PRN):      REVIEW OF SYSTEMS:                           ALL ROS DONE [ X   ]    CONSTITUTIONAL:  LETHARGIC [   ], FEVER [   ], UNRESPONSIVE [   ]  CVS:  CP  [   ], SOB, [   ], PALPITATIONS [   ], DIZZYNESS [   ]  RS: COUGH [   ], SPUTUM [   ]  GI: ABDOMINAL PAIN [   ], NAUSEA [   ], VOMITINGS [   ], DIARRHEA [   ], CONSTIPATION [   ]  :  DYSURIA [   ], NOCTURIA [   ], INCREASED FREQUENCY [   ], DRIBLING [   ],  SKELETAL: PAINFUL JOINTS [   ], SWOLLEN JOINTS [   ], NECK ACHE [   ], LOW BACK ACHE [   ],  SKIN : ULCERS [   ], RASH [   ], ITCHING [   ]  CNS: HEAD ACHE [   ], DOUBLE VISION [   ], BLURRED VISION [   ], AMS / CONFUSION [   ], SEIZURES [   ], WEAKNESS [   ],TINGLING / NUMBNESS [   ]      PHYSICAL EXAMINATION:  GENERAL APPEARANCE: NO DISTRESS  HEENT:  NO PALLOR, NO  JVD,  NO   NODES, NECK SUPPLE  CVS: S1 +, S2 +,   RS: AEEB,  OCCASIONAL  RALES +,   NO RONCHI  ABD: SOFT, NT, NO, BS +  EXT: NO PE  SKIN: WARM,  JAUNDICE  SKELETAL:  ROM ACCEPTABLE  CNS:  AAO X 2-3    RADIOLOGY :    RADIOLOGY AND READINGS REVIEWED    ASSESSMENT :       PLAN:  HPI:  67 year old male PMH CAD s/p CABG, DM, HLD coming in with jaundice and generalized body aches. Patient is lethargic and unable to provide much history. As per ED provider, " his symptoms started about 5 days ago initially with flu-like symptoms and vomiting. went to PMD and was given a nausea medication and felt improved but now feeling worse again. daughter states jaundice is also getting worse. has lost about 10-15lbs in the past week."     (10 Feb 2023 02:52)    # CASE D/W PATIENT'S DAUGHTER FINESSE OR[place]O @ [phone], ALL QUESTIONS ANSWERED    # HHS - RESOLVED  # DM  - ON LANTUS, SSI + FS  - F/U HBA1C    # SEPSIS S/T SUSPECTED PNEUMONIA  - PLACED ON ROCEPHIN, F/U BCX    # KYLE  # HYPERNATREMIA  - ON IVF  - STRICT IS AN OS  - MONITOR CR  - AVOID NEPHROTOXIC AGENTS  - NEPHROLOGY CONSULT    # HYPOKALEMIA, HYPOMAGNESEMIA  - REPLETING WITH SUPPLEMENT    # JAUNDICE, HYPERBILIRUBINEMIA   - NOTED CT A/P  - F/U RUQ US  - F/U MRCP  - F/U HEPATITIS PANEL, AUTOIMMUNE WORKUP, TUMOR MARKERS, IRON PANEL  - GI CONSULT IN PROGRESS    # CAD s/p CABG  # HTN  - ON PLAVIX  - ON NIFEDIPINE    # ? A.FIB  - ON XARELTO, HOLD  - DAUGHTER UNSURE, WILL OBTAIN OUTPATIENT RECORDS IF POSSIBLE    # HLD - ON STATIN, HOLD    # GI PPX   Patient is a 67y old  Male who presents with a chief complaint of hyperosmolar hyperglycemic state (12 Feb 2023 15:32)    PATIENT IS SEEN AND EXAMINED IN MEDICAL FLOOR. PATIENT DENIES ABDOMINAL PAIN/N/V.    ALLERGIES:  No Known Allergies    VITALS:    Vital Signs Last 24 Hrs  T(C): 36.6 (13 Feb 2023 05:34), Max: 36.7 (12 Feb 2023 13:25)  T(F): 97.8 (13 Feb 2023 05:34), Max: 98.1 (12 Feb 2023 20:22)  HR: 69 (13 Feb 2023 05:34) (69 - 74)  BP: 130/74 (13 Feb 2023 05:34) (129/72 - 130/74)  BP(mean): --  RR: 18 (13 Feb 2023 05:34) (16 - 18)  SpO2: 98% (13 Feb 2023 05:34) (96% - 98%)    Parameters below as of 13 Feb 2023 05:34  Patient On (Oxygen Delivery Method): room air        LABS:    CBC Full  -  ( 13 Feb 2023 06:03 )  WBC Count : 11.37 K/uL  RBC Count : 4.14 M/uL  Hemoglobin : 13.2 g/dL  Hematocrit : 38.2 %  Platelet Count - Automated : 84 K/uL  Mean Cell Volume : 92.3 fl  Mean Cell Hemoglobin : 31.9 pg  Mean Cell Hemoglobin Concentration : 34.6 gm/dL  Auto Neutrophil # : 9.15 K/uL  Auto Lymphocyte # : 1.16 K/uL  Auto Monocyte # : 0.49 K/uL  Auto Eosinophil # : 0.05 K/uL  Auto Basophil # : 0.03 K/uL  Auto Neutrophil % : 80.5 %  Auto Lymphocyte % : 10.2 %  Auto Monocyte % : 4.3 %  Auto Eosinophil % : 0.4 %  Auto Basophil % : 0.3 %    PT/INR - ( 13 Feb 2023 06:03 )   PT: 13.4 sec;   INR: 1.12 ratio           02-13    147<H>  |  111<H>  |  29<H>  ----------------------------<  235<H>  3.5   |  26  |  0.99    Ca    6.0<LL>      13 Feb 2023 06:03  Phos  1.7     02-13  Mg     1.4     02-13    TPro  5.0<L>  /  Alb  1.7<L>  /  TBili  8.2<H>  /  DBili  6.6<H>  /  AST  48<H>  /  ALT  88<H>  /  AlkPhos  139<H>  02-13    CAPILLARY BLOOD GLUCOSE      POCT Blood Glucose.: 217 mg/dL (13 Feb 2023 08:15)  POCT Blood Glucose.: 249 mg/dL (12 Feb 2023 21:05)  POCT Blood Glucose.: 409 mg/dL (12 Feb 2023 16:49)  POCT Blood Glucose.: 299 mg/dL (12 Feb 2023 11:31)        LIVER FUNCTIONS - ( 13 Feb 2023 06:03 )  Alb: 1.7 g/dL / Pro: 5.0 g/dL / ALK PHOS: 139 U/L / ALT: 88 U/L DA / AST: 48 U/L / GGT: x           Creatinine Trend: 0.99<--, 1.36<--, 1.32<--, 1.49<--, 1.71<--, 1.69<--  I&O's Summary    12 Feb 2023 07:01  -  13 Feb 2023 07:00  --------------------------------------------------------  IN: 0 mL / OUT: 300 mL / NET: -300 mL            Clean Catch Clean Catch (Midstream)  02-10 @ 02:35   <10,000 CFU/mL Normal Urogenital Nerissa  --  --          MEDICATIONS:    MEDICATIONS  (STANDING):  allopurinol 100 milliGRAM(s) Oral daily  cefTRIAXone   IVPB      cefTRIAXone   IVPB 1000 milliGRAM(s) IV Intermittent every 24 hours  insulin glargine Injectable (LANTUS) 10 Unit(s) SubCutaneous at bedtime  insulin lispro (ADMELOG) corrective regimen sliding scale   SubCutaneous three times a day before meals  insulin lispro Injectable (ADMELOG) 5 Unit(s) SubCutaneous three times a day before meals  NIFEdipine XL 30 milliGRAM(s) Oral daily  ondansetron Injectable 4 milliGRAM(s) IV Push once  sodium chloride 0.9% with potassium chloride 20 mEq/L 1000 milliLiter(s) (100 mL/Hr) IV Continuous <Continuous>      MEDICATIONS  (PRN):      REVIEW OF SYSTEMS:                           ALL ROS DONE [ X   ]    CONSTITUTIONAL:  LETHARGIC [   ], FEVER [   ], UNRESPONSIVE [   ]  CVS:  CP  [   ], SOB, [   ], PALPITATIONS [   ], DIZZYNESS [   ]  RS: COUGH [   ], SPUTUM [   ]  GI: ABDOMINAL PAIN [   ], NAUSEA [   ], VOMITINGS [   ], DIARRHEA [   ], CONSTIPATION [   ]  :  DYSURIA [   ], NOCTURIA [   ], INCREASED FREQUENCY [   ], DRIBLING [   ],  SKELETAL: PAINFUL JOINTS [   ], SWOLLEN JOINTS [   ], NECK ACHE [   ], LOW BACK ACHE [   ],  SKIN : ULCERS [   ], RASH [   ], ITCHING [   ]  CNS: HEAD ACHE [   ], DOUBLE VISION [   ], BLURRED VISION [   ], AMS / CONFUSION [   ], SEIZURES [   ], WEAKNESS [   ],TINGLING / NUMBNESS [   ]      PHYSICAL EXAMINATION:  GENERAL APPEARANCE: NO DISTRESS  HEENT:  NO PALLOR, NO  JVD,  NO   NODES, NECK SUPPLE  CVS: S1 +, S2 +,   RS: AEEB,  OCCASIONAL  RALES +,   NO RONCHI  ABD: SOFT, NT, NO, BS +  EXT: NO PE  SKIN: WARM,  JAUNDICE  SKELETAL:  ROM ACCEPTABLE  CNS:  AAO X 2-3    RADIOLOGY :    RADIOLOGY AND READINGS REVIEWED    ASSESSMENT :       PLAN:  HPI:  67 year old male PMH CAD s/p CABG, DM, HLD coming in with jaundice and generalized body aches. Patient is lethargic and unable to provide much history. As per ED provider, " his symptoms started about 5 days ago initially with flu-like symptoms and vomiting. went to PMD and was given a nausea medication and felt improved but now feeling worse again. daughter states jaundice is also getting worse. has lost about 10-15lbs in the past week."     (10 Feb 2023 02:52)    # CASE D/W PATIENT'S DAUGHTER FINESSE [place] @ [phone], ALL QUESTIONS ANSWERED [2/12]    # HHS - RESOLVED  # DM  - ON LANTUS, TID INSULIN, SSI + FS  - HBA1C - 9.1  - ENDOCRINOLOGY CONSULT PLACED    # SEPSIS S/T SUSPECTED PNEUMONIA  - PLACED ON ROCEPHIN, F/U BCX    # KYLE  # HYPERNATREMIA  - ON IVF  - STRICT IS AN OS  - MONITOR CR  - AVOID NEPHROTOXIC AGENTS  - NEPHROLOGY CONSULT    # HYPOKALEMIA, HYPOMAGNESEMIA  - REPLETING WITH SUPPLEMENT    # JAUNDICE, HYPERBILIRUBINEMIA   - NOTED CT A/P  - F/U RUQ US  - F/U MRCP  - F/U HEPATITIS PANEL, AUTOIMMUNE WORKUP, TUMOR MARKERS, IRON PANEL  - GI CONSULT IN PROGRESS    # CAD s/p CABG  # HTN  - ON PLAVIX  - ON NIFEDIPINE    # ? A.FIB  - ON XARELTO, HOLD  - DAUGHTER UNSURE, WILL OBTAIN OUTPATIENT RECORDS IF POSSIBLE    # HLD - ON STATIN, HOLD    # GI PPX

## 2023-02-14 LAB
24R-OH-CALCIDIOL SERPL-MCNC: 16.5 NG/ML — LOW (ref 30–80)
ALBUMIN SERPL ELPH-MCNC: 1.8 G/DL — LOW (ref 3.5–5)
ALP SERPL-CCNC: 150 U/L — HIGH (ref 40–120)
ALT FLD-CCNC: 90 U/L DA — HIGH (ref 10–60)
ANION GAP SERPL CALC-SCNC: 10 MMOL/L — SIGNIFICANT CHANGE UP (ref 5–17)
AST SERPL-CCNC: 74 U/L — HIGH (ref 10–40)
BILIRUB SERPL-MCNC: 7.7 MG/DL — HIGH (ref 0.2–1.2)
BUN SERPL-MCNC: 23 MG/DL — HIGH (ref 7–18)
CALCIUM SERPL-MCNC: 5.7 MG/DL — CRITICAL LOW (ref 8.4–10.5)
CALCIUM SERPL-MCNC: 6 MG/DL — CRITICAL LOW (ref 8.4–10.5)
CHLORIDE SERPL-SCNC: 106 MMOL/L — SIGNIFICANT CHANGE UP (ref 96–108)
CO2 SERPL-SCNC: 26 MMOL/L — SIGNIFICANT CHANGE UP (ref 22–31)
CREAT SERPL-MCNC: 0.73 MG/DL — SIGNIFICANT CHANGE UP (ref 0.5–1.3)
EGFR: 99 ML/MIN/1.73M2 — SIGNIFICANT CHANGE UP
GLUCOSE BLDC GLUCOMTR-MCNC: 124 MG/DL — HIGH (ref 70–99)
GLUCOSE BLDC GLUCOMTR-MCNC: 133 MG/DL — HIGH (ref 70–99)
GLUCOSE BLDC GLUCOMTR-MCNC: 139 MG/DL — HIGH (ref 70–99)
GLUCOSE BLDC GLUCOMTR-MCNC: 143 MG/DL — HIGH (ref 70–99)
GLUCOSE SERPL-MCNC: 142 MG/DL — HIGH (ref 70–99)
HCT VFR BLD CALC: 39.6 % — SIGNIFICANT CHANGE UP (ref 39–50)
HGB BLD-MCNC: 13.4 G/DL — SIGNIFICANT CHANGE UP (ref 13–17)
MAGNESIUM SERPL-MCNC: 1.3 MG/DL — LOW (ref 1.6–2.6)
MCHC RBC-ENTMCNC: 31.2 PG — SIGNIFICANT CHANGE UP (ref 27–34)
MCHC RBC-ENTMCNC: 33.8 GM/DL — SIGNIFICANT CHANGE UP (ref 32–36)
MCV RBC AUTO: 92.3 FL — SIGNIFICANT CHANGE UP (ref 80–100)
NRBC # BLD: 0 /100 WBCS — SIGNIFICANT CHANGE UP (ref 0–0)
PHOSPHATE SERPL-MCNC: 2.1 MG/DL — LOW (ref 2.5–4.5)
PLATELET # BLD AUTO: 97 K/UL — LOW (ref 150–400)
POTASSIUM SERPL-MCNC: 3.2 MMOL/L — LOW (ref 3.5–5.3)
POTASSIUM SERPL-SCNC: 3.2 MMOL/L — LOW (ref 3.5–5.3)
PROT SERPL-MCNC: 5 G/DL — LOW (ref 6–8.3)
PTH-INTACT FLD-MCNC: 194 PG/ML — HIGH (ref 15–65)
RBC # BLD: 4.29 M/UL — SIGNIFICANT CHANGE UP (ref 4.2–5.8)
RBC # FLD: 13.1 % — SIGNIFICANT CHANGE UP (ref 10.3–14.5)
SODIUM SERPL-SCNC: 142 MMOL/L — SIGNIFICANT CHANGE UP (ref 135–145)
WBC # BLD: 10.78 K/UL — HIGH (ref 3.8–10.5)
WBC # FLD AUTO: 10.78 K/UL — HIGH (ref 3.8–10.5)

## 2023-02-14 PROCEDURE — 99232 SBSQ HOSP IP/OBS MODERATE 35: CPT

## 2023-02-14 PROCEDURE — 74183 MRI ABD W/O CNTR FLWD CNTR: CPT | Mod: 26

## 2023-02-14 RX ORDER — POTASSIUM PHOSPHATE, MONOBASIC POTASSIUM PHOSPHATE, DIBASIC 236; 224 MG/ML; MG/ML
15 INJECTION, SOLUTION INTRAVENOUS ONCE
Refills: 0 | Status: COMPLETED | OUTPATIENT
Start: 2023-02-14 | End: 2023-02-14

## 2023-02-14 RX ORDER — MAGNESIUM SULFATE 500 MG/ML
2 VIAL (ML) INJECTION ONCE
Refills: 0 | Status: COMPLETED | OUTPATIENT
Start: 2023-02-14 | End: 2023-02-14

## 2023-02-14 RX ORDER — CHOLECALCIFEROL (VITAMIN D3) 125 MCG
1000 CAPSULE ORAL DAILY
Refills: 0 | Status: DISCONTINUED | OUTPATIENT
Start: 2023-02-14 | End: 2023-02-18

## 2023-02-14 RX ORDER — CALCITRIOL 0.5 UG/1
0.25 CAPSULE ORAL DAILY
Refills: 0 | Status: DISCONTINUED | OUTPATIENT
Start: 2023-02-14 | End: 2023-02-15

## 2023-02-14 RX ORDER — CALCIUM CARBONATE 500(1250)
1 TABLET ORAL DAILY
Refills: 0 | Status: DISCONTINUED | OUTPATIENT
Start: 2023-02-14 | End: 2023-02-15

## 2023-02-14 RX ORDER — POTASSIUM CHLORIDE 20 MEQ
40 PACKET (EA) ORAL ONCE
Refills: 0 | Status: COMPLETED | OUTPATIENT
Start: 2023-02-14 | End: 2023-02-14

## 2023-02-14 RX ADMIN — Medication 40 MILLIEQUIVALENT(S): at 08:29

## 2023-02-14 RX ADMIN — DEXTROSE MONOHYDRATE, SODIUM CHLORIDE, AND POTASSIUM CHLORIDE 80 MILLILITER(S): 50; .745; 4.5 INJECTION, SOLUTION INTRAVENOUS at 22:36

## 2023-02-14 RX ADMIN — ONDANSETRON 4 MILLIGRAM(S): 8 TABLET, FILM COATED ORAL at 12:49

## 2023-02-14 RX ADMIN — Medication 1 TABLET(S): at 12:30

## 2023-02-14 RX ADMIN — Medication 1000 UNIT(S): at 12:30

## 2023-02-14 RX ADMIN — Medication 25 GRAM(S): at 08:28

## 2023-02-14 RX ADMIN — INSULIN GLARGINE 10 UNIT(S): 100 INJECTION, SOLUTION SUBCUTANEOUS at 22:02

## 2023-02-14 RX ADMIN — Medication 100 MILLIGRAM(S): at 12:31

## 2023-02-14 RX ADMIN — Medication 8 UNIT(S): at 17:03

## 2023-02-14 RX ADMIN — CEFTRIAXONE 100 MILLIGRAM(S): 500 INJECTION, POWDER, FOR SOLUTION INTRAMUSCULAR; INTRAVENOUS at 17:04

## 2023-02-14 RX ADMIN — POTASSIUM PHOSPHATE, MONOBASIC POTASSIUM PHOSPHATE, DIBASIC 62.5 MILLIMOLE(S): 236; 224 INJECTION, SOLUTION INTRAVENOUS at 08:28

## 2023-02-14 RX ADMIN — Medication 30 MILLIGRAM(S): at 06:11

## 2023-02-14 RX ADMIN — Medication 8 UNIT(S): at 12:34

## 2023-02-14 NOTE — PROGRESS NOTE ADULT - SUBJECTIVE AND OBJECTIVE BOX
[facility] NEPHROLOGY- PROGRESS NOTE    67 year old Mandarin speaking male with CAD s/p CABG, DM, HLD a/w jaundice, HHS, and KYLE. Nephrology consulted for Elevated serum creatinine.    Hospital Medications: Medications reviewed.  REVIEW OF SYSTEMS: Mandarin  # [id number]  CONSTITUTIONAL: No fevers or chills  RESPIRATORY: No shortness of breath  CARDIOVASCULAR: No chest pain.  GASTROINTESTINAL: No nausea, vomiting, or diarrhea + abdominal pain.   VASCULAR: No bilateral lower extremity edema.     VITALS:  T(F): 97.8 (23 @ 05:25), Max: 98.4 (23 @ 12:48)  HR: 73 (23 @ 05:25)  BP: 145/76 (23 @ 05:25)  RR: 18 (23 @ 05:25)  SpO2: 94% (23 @ 05:25)  Wt(kg): --      PHYSICAL EXAM:  Constitutional: NAD  HEENT: icteric sclera,   Respiratory: CTA b/l  Cardiovascular: S1, S2, RRR  Gastrointestinal: BS+, soft, Mild epigastric tenderness  : No mendoza.  Extremities: No peripheral edema  Derm: +jaundice    LABS:      142  |  106  |  23<H>  ----------------------------<  142<H>  3.2<L>   |  26  |  0.73    Ca    6.0<LL>      2023 06:01  Phos  2.1       Mg     1.3         TPro  5.0<L>  /  Alb  1.8<L>  /  TBili  7.7<H>  /  DBili      /  AST  74<H>  /  ALT  90<H>  /  AlkPhos  150<H>      Creatinine Trend: 0.73 <--, 0.99 <--, 1.36 <--, 1.32 <--, 1.49 <--, 1.71 <--, 1.69 <--, 1.76 <--, 1.95 <--, 2.09 <--, 2.25 <--, 2.42 <--, 3.12 <--, 2.90 <--, 3.84 <--                        13.4   10.78 )-----------( 97       ( 2023 06:01 )             39.6     Urine Studies:  Urinalysis Basic - ( 10 Feb 2023 02:35 )    Color: Yellow / Appearance: Clear / S.010 / pH:   Gluc:  / Ketone: Trace  / Bili: Small / Urobili: 1   Blood:  / Protein: 15 / Nitrite: Negative   Leuk Esterase: Negative / RBC: 2-5 /HPF / WBC 0-2 /HPF   Sq Epi:  / Non Sq Epi: Occasional /HPF / Bacteria: Trace /HPF

## 2023-02-14 NOTE — PROGRESS NOTE ADULT - ASSESSMENT
67 year old Mandarin speaking male with CAD s/p CABG, DM, HLD a/w jaundice, HHS, and KYLE. Nephrology consulted for Elevated serum creatinine.    1. KYLE hemodynamically-mediated in the setting of HHS, dehydration with n/v, KYLE resolved with IVF. Strict I/Os. Avoid nephrotoxins/ NSAIDs/ RCA. Monitor BMP.  2. Hypernatremia- due to dehydration in the setting of N/V, poor po intake, HHS.   Initial Na+ when corrected for severe hyperglycemia is 163.  Hypernatremia improving c/w 1/2NS with 20 meq KCL @ 80 ml/hr.   Monitor serum Na.   3. Hypokalemia- Pt given KCl PO. c/w IVF with potassium. Monitor serum K+  4. Hypoalbuminemia- most likely due to malnutrition.  Likely longer period of time over which pt has had weight loss and poor oral intake.  ?underlying malignancy? Check urine protein/creatine ratio to r/o proteinuria.  5. Hypercalcemia- due to dehydration. Now with hypocalcemia; Ca 7.76.  25 OH vit D 16.5, PTHi 194- Will give Vitamin D 1000 units PO daily. Will start calcitriol 0.25mcg PO daily. Monitor ionized Ca.   6. Hypophosphatemia- pt given Kphos IVx1. HypoMg- pt given Mg 1g Iv x2 already. Monitor lytes.    [facility] NEPHROLOGY  Devyn Palmer M.D.  Buster York D.O.  Padmini Jose M.D.  Allison Phillip, MSN, ANP-C    Telephone: [phone]  Facsimile: [phone] [place] [address], [id number]  [place] [address]

## 2023-02-14 NOTE — PROGRESS NOTE ADULT - ASSESSMENT
Patient is a 66 yo Male, from home, with PMH CAD s/p CABG, DM, HLD presenting with jaundice and lethargy. Patient is lethargic and unable to provide much history. As per ED provider, "his symptoms started about 5 days ago initially with flu-like symptoms and vomiting. went to PMD and was given a nausea medication and felt improved but now feeling worse again. Daughter states jaundice is also getting worse. Has lost about 10-15lbs in the past week."   Patient was noted to have blood glucose > 1000 with severe hypokalemia, KYLE, and elevated bilirubin levels. He was admitted to the ICU for HHS management. He received 6L IVF boluses and potassium supplement of  40 meq x4 and placed on IVF with to increase potassium from 2.6 to more than 3.5 for insulin drip. Patient's blood sugar levels were trended down therefore he was placed on an insulin sliding scale every hour instead of a drip, he received a total of 28 units and was transitions to clear liquid diet with basal Lantus of 10 units. Patient was also found to have elevated bilirubin of 14.5, CT abdomen and pelvis was performed, no obstructing lesions found, there was a small 3mm pancreatic cyst and tree in bud opacities in right middle lobe. Empiric ceftriaxone was started. GI Dr. Stroud consulted, MRCP ordered. Labs Hep a/b/c, anti-smooth muscle antibody anti-mitochondrial antibody LDH, IgG subset, CEA, , AFP, MARA, ceruloplasmin, alpha 1 antitrypsin, iron panel ordered. Patient also had thrombocytopenia, no signs of bleeding, normal morphology with no schistocytes. D5w was started for hypernatremia. Lion was discontinued, urine culture was negative. Patient is was downgraded to medicine floors on 2/11  Patient is examined this AM, still having jaundice. Denies abdominal pain. Pt is s/p MRCP today showing GBD WNL with no gall stones.

## 2023-02-14 NOTE — PROGRESS NOTE ADULT - ASSESSMENT
This is a 67 year old male PMH CAD s/p CABG, DM, HLD coming in with jaundice and generalized body aches. GI consulted for painless jaundice. Patient is lethargic and unable to provide much history. As per daughter (Ligia), " his symptoms started about 5 days ago initially with flu-like symptoms and vomiting. Went to PMD and was given a nausea medication and felt improved but now feeling worse again. Daughter states jaundice is also getting worse. Has lost about 10-15lbs in the past week." As per daughter patient does not drink or smoke cigarettes Last travel to [place] 5+ years ago. Never had an EGD or a colonoscopy Patient takes Ginseng supplements. In short, patient presented with one week of lethargy and worsening painless jaundice. Admitted with [facility] into ICU on insulin gtt. CT A/P revealed 3 mm cystic lesion in the body of the pancreas. Labs significant for WBC 8 HH 15/46 PLT 55 INR 1 PT 12  CR/BUN 2.4/98 Tbili 10.6  ASt 27 ALT 68. Patient seen and examined at bedside. Patient denies any difficulty swallowing or reflux. No N&V or abdominal pain. No blood or dark tarry stools. Normal caliber Denies any Tylenol use. Denies any hepatitis exposure. No tattoos     #Painless Jaundice  #Thrombocytopenia  #Transaminitis  #lethargy    P/W painless jaundice. CT unremarkable. Noticed down trend of LFTs. Tbili 14.5-->10.6. Ddx obstructing lesion/stone or primary liver etiology i.e. viral, etc.     2/14: MRCP showed CBD wnl, periampullary duodenal diverticulum, no choledo, trace ascite,s new mild right pleural effusion.    	- Avoid NSAIDs  	- CEA 2.0, CA 19-9 was 23. AFP 2.0. Ceruloplasmin 23. LDH 341H  	- Acute hepatitis panel neg, MARA speckled, MARA 1:80  	- Ferritin 566H, Transferrin 162L, iron total 108, unsat iron 83L, TIBC 191L, %sat 56H  	- AsMA, AMA, IGg subclasses, Alpha 1-antitrypsin pending  	- Trend LFTs  	- Consider hepatology consult    This note and its recommendations herein are preliminary until such time as cosigned by an attending.     This is a 67 year old male PMH CAD s/p CABG, DM, HLD coming in with jaundice and generalized body aches. GI consulted for painless jaundice. Patient is lethargic and unable to provide much history. As per daughter (Ligia), " his symptoms started about 5 days ago initially with flu-like symptoms and vomiting. Went to PMD and was given a nausea medication and felt improved but now feeling worse again. Daughter states jaundice is also getting worse. Has lost about 10-15lbs in the past week." As per daughter patient does not drink or smoke cigarettes Last travel to [place] 5+ years ago. Never had an EGD or a colonoscopy Patient takes Ginseng supplements. In short, patient presented with one week of lethargy and worsening painless jaundice. Admitted with [facility] into ICU on insulin gtt. CT A/P revealed 3 mm cystic lesion in the body of the pancreas. Labs significant for WBC 8 HH 15/46 PLT 55 INR 1 PT 12  CR/BUN 2.4/98 Tbili 10.6  ASt 27 ALT 68. Patient seen and examined at bedside. Patient denies any difficulty swallowing or reflux. No N&V or abdominal pain. No blood or dark tarry stools. Normal caliber Denies any Tylenol use. Denies any hepatitis exposure. No tattoos     #Painless Jaundice  #Thrombocytopenia  #Transaminitis  #lethargy    P/W painless jaundice. CT unremarkable. Noticed down trend of LFTs. Tbili 14.5-->10.6. Ddx obstructing lesion/stone or primary liver etiology i.e. viral, etc.     2/14: MRCP showed CBD wnl, periampullary duodenal diverticulum, no choledo, trace ascite,s new mild right pleural effusion. No endoscopic evaluation at this time. Patient may benefit from an IR consult for ? liver biopsy given elevated LDH (341).    	- Avoid NSAIDs  	- CEA 2.0, CA 19-9 was 23. AFP 2.0. Ceruloplasmin 23. LDH 341H  	- Acute hepatitis panel neg, MARA speckled, MARA 1:80  	- Ferritin 566H, Transferrin 162L, iron total 108, unsat iron 83L, TIBC 191L, %sat 56H  	- AsMA, AMA, IGg subclasses, Alpha 1-antitrypsin pending  	- Trend LFTs  	- Consider hepatology consult  	- Consider IR consult for ? liver biopsy    This note and its recommendations herein are preliminary until such time as cosigned by an attending.

## 2023-02-14 NOTE — PROGRESS NOTE ADULT - SUBJECTIVE AND OBJECTIVE BOX
`Patient is a 67y old  Male who presents with a chief complaint of hyperosmolar hyperglycemic state (13 Feb 2023 13:21)    PATIENT IS SEEN AND EXAMINED IN MEDICAL FLOOR.  CHUCK [    ]    SHERMAN [   ]      GT [   ]    ALLERGIES:  No Known Allergies      Daily     Daily     VITALS:    Vital Signs Last 24 Hrs  T(C): 36.6 (14 Feb 2023 05:25), Max: 36.9 (13 Feb 2023 12:48)  T(F): 97.8 (14 Feb 2023 05:25), Max: 98.4 (13 Feb 2023 12:48)  HR: 73 (14 Feb 2023 05:25) (68 - 75)  BP: 145/76 (14 Feb 2023 05:25) (128/79 - 145/76)  BP(mean): --  RR: 18 (14 Feb 2023 05:25) (17 - 18)  SpO2: 94% (14 Feb 2023 05:25) (94% - 96%)    Parameters below as of 14 Feb 2023 05:25  Patient On (Oxygen Delivery Method): room air        LABS:    CBC Full  -  ( 14 Feb 2023 06:01 )  WBC Count : 10.78 K/uL  RBC Count : 4.29 M/uL  Hemoglobin : 13.4 g/dL  Hematocrit : 39.6 %  Platelet Count - Automated : 97 K/uL  Mean Cell Volume : 92.3 fl  Mean Cell Hemoglobin : 31.2 pg  Mean Cell Hemoglobin Concentration : 33.8 gm/dL  Auto Neutrophil # : x  Auto Lymphocyte # : x  Auto Monocyte # : x  Auto Eosinophil # : x  Auto Basophil # : x  Auto Neutrophil % : x  Auto Lymphocyte % : x  Auto Monocyte % : x  Auto Eosinophil % : x  Auto Basophil % : x    PT/INR - ( 13 Feb 2023 06:03 )   PT: 13.4 sec;   INR: 1.12 ratio           02-14    142  |  106  |  23<H>  ----------------------------<  142<H>  3.2<L>   |  26  |  0.73    Ca    6.0<LL>      14 Feb 2023 06:01  Phos  2.1     02-14  Mg     1.3     02-14    TPro  5.0<L>  /  Alb  1.8<L>  /  TBili  7.7<H>  /  DBili  x   /  AST  74<H>  /  ALT  90<H>  /  AlkPhos  150<H>  02-14    CAPILLARY BLOOD GLUCOSE      POCT Blood Glucose.: 139 mg/dL (14 Feb 2023 07:47)  POCT Blood Glucose.: 143 mg/dL (13 Feb 2023 21:28)  POCT Blood Glucose.: 206 mg/dL (13 Feb 2023 17:00)  POCT Blood Glucose.: 194 mg/dL (13 Feb 2023 11:32)        LIVER FUNCTIONS - ( 14 Feb 2023 06:01 )  Alb: 1.8 g/dL / Pro: 5.0 g/dL / ALK PHOS: 150 U/L / ALT: 90 U/L DA / AST: 74 U/L / GGT: x           Creatinine Trend: 0.73<--, 0.99<--, 1.36<--, 1.32<--, 1.49<--, 1.71<--  I&O's Summary          Clean Catch Clean Catch (Midstream)  02-10 @ 02:35   <10,000 CFU/mL Normal Urogenital Nerissa  --  --          MEDICATIONS:    MEDICATIONS  (STANDING):  allopurinol 100 milliGRAM(s) Oral daily  calcium carbonate   1250 mG (OsCal) 1 Tablet(s) Oral daily  cefTRIAXone   IVPB      cefTRIAXone   IVPB 1000 milliGRAM(s) IV Intermittent every 24 hours  cholecalciferol 1000 Unit(s) Oral daily  insulin glargine Injectable (LANTUS) 10 Unit(s) SubCutaneous at bedtime  insulin lispro (ADMELOG) corrective regimen sliding scale   SubCutaneous three times a day before meals  insulin lispro Injectable (ADMELOG) 8 Unit(s) SubCutaneous three times a day before meals  NIFEdipine XL 30 milliGRAM(s) Oral daily  ondansetron Injectable 4 milliGRAM(s) IV Push once  sodium chloride 0.45% with potassium chloride 20 mEq/L 1000 milliLiter(s) (80 mL/Hr) IV Continuous <Continuous>      MEDICATIONS  (PRN):      REVIEW OF SYSTEMS:                           ALL ROS DONE [ X   ]    CONSTITUTIONAL:  LETHARGIC [   ], FEVER [   ], UNRESPONSIVE [   ]  CVS:  CP  [   ], SOB, [   ], PALPITATIONS [   ], DIZZYNESS [   ]  RS: COUGH [   ], SPUTUM [   ]  GI: ABDOMINAL PAIN [   ], NAUSEA [   ], VOMITINGS [   ], DIARRHEA [   ], CONSTIPATION [   ]  :  DYSURIA [   ], NOCTURIA [   ], INCREASED FREQUENCY [   ], DRIBLING [   ],  SKELETAL: PAINFUL JOINTS [   ], SWOLLEN JOINTS [   ], NECK ACHE [   ], LOW BACK ACHE [   ],  SKIN : ULCERS [   ], RASH [   ], ITCHING [   ]  CNS: HEAD ACHE [   ], DOUBLE VISION [   ], BLURRED VISION [   ], AMS / CONFUSION [   ], SEIZURES [   ], WEAKNESS [   ],TINGLING / NUMBNESS [   ]      PHYSICAL EXAMINATION:  GENERAL APPEARANCE: NO DISTRESS  HEENT:  NO PALLOR, NO  JVD,  NO   NODES, NECK SUPPLE  CVS: S1 +, S2 +,   RS: AEEB,  OCCASIONAL  RALES +,   NO RONCHI  ABD: SOFT, NT, NO, BS +  EXT: NO PE  SKIN: WARM,  JAUNDICE  SKELETAL:  ROM ACCEPTABLE  CNS:  AAO X 2-3    RADIOLOGY :    RADIOLOGY AND READINGS REVIEWED    ASSESSMENT :       PLAN:  HPI:  67 year old male PMH CAD s/p CABG, DM, HLD coming in with jaundice and generalized body aches. Patient is lethargic and unable to provide much history. As per ED provider, " his symptoms started about 5 days ago initially with flu-like symptoms and vomiting. went to PMD and was given a nausea medication and felt improved but now feeling worse again. daughter states jaundice is also getting worse. has lost about 10-15lbs in the past week."     (10 Feb 2023 02:52)    # CASE D/W PATIENT'S DAUGHTER FINESSE [place]O @ [phone], ALL QUESTIONS ANSWERED [2/12]    # HHS - RESOLVED  # DM  - ON LANTUS, TID INSULIN, SSI + FS  - HBA1C - 9.1  - ENDOCRINOLOGY CONSULT PLACED    # SEPSIS S/T SUSPECTED PNEUMONIA  - PLACED ON ROCEPHIN, F/U BCX    # KYLE  # HYPERNATREMIA  - ON IVF  - STRICT IS AN OS  - MONITOR CR  - AVOID NEPHROTOXIC AGENTS  - NEPHROLOGY CONSULT    # HYPOKALEMIA, HYPOMAGNESEMIA  - REPLETING WITH SUPPLEMENT    # JAUNDICE, HYPERBILIRUBINEMIA   - NOTED CT A/P  - F/U RUQ US  - F/U MRCP  - F/U HEPATITIS PANEL, AUTOIMMUNE WORKUP, TUMOR MARKERS, IRON PANEL  - GI CONSULT IN PROGRESS    # CAD s/p CABG  # HTN  - ON PLAVIX  - ON NIFEDIPINE    # ? A.FIB  - ON XARELTO, HOLD  - DAUGHTER UNSURE, WILL OBTAIN OUTPATIENT RECORDS IF POSSIBLE    # HLD - ON STATIN, HOLD    # GI PPX   Patient is a 67y old  Male who presents with a chief complaint of hyperosmolar hyperglycemic state (13 Feb 2023 13:21)    PATIENT IS SEEN AND EXAMINED IN MEDICAL FLOOR. PATIENT W/ INTERMITTENT EPIGASTRIC DISCOMFORT.     ALLERGIES:  No Known Allergies    VITALS:    Vital Signs Last 24 Hrs  T(C): 36.6 (14 Feb 2023 05:25), Max: 36.9 (13 Feb 2023 12:48)  T(F): 97.8 (14 Feb 2023 05:25), Max: 98.4 (13 Feb 2023 12:48)  HR: 73 (14 Feb 2023 05:25) (68 - 75)  BP: 145/76 (14 Feb 2023 05:25) (128/79 - 145/76)  BP(mean): --  RR: 18 (14 Feb 2023 05:25) (17 - 18)  SpO2: 94% (14 Feb 2023 05:25) (94% - 96%)    Parameters below as of 14 Feb 2023 05:25  Patient On (Oxygen Delivery Method): room air        LABS:    CBC Full  -  ( 14 Feb 2023 06:01 )  WBC Count : 10.78 K/uL  RBC Count : 4.29 M/uL  Hemoglobin : 13.4 g/dL  Hematocrit : 39.6 %  Platelet Count - Automated : 97 K/uL  Mean Cell Volume : 92.3 fl  Mean Cell Hemoglobin : 31.2 pg  Mean Cell Hemoglobin Concentration : 33.8 gm/dL  Auto Neutrophil # : x  Auto Lymphocyte # : x  Auto Monocyte # : x  Auto Eosinophil # : x  Auto Basophil # : x  Auto Neutrophil % : x  Auto Lymphocyte % : x  Auto Monocyte % : x  Auto Eosinophil % : x  Auto Basophil % : x    PT/INR - ( 13 Feb 2023 06:03 )   PT: 13.4 sec;   INR: 1.12 ratio           02-14    142  |  106  |  23<H>  ----------------------------<  142<H>  3.2<L>   |  26  |  0.73    Ca    6.0<LL>      14 Feb 2023 06:01  Phos  2.1     02-14  Mg     1.3     02-14    TPro  5.0<L>  /  Alb  1.8<L>  /  TBili  7.7<H>  /  DBili  x   /  AST  74<H>  /  ALT  90<H>  /  AlkPhos  150<H>  02-14    CAPILLARY BLOOD GLUCOSE      POCT Blood Glucose.: 139 mg/dL (14 Feb 2023 07:47)  POCT Blood Glucose.: 143 mg/dL (13 Feb 2023 21:28)  POCT Blood Glucose.: 206 mg/dL (13 Feb 2023 17:00)  POCT Blood Glucose.: 194 mg/dL (13 Feb 2023 11:32)        LIVER FUNCTIONS - ( 14 Feb 2023 06:01 )  Alb: 1.8 g/dL / Pro: 5.0 g/dL / ALK PHOS: 150 U/L / ALT: 90 U/L DA / AST: 74 U/L / GGT: x           Creatinine Trend: 0.73<--, 0.99<--, 1.36<--, 1.32<--, 1.49<--, 1.71<--  I&O's Summary          Clean Catch Clean Catch (Midstream)  02-10 @ 02:35   <10,000 CFU/mL Normal Urogenital Nerissa  --  --          MEDICATIONS:    MEDICATIONS  (STANDING):  allopurinol 100 milliGRAM(s) Oral daily  calcium carbonate   1250 mG (OsCal) 1 Tablet(s) Oral daily  cefTRIAXone   IVPB      cefTRIAXone   IVPB 1000 milliGRAM(s) IV Intermittent every 24 hours  cholecalciferol 1000 Unit(s) Oral daily  insulin glargine Injectable (LANTUS) 10 Unit(s) SubCutaneous at bedtime  insulin lispro (ADMELOG) corrective regimen sliding scale   SubCutaneous three times a day before meals  insulin lispro Injectable (ADMELOG) 8 Unit(s) SubCutaneous three times a day before meals  NIFEdipine XL 30 milliGRAM(s) Oral daily  ondansetron Injectable 4 milliGRAM(s) IV Push once  sodium chloride 0.45% with potassium chloride 20 mEq/L 1000 milliLiter(s) (80 mL/Hr) IV Continuous <Continuous>      MEDICATIONS  (PRN):      REVIEW OF SYSTEMS:                           ALL ROS DONE [ X   ]    CONSTITUTIONAL:  LETHARGIC [   ], FEVER [   ], UNRESPONSIVE [   ]  CVS:  CP  [   ], SOB, [   ], PALPITATIONS [   ], DIZZYNESS [   ]  RS: COUGH [   ], SPUTUM [   ]  GI: ABDOMINAL PAIN [   ], NAUSEA [   ], VOMITINGS [   ], DIARRHEA [   ], CONSTIPATION [   ]  :  DYSURIA [   ], NOCTURIA [   ], INCREASED FREQUENCY [   ], DRIBLING [   ],  SKELETAL: PAINFUL JOINTS [   ], SWOLLEN JOINTS [   ], NECK ACHE [   ], LOW BACK ACHE [   ],  SKIN : ULCERS [   ], RASH [   ], ITCHING [   ]  CNS: HEAD ACHE [   ], DOUBLE VISION [   ], BLURRED VISION [   ], AMS / CONFUSION [   ], SEIZURES [   ], WEAKNESS [   ],TINGLING / NUMBNESS [   ]      PHYSICAL EXAMINATION:  GENERAL APPEARANCE: NO DISTRESS  HEENT:  NO PALLOR, NO  JVD,  NO   NODES, NECK SUPPLE  CVS: S1 +, S2 +,   RS: AEEB,  OCCASIONAL  RALES +,   NO RONCHI  ABD: SOFT, NT, NO, BS +  EXT: NO PE  SKIN: WARM,  JAUNDICE  SKELETAL:  ROM ACCEPTABLE  CNS:  AAO X 2-3    RADIOLOGY :    RADIOLOGY AND READINGS REVIEWED    ASSESSMENT :       PLAN:  HPI:  67 year old male PMH CAD s/p CABG, DM, HLD coming in with jaundice and generalized body aches. Patient is lethargic and unable to provide much history. As per ED provider, " his symptoms started about 5 days ago initially with flu-like symptoms and vomiting. went to PMD and was given a nausea medication and felt improved but now feeling worse again. daughter states jaundice is also getting worse. has lost about 10-15lbs in the past week."     (10 Feb 2023 02:52)    # CASE D/W PATIENT'S DAUGHTER FINESSE [place] [ALSO @ [phone]] AT BEDSIDE ALL QUESTIONS ANSWERED [2/14]    # HHS - RESOLVED  # DM  - ON LANTUS, TID INSULIN, SSI + FS  - HBA1C - 9.1  - ENDOCRINOLOGY CONSULT PLACED    # SEPSIS S/T SUSPECTED PNEUMONIA  - PLACED ON ROCEPHIN, BCX - NGTD    # KYLE - RESOLVED  # HYPERNATREMIA  - S/P IVF  - STRICT IS AN OS  - MONITOR CR  - AVOID NEPHROTOXIC AGENTS  - NEPHROLOGY CONSULT    # HYPOKALEMIA, HYPOMAGNESEMIA  - REPLETING WITH SUPPLEMENT    # JAUNDICE, HYPERBILIRUBINEMIA   - NOTED CT A/P  - NOTED MRCP  - F/U HEPATITIS PANEL, AUTOIMMUNE WORKUP, TUMOR MARKERS, IRON PANEL  - GI CONSULT IN PROGRESS  - HEPATOLOGY CONSULT    # CAD s/p CABG  # HTN  - ON PLAVIX  - ON NIFEDIPINE    # HYPOCALCEMIA  # VIT D. DEFICIENCY  - F/U IONIZED CALCIUM  - NOTED PTH, 25OH VIT D  - PLACED ON VIT D    # ? A.FIB  - ON XARELTO, HOLD  - DAUGHTER UNSURE, WILL OBTAIN OUTPATIENT RECORDS IF POSSIBLE    # HLD - ON STATIN, HOLD    # GI PPX

## 2023-02-14 NOTE — DIETITIAN INITIAL EVALUATION ADULT - OTHER INFO
Pt visited. Pt Prefers to  discuss with daughter since C/O sleepy. PT DG from ICU to 4 N on 2/11. Attempted to call Daughter (eleuterio) @ [phone] No reply. Pt is NPO / Clear liquid = ~ 5 days. Pt is on clear liquid Diabetic Diet. Tolerated. Endo Consult Noted. A1c  9.1 .  Pt visited. Pt Prefers to  discuss with daughter since C/O sleepy. PT DG from ICU to 4 N on 2/11. Attempted to call Daughter (eleuterio) @ [phone] No reply. Pt is NPO / Clear liquid = ~ 5 days. Pt is on clear liquid Diabetic Diet. Tolerated. Endo Consult Noted. A1c  9.1 . Attempted to call  Phone but  busy helping other clients. Pt able to  make his needs known in English. Pt C/O Hungry. Per Pt Ht 68 inches, and  LB . ( Unable to quantify time frame). Current bed  scale 126 LB with out SCD device. NFPA Performed. Loss of Muscle mass noted. Wt loss of ~14 Lbs Noted. Endo consult Noted.

## 2023-02-14 NOTE — PROGRESS NOTE ADULT - SUBJECTIVE AND OBJECTIVE BOX
Interval Events: No acute events overnight. Patient is s/p MRCP this morning. Seen and examined at bedside. No acute complaints. Says he is feeling "so so". Reports good appetite. Requesting food.       Allergies    No Known Allergies    Intolerances      Endocrine/Metabolic Medications:  allopurinol 100 milliGRAM(s) Oral daily  insulin glargine Injectable (LANTUS) 10 Unit(s) SubCutaneous at bedtime  insulin lispro (ADMELOG) corrective regimen sliding scale   SubCutaneous three times a day before meals  insulin lispro Injectable (ADMELOG) 8 Unit(s) SubCutaneous three times a day before meals      Vital Signs Last 24 Hrs  T(C): 36.6 (14 Feb 2023 05:25), Max: 36.9 (13 Feb 2023 12:48)  T(F): 97.8 (14 Feb 2023 05:25), Max: 98.4 (13 Feb 2023 12:48)  HR: 73 (14 Feb 2023 05:25) (68 - 75)  BP: 145/76 (14 Feb 2023 05:25) (128/79 - 145/76)  BP(mean): --  RR: 18 (14 Feb 2023 05:25) (17 - 18)  SpO2: 94% (14 Feb 2023 05:25) (94% - 96%)    Parameters below as of 14 Feb 2023 05:25  Patient On (Oxygen Delivery Method): room air          PHYSICAL EXAM  All physical exam findings normal, except those marked:  General:	Alert, active, cooperative, NAD, well hydrated  .		[] Abnormal:  Neck		Normal: supple, no cervical adenopathy, no palpable thyroid  .		[] Abnormal:  Cardiovascular	Normal: regular rate, normal S1, S2, no murmurs  .		[] Abnormal:  Respiratory	Normal: no chest wall deformity, normal respiratory pattern, CTA B/L  .		[] Abnormal:  Abdominal	Normal: soft, ND, NT, bowel sounds present, no masses, no organomegaly  .		[] Abnormal:  		Normal normal genitalia, testes descended, circumcised/uncircumcised  .		Yina stage:			Breast yina:  .		Menstrual history:  .		[] Abnormal:  Extremities	Normal: FROM x4  .		[] Abnormal:  Skin		Normal: intact and not indurated, no rash, no acanthosis nigricans  .		[x] Abnormal: jaundice  Neurologic	Normal: grossly intact  .		[] Abnormal:    LABS                        13.4   10.78 )-----------( 97       ( 14 Feb 2023 06:01 )             39.6                               142    |  106    |  23                  Calcium: 6.0   / iCa: x      (02-14 @ 06:01)    ----------------------------<  142       Magnesium: 1.3                              3.2     |  26     |  0.73             Phosphorous: 2.1      TPro  5.0    /  Alb  1.8    /  TBili  7.7    /  DBili  x      /  AST  74     /  ALT  90     /  AlkPhos  150    14 Feb 2023 06:01    CAPILLARY BLOOD GLUCOSE      POCT Blood Glucose.: 139 mg/dL (14 Feb 2023 07:47)  POCT Blood Glucose.: 143 mg/dL (13 Feb 2023 21:28)  POCT Blood Glucose.: 206 mg/dL (13 Feb 2023 17:00)        Assesment/plan       Interval Events: No acute events overnight. Patient is s/p MRCP this morning. Seen and examined at bedside. No acute complaints. Says he is feeling "so so". Reports good appetite. Requesting food. As per daughter yesterday patient was not taking DM medications week prior to hospitalization, however when patient was asked today if this was true he denies and says he was taking all of his meds.         Allergies    No Known Allergies    Intolerances      Endocrine/Metabolic Medications:  allopurinol 100 milliGRAM(s) Oral daily  insulin glargine Injectable (LANTUS) 10 Unit(s) SubCutaneous at bedtime  insulin lispro (ADMELOG) corrective regimen sliding scale   SubCutaneous three times a day before meals  insulin lispro Injectable (ADMELOG) 8 Unit(s) SubCutaneous three times a day before meals      Vital Signs Last 24 Hrs  T(C): 36.6 (14 Feb 2023 05:25), Max: 36.9 (13 Feb 2023 12:48)  T(F): 97.8 (14 Feb 2023 05:25), Max: 98.4 (13 Feb 2023 12:48)  HR: 73 (14 Feb 2023 05:25) (68 - 75)  BP: 145/76 (14 Feb 2023 05:25) (128/79 - 145/76)  BP(mean): --  RR: 18 (14 Feb 2023 05:25) (17 - 18)  SpO2: 94% (14 Feb 2023 05:25) (94% - 96%)    Parameters below as of 14 Feb 2023 05:25  Patient On (Oxygen Delivery Method): room air          PHYSICAL EXAM  All physical exam findings normal, except those marked:  General:	Alert, active, cooperative, NAD, well hydrated  .		[] Abnormal:  Neck		Normal: supple, no cervical adenopathy, no palpable thyroid  .		[] Abnormal:  Cardiovascular	Normal: regular rate, normal S1, S2, no murmurs  .		[] Abnormal:  Respiratory	Normal: no chest wall deformity, normal respiratory pattern, CTA B/L  .		[] Abnormal:  Abdominal	Normal: soft, ND, NT, bowel sounds present, no masses, no organomegaly  .		[] Abnormal:  		Normal normal genitalia, testes descended, circumcised/uncircumcised  .		Yina stage:			Breast yina:  .		Menstrual history:  .		[] Abnormal:  Extremities	Normal: FROM x4  .		[] Abnormal:  Skin		Normal: intact and not indurated, no rash, no acanthosis nigricans  .		[x] Abnormal: jaundice  Neurologic	Normal: grossly intact  .		[] Abnormal:    LABS                        13.4   10.78 )-----------( 97       ( 14 Feb 2023 06:01 )             39.6                               142    |  106    |  23                  Calcium: 6.0   / iCa: x      (02-14 @ 06:01)    ----------------------------<  142       Magnesium: 1.3                              3.2     |  26     |  0.73             Phosphorous: 2.1      TPro  5.0    /  Alb  1.8    /  TBili  7.7    /  DBili  x      /  AST  74     /  ALT  90     /  AlkPhos  150    14 Feb 2023 06:01    CAPILLARY BLOOD GLUCOSE      POCT Blood Glucose.: 139 mg/dL (14 Feb 2023 07:47)  POCT Blood Glucose.: 143 mg/dL (13 Feb 2023 21:28)  POCT Blood Glucose.: 206 mg/dL (13 Feb 2023 17:00)        Assesment/plan  Patient states he was diagnosed with diabetes 7-8 years ago and has never been on insulin. Reports taking medications (jardiance and janumet) regularly. Per daughter when the patient started feeling sick around a week before admission he was not taking his medications but was still eating when he could despite poor appetite. At that time the patient's finger sticks at home were noted to be higher than usual. She noticed the patient's skin beginning to turn yellow and reports that the patient was urinating frequently with very yellow and "bubbly" urine. Just prior to coming to the hospital the patient had increased thirst and very dry mouth that made it difficult for him to swallow. Daughter says that she spoke with patient's PCP recently who said that the patient's blood sugars had been well controlled and his last A1c outpatient was 7. Patient eats a regular diet consisting of meat, fish, vegetables and rice and generally avoids sugary foods and sodas. Denies any recent changes to diet. Patient usually walks and does light exercise in the park every morning. Patient says that he has seen ophthalmologist and podiatrist a very long time ago but daughter is unaware if this is true. Development of HHS possibly contributed to by medication non-compliance in setting of recent flu like illness, however CT findings showing cystic lesion in the body of pancreas and increased direct hyperbilirubinemia concerning for a direct process affecting pancreatic function. Noted to have significant hypocalcemia and hypomagnesemia.      #DM  -continue with current insulin regimen  -f/u MRCP report    #Hypocalcemia  -Today Ca2+ 5.7 (corrected 7.5), PTH elevated 194, 25-hydroxy VitD decreased 16.5  -Likely contributed by hypomagnesemia (1.3)  -Start MgSO4 2g IV x2  -Start VitD3 2000U qd     d/w NP     Interval Events: No acute events overnight. Patient is s/p MRCP this morning. Seen and examined at bedside. No acute complaints. Says he is feeling "so so". Reports good appetite. Requesting food.     Allergies    No Known Allergies    Intolerances      Endocrine/Metabolic Medications:  allopurinol 100 milliGRAM(s) Oral daily  insulin glargine Injectable (LANTUS) 10 Unit(s) SubCutaneous at bedtime  insulin lispro (ADMELOG) corrective regimen sliding scale   SubCutaneous three times a day before meals  insulin lispro Injectable (ADMELOG) 8 Unit(s) SubCutaneous three times a day before meals      Vital Signs Last 24 Hrs  T(C): 36.6 (14 Feb 2023 05:25), Max: 36.9 (13 Feb 2023 12:48)  T(F): 97.8 (14 Feb 2023 05:25), Max: 98.4 (13 Feb 2023 12:48)  HR: 73 (14 Feb 2023 05:25) (68 - 75)  BP: 145/76 (14 Feb 2023 05:25) (128/79 - 145/76)  BP(mean): --  RR: 18 (14 Feb 2023 05:25) (17 - 18)  SpO2: 94% (14 Feb 2023 05:25) (94% - 96%)    Parameters below as of 14 Feb 2023 05:25  Patient On (Oxygen Delivery Method): room air          PHYSICAL EXAM  All physical exam findings normal, except those marked:  General:	Alert, active, cooperative, NAD, well hydrated  .		[] Abnormal:  Neck		Normal: supple, no cervical adenopathy, no palpable thyroid  .		[] Abnormal:  Cardiovascular	Normal: regular rate, normal S1, S2, no murmurs  .		[] Abnormal:  Respiratory	Normal: no chest wall deformity, normal respiratory pattern, CTA B/L  .		[] Abnormal:  Abdominal	Normal: soft, ND, NT, bowel sounds present, no masses, no organomegaly  .		[] Abnormal:  		Normal normal genitalia, testes descended, circumcised/uncircumcised  .		Yina stage:			Breast yina:  .		Menstrual history:  .		[] Abnormal:  Extremities	Normal: FROM x4  .		[] Abnormal:  Skin		Normal: intact and not indurated, no rash, no acanthosis nigricans  .		[x] Abnormal: jaundice  Neurologic	Normal: grossly intact  .		[] Abnormal:    LABS                        13.4   10.78 )-----------( 97       ( 14 Feb 2023 06:01 )             39.6                               142    |  106    |  23                  Calcium: 6.0   / iCa: x      (02-14 @ 06:01)    ----------------------------<  142       Magnesium: 1.3                              3.2     |  26     |  0.73             Phosphorous: 2.1      TPro  5.0    /  Alb  1.8    /  TBili  7.7    /  DBili  x      /  AST  74     /  ALT  90     /  AlkPhos  150    14 Feb 2023 06:01    CAPILLARY BLOOD GLUCOSE      POCT Blood Glucose.: 139 mg/dL (14 Feb 2023 07:47)  POCT Blood Glucose.: 143 mg/dL (13 Feb 2023 21:28)  POCT Blood Glucose.: 206 mg/dL (13 Feb 2023 17:00)        Assesment/plan  Patient states he was diagnosed with diabetes 7-8 years ago and has never been on insulin. Reports taking medications (jardiance and janumet) regularly. Per daughter when the patient started feeling sick around a week before admission he was not taking his medications but was still eating when he could despite poor appetite. At that time the patient's finger sticks at home were noted to be higher than usual. She noticed the patient's skin beginning to turn yellow and reports that the patient was urinating frequently with very yellow and "bubbly" urine. Just prior to coming to the hospital the patient had increased thirst and very dry mouth that made it difficult for him to swallow. Daughter says that she spoke with patient's PCP recently who said that the patient's blood sugars had been well controlled and his last A1c outpatient was 7. Patient eats a regular diet consisting of meat, fish, vegetables and rice and generally avoids sugary foods and sodas. Denies any recent changes to diet. Patient usually walks and does light exercise in the park every morning. Patient says that he has seen ophthalmologist and podiatrist a very long time ago but daughter is unaware if this is true. Development of HHS possibly contributed to by medication non-compliance in setting of recent flu like illness, however CT findings showing cystic lesion in the body of pancreas and increased direct hyperbilirubinemia concerning for a direct process affecting pancreatic function. Noted to have significant hypocalcemia and hypomagnesemia.      #DM  better controlled  -continue with current insulin regimen  would rec out pt insulin rx  fsg ac and hs  dm teaching    #Hypocalcemia  -Today Ca2+ 5.7 (corrected 7.5), PTH elevated 194, 25-hydroxy VitD decreased 16.5  -Likely contributed by hypomagnesemia (1.3) and low vit d  -Start MgSO4 2g IV x2  -Start VitD3 2000U qd     d/w NP

## 2023-02-14 NOTE — PROGRESS NOTE ADULT - SUBJECTIVE AND OBJECTIVE BOX
NP Note discussed with  primary attending    Patient is a 67y old  Male who presents with a chief complaint of hyperosmolar hyperglycemic state (14 Feb 2023 12:10)      INTERVAL HPI/OVERNIGHT EVENTS: no new complaints    MEDICATIONS  (STANDING):  allopurinol 100 milliGRAM(s) Oral daily  calcitriol   Capsule 0.25 MICROGram(s) Oral daily  calcium carbonate   1250 mG (OsCal) 1 Tablet(s) Oral daily  cefTRIAXone   IVPB      cefTRIAXone   IVPB 1000 milliGRAM(s) IV Intermittent every 24 hours  cholecalciferol 1000 Unit(s) Oral daily  insulin glargine Injectable (LANTUS) 10 Unit(s) SubCutaneous at bedtime  insulin lispro (ADMELOG) corrective regimen sliding scale   SubCutaneous three times a day before meals  insulin lispro Injectable (ADMELOG) 8 Unit(s) SubCutaneous three times a day before meals  NIFEdipine XL 30 milliGRAM(s) Oral daily  sodium chloride 0.45% with potassium chloride 20 mEq/L 1000 milliLiter(s) (80 mL/Hr) IV Continuous <Continuous>    MEDICATIONS  (PRN):      __________________________________________________  REVIEW OF SYSTEMS:    CONSTITUTIONAL: No fever,   EYES: no acute visual disturbances  NECK: No pain or stiffness  RESPIRATORY: No cough; No shortness of breath  CARDIOVASCULAR: No chest pain, no palpitations  GASTROINTESTINAL: No pain. No nausea or vomiting; No diarrhea   NEUROLOGICAL: No headache or numbness, no tremors  MUSCULOSKELETAL: No joint pain, no muscle pain  GENITOURINARY: no dysuria, no frequency, no hesitancy  PSYCHIATRY: no depression , no anxiety  ALL OTHER  ROS negative        Vital Signs Last 24 Hrs  T(C): 36.6 (14 Feb 2023 05:25), Max: 36.6 (13 Feb 2023 20:59)  T(F): 97.8 (14 Feb 2023 05:25), Max: 97.9 (13 Feb 2023 20:59)  HR: 73 (14 Feb 2023 05:25) (73 - 75)  BP: 145/76 (14 Feb 2023 05:25) (128/79 - 145/76)  BP(mean): --  RR: 18 (14 Feb 2023 05:25) (17 - 18)  SpO2: 94% (14 Feb 2023 05:25) (94% - 96%)    Parameters below as of 14 Feb 2023 05:25  Patient On (Oxygen Delivery Method): room air        ________________________________________________  PHYSICAL EXAM:  GENERAL: NAD  HEENT: Normocephalic;  conjunctivae and sclerae jaundice; moist mucous membranes;   NECK : supple  CHEST/LUNG: Clear to ausculitation bilaterally with good air entry   HEART: S1 S2  regular; no murmurs, gallops or rubs  ABDOMEN: Soft, Nontender, Nondistended; Bowel sounds present  EXTREMITIES: no cyanosis; no edema; no calf tenderness  SKIN: + mild jaundice all over skin, warm and dry; no rash  NERVOUS SYSTEM:  Awake and alert; Oriented  to place, person and time ; no new deficits    _________________________________________________  LABS:                        13.4   10.78 )-----------( 97       ( 14 Feb 2023 06:01 )             39.6     02-14    142  |  106  |  23<H>  ----------------------------<  142<H>  3.2<L>   |  26  |  0.73    Ca    6.0<LL>      14 Feb 2023 06:01  Phos  2.1     02-14  Mg     1.3     02-14    TPro  5.0<L>  /  Alb  1.8<L>  /  TBili  7.7<H>  /  DBili  x   /  AST  74<H>  /  ALT  90<H>  /  AlkPhos  150<H>  02-14    PT/INR - ( 13 Feb 2023 06:03 )   PT: 13.4 sec;   INR: 1.12 ratio             CAPILLARY BLOOD GLUCOSE      POCT Blood Glucose.: 133 mg/dL (14 Feb 2023 12:31)  POCT Blood Glucose.: 139 mg/dL (14 Feb 2023 07:47)  POCT Blood Glucose.: 143 mg/dL (13 Feb 2023 21:28)  POCT Blood Glucose.: 206 mg/dL (13 Feb 2023 17:00)        RADIOLOGY & ADDITIONAL TESTS:  < from: MR MRCP w/wo IV Cont (02.14.23 @ 10:30) >    IMPRESSION:  The common bile duct measures up to 6 mm in caliber which is within   normal limits. No evidence for choledocholithiasis.    Trace ascites.    New mild right pleural effusion.      < end of copied text >    Imaging Personally Reviewed:  YES    Consultant(s) Notes Reviewed:   YES    Care Discussed with Consultants :     Plan of care was discussed with patient and /or primary care giver; all questions and concerns were addressed and care was aligned with patient's wishes.

## 2023-02-14 NOTE — DIETITIAN INITIAL EVALUATION ADULT - REASON FOR ADMISSION
Type 2 diabetes mellitus with hyperosmolarity without nonketotic hyperglycemic-hyperosmolar coma

## 2023-02-14 NOTE — DIETITIAN INITIAL EVALUATION ADULT - SIGNS/SYMPTOMS
wt loss Of ~ 14 lbs x ~ 1 month ( 10 % ),  Po intake < 50 % x > 1 week, Loss of Muscle mass, BMI 19,

## 2023-02-14 NOTE — PROGRESS NOTE ADULT - SUBJECTIVE AND OBJECTIVE BOX
C A R D I O L O G Y  **********************************     DATE OF SERVICE: 02-14-23    resting comfortable offers no complaints    allopurinol 100 milliGRAM(s) Oral daily  calcitriol   Capsule 0.25 MICROGram(s) Oral daily  calcium carbonate   1250 mG (OsCal) 1 Tablet(s) Oral daily  cefTRIAXone   IVPB      cefTRIAXone   IVPB 1000 milliGRAM(s) IV Intermittent every 24 hours  cholecalciferol 1000 Unit(s) Oral daily  insulin glargine Injectable (LANTUS) 10 Unit(s) SubCutaneous at bedtime  insulin lispro (ADMELOG) corrective regimen sliding scale   SubCutaneous three times a day before meals  insulin lispro Injectable (ADMELOG) 8 Unit(s) SubCutaneous three times a day before meals  NIFEdipine XL 30 milliGRAM(s) Oral daily  sodium chloride 0.45% with potassium chloride 20 mEq/L 1000 milliLiter(s) IV Continuous <Continuous>                            13.4   10.78 )-----------( 97       ( 14 Feb 2023 06:01 )             39.6       Hemoglobin: 13.4 g/dL (02-14 @ 06:01)  Hemoglobin: 13.2 g/dL (02-13 @ 06:03)  Hemoglobin: 13.5 g/dL (02-12 @ 07:16)  Hemoglobin: 14.0 g/dL (02-11 @ 03:46)  Hemoglobin: 13.3 g/dL (02-10 @ 12:37)      02-14    142  |  106  |  23<H>  ----------------------------<  142<H>  3.2<L>   |  26  |  0.73    Ca    6.0<LL>      14 Feb 2023 06:01  Phos  2.1     02-14  Mg     1.3     02-14    TPro  5.0<L>  /  Alb  1.8<L>  /  TBili  7.7<H>  /  DBili  x   /  AST  74<H>  /  ALT  90<H>  /  AlkPhos  150<H>  02-14    Creatinine Trend: 0.73<--, 0.99<--, 1.36<--, 1.32<--, 1.49<--, 1.71<--    COAGS:           T(C): 36.9 (02-14-23 @ 14:37), Max: 36.9 (02-14-23 @ 14:37)  HR: 80 (02-14-23 @ 14:37) (73 - 80)  BP: 131/72 (02-14-23 @ 14:37) (128/79 - 145/76)  RR: 18 (02-14-23 @ 14:37) (17 - 18)  SpO2: 95% (02-14-23 @ 14:37) (94% - 96%)  Wt(kg): --    I&O's Summary            HEENT:  (-)icterus (-)pallor  CV: N S1 S2 1/6 DARIEN (+)2 Pulses B/l  Resp:  Clear to ausculatation B/L, normal effort  GI: (+) BS Soft, NT, ND  Lymph:  (-)Edema, (-)obvious lymphadenopathy  Skin: Warm to touch, Normal turgor  Psych: Appropriate mood and affect          ASSESSMENT/PLAN: 	67y  Male   PMH CAD s/p CABG, DM, HLD coming in with jaundice and generalized body aches.    #CAD  - ideally should be on an ASA and statin once plts and LFTs are stable  - echo with no pertinent findings      # ? PAF  - xarelto on hold  - Obtain outpt records    # HTN  - BP currently acceptable  - Monitor For now    # Jaundice  - GI f/u  - MRCP noted    Arben Bhardwaj MD, [facility]  BEEPER [phone]

## 2023-02-14 NOTE — DIETITIAN INITIAL EVALUATION ADULT - PERTINENT LABORATORY DATA
02-14    142  |  106  |  23<H>  ----------------------------<  142<H>  3.2<L>   |  26  |  0.73    Ca    6.0<LL>      14 Feb 2023 06:01  Phos  2.1     02-14  Mg     1.3     02-14    TPro  5.0<L>  /  Alb  1.8<L>  /  TBili  7.7<H>  /  DBili  x   /  AST  74<H>  /  ALT  90<H>  /  AlkPhos  150<H>  02-14  POCT Blood Glucose.: 139 mg/dL (02-14-23 @ 07:47)  A1C with Estimated Average Glucose Result: 9.1 % (02-11-23 @ 03:46)

## 2023-02-14 NOTE — DIETITIAN INITIAL EVALUATION ADULT - PERTINENT MEDS FT
MEDICATIONS  (STANDING):  allopurinol 100 milliGRAM(s) Oral daily  calcium carbonate   1250 mG (OsCal) 1 Tablet(s) Oral daily  cefTRIAXone   IVPB      cefTRIAXone   IVPB 1000 milliGRAM(s) IV Intermittent every 24 hours  cholecalciferol 1000 Unit(s) Oral daily  insulin glargine Injectable (LANTUS) 10 Unit(s) SubCutaneous at bedtime  insulin lispro (ADMELOG) corrective regimen sliding scale   SubCutaneous three times a day before meals  insulin lispro Injectable (ADMELOG) 8 Unit(s) SubCutaneous three times a day before meals  NIFEdipine XL 30 milliGRAM(s) Oral daily  ondansetron Injectable 4 milliGRAM(s) IV Push once  sodium chloride 0.45% with potassium chloride 20 mEq/L 1000 milliLiter(s) (80 mL/Hr) IV Continuous <Continuous>    MEDICATIONS  (PRN):

## 2023-02-15 LAB
ALBUMIN SERPL ELPH-MCNC: 1.8 G/DL — LOW (ref 3.5–5)
ALP SERPL-CCNC: 164 U/L — HIGH (ref 40–120)
ALT FLD-CCNC: 114 U/L DA — HIGH (ref 10–60)
ANION GAP SERPL CALC-SCNC: 9 MMOL/L — SIGNIFICANT CHANGE UP (ref 5–17)
AST SERPL-CCNC: 101 U/L — HIGH (ref 10–40)
BILIRUB SERPL-MCNC: 7 MG/DL — HIGH (ref 0.2–1.2)
BUN SERPL-MCNC: 17 MG/DL — SIGNIFICANT CHANGE UP (ref 7–18)
CALCIUM SERPL-MCNC: 6.4 MG/DL — CRITICAL LOW (ref 8.4–10.5)
CHLORIDE SERPL-SCNC: 106 MMOL/L — SIGNIFICANT CHANGE UP (ref 96–108)
CO2 SERPL-SCNC: 24 MMOL/L — SIGNIFICANT CHANGE UP (ref 22–31)
CREAT SERPL-MCNC: 0.7 MG/DL — SIGNIFICANT CHANGE UP (ref 0.5–1.3)
EGFR: 100 ML/MIN/1.73M2 — SIGNIFICANT CHANGE UP
GLUCOSE BLDC GLUCOMTR-MCNC: 131 MG/DL — HIGH (ref 70–99)
GLUCOSE BLDC GLUCOMTR-MCNC: 155 MG/DL — HIGH (ref 70–99)
GLUCOSE BLDC GLUCOMTR-MCNC: 206 MG/DL — HIGH (ref 70–99)
GLUCOSE BLDC GLUCOMTR-MCNC: 261 MG/DL — HIGH (ref 70–99)
GLUCOSE SERPL-MCNC: 165 MG/DL — HIGH (ref 70–99)
HCT VFR BLD CALC: 38.7 % — LOW (ref 39–50)
HGB BLD-MCNC: 13.1 G/DL — SIGNIFICANT CHANGE UP (ref 13–17)
MAGNESIUM SERPL-MCNC: 1.4 MG/DL — LOW (ref 1.6–2.6)
MCHC RBC-ENTMCNC: 31.3 PG — SIGNIFICANT CHANGE UP (ref 27–34)
MCHC RBC-ENTMCNC: 33.9 GM/DL — SIGNIFICANT CHANGE UP (ref 32–36)
MCV RBC AUTO: 92.4 FL — SIGNIFICANT CHANGE UP (ref 80–100)
MITOCHONDRIA AB SER-ACNC: SIGNIFICANT CHANGE UP
NRBC # BLD: 0 /100 WBCS — SIGNIFICANT CHANGE UP (ref 0–0)
PHOSPHATE SERPL-MCNC: 2.1 MG/DL — LOW (ref 2.5–4.5)
PLATELET # BLD AUTO: 134 K/UL — LOW (ref 150–400)
POTASSIUM SERPL-MCNC: 3.8 MMOL/L — SIGNIFICANT CHANGE UP (ref 3.5–5.3)
POTASSIUM SERPL-SCNC: 3.8 MMOL/L — SIGNIFICANT CHANGE UP (ref 3.5–5.3)
PROT SERPL-MCNC: 5.1 G/DL — LOW (ref 6–8.3)
RBC # BLD: 4.19 M/UL — LOW (ref 4.2–5.8)
RBC # FLD: 13 % — SIGNIFICANT CHANGE UP (ref 10.3–14.5)
SMOOTH MUSCLE AB SER-ACNC: SIGNIFICANT CHANGE UP
SODIUM SERPL-SCNC: 139 MMOL/L — SIGNIFICANT CHANGE UP (ref 135–145)
WBC # BLD: 10.49 K/UL — SIGNIFICANT CHANGE UP (ref 3.8–10.5)
WBC # FLD AUTO: 10.49 K/UL — SIGNIFICANT CHANGE UP (ref 3.8–10.5)

## 2023-02-15 PROCEDURE — 99223 1ST HOSP IP/OBS HIGH 75: CPT

## 2023-02-15 RX ORDER — CALCIUM CARBONATE 500(1250)
1 TABLET ORAL
Refills: 0 | Status: DISCONTINUED | OUTPATIENT
Start: 2023-02-15 | End: 2023-02-18

## 2023-02-15 RX ORDER — MAGNESIUM OXIDE 400 MG ORAL TABLET 241.3 MG
400 TABLET ORAL DAILY
Refills: 0 | Status: DISCONTINUED | OUTPATIENT
Start: 2023-02-15 | End: 2023-02-15

## 2023-02-15 RX ORDER — ERGOCALCIFEROL 1.25 MG/1
50000 CAPSULE ORAL
Refills: 0 | Status: DISCONTINUED | OUTPATIENT
Start: 2023-02-15 | End: 2023-02-18

## 2023-02-15 RX ORDER — MAGNESIUM SULFATE 500 MG/ML
2 VIAL (ML) INJECTION ONCE
Refills: 0 | Status: COMPLETED | OUTPATIENT
Start: 2023-02-15 | End: 2023-02-15

## 2023-02-15 RX ORDER — INSULIN LISPRO 100/ML
6 VIAL (ML) SUBCUTANEOUS
Refills: 0 | Status: DISCONTINUED | OUTPATIENT
Start: 2023-02-15 | End: 2023-02-17

## 2023-02-15 RX ORDER — POLYETHYLENE GLYCOL 3350 17 G/17G
17 POWDER, FOR SOLUTION ORAL DAILY
Refills: 0 | Status: DISCONTINUED | OUTPATIENT
Start: 2023-02-15 | End: 2023-02-18

## 2023-02-15 RX ORDER — MAGNESIUM OXIDE 400 MG ORAL TABLET 241.3 MG
400 TABLET ORAL
Refills: 0 | Status: DISCONTINUED | OUTPATIENT
Start: 2023-02-15 | End: 2023-02-18

## 2023-02-15 RX ORDER — SODIUM,POTASSIUM PHOSPHATES 278-250MG
1 POWDER IN PACKET (EA) ORAL THREE TIMES A DAY
Refills: 0 | Status: COMPLETED | OUTPATIENT
Start: 2023-02-15 | End: 2023-02-18

## 2023-02-15 RX ADMIN — Medication 1 TABLET(S): at 18:24

## 2023-02-15 RX ADMIN — Medication 4: at 17:08

## 2023-02-15 RX ADMIN — CEFTRIAXONE 100 MILLIGRAM(S): 500 INJECTION, POWDER, FOR SOLUTION INTRAMUSCULAR; INTRAVENOUS at 17:10

## 2023-02-15 RX ADMIN — MAGNESIUM OXIDE 400 MG ORAL TABLET 400 MILLIGRAM(S): 241.3 TABLET ORAL at 11:53

## 2023-02-15 RX ADMIN — Medication 2: at 11:58

## 2023-02-15 RX ADMIN — Medication 25 GRAM(S): at 11:52

## 2023-02-15 RX ADMIN — Medication 1000 UNIT(S): at 11:57

## 2023-02-15 RX ADMIN — Medication 1 PACKET(S): at 21:06

## 2023-02-15 RX ADMIN — ERGOCALCIFEROL 50000 UNIT(S): 1.25 CAPSULE ORAL at 21:06

## 2023-02-15 RX ADMIN — Medication 1 PACKET(S): at 14:22

## 2023-02-15 RX ADMIN — INSULIN GLARGINE 10 UNIT(S): 100 INJECTION, SOLUTION SUBCUTANEOUS at 21:07

## 2023-02-15 RX ADMIN — POLYETHYLENE GLYCOL 3350 17 GRAM(S): 17 POWDER, FOR SOLUTION ORAL at 22:05

## 2023-02-15 RX ADMIN — Medication 100 MILLIGRAM(S): at 13:47

## 2023-02-15 RX ADMIN — Medication 6 UNIT(S): at 17:09

## 2023-02-15 RX ADMIN — MAGNESIUM OXIDE 400 MG ORAL TABLET 400 MILLIGRAM(S): 241.3 TABLET ORAL at 18:25

## 2023-02-15 NOTE — CHART NOTE - NSCHARTNOTEFT_GEN_A_CORE
Assessment:   67yMalePatient is a 67y old  Male who presents with a chief complaint of hyperosmolar hyperglycemic state (15 Feb 2023 14:55) Pt Visited. Pt Resting. Reports Ate Lunch . Po tolerated. S/P MRCP. D/W RN.  Endo consult Noted.       Factors impacting intake: [ ] none [ ] nausea  [ ] vomiting [ ] diarrhea [ ] constipation  [ ]chewing problems [ ] swallowing issues  [ ] other:     Diet Prescription: Diet, Consistent Carbohydrate/No Snacks (02-15-23 @ 11:22)    Intake: ~50 %     Current Weight: Weight (kg): 59.3 (02-10 @ 06:18)  % Weight Change    Pertinent Medications: MEDICATIONS  (STANDING):  allopurinol 100 milliGRAM(s) Oral daily  calcium carbonate   1250 mG (OsCal) 1 Tablet(s) Oral two times a day  cefTRIAXone   IVPB      cefTRIAXone   IVPB 1000 milliGRAM(s) IV Intermittent every 24 hours  cholecalciferol 1000 Unit(s) Oral daily  ergocalciferol 35961 Unit(s) Oral <User Schedule>  insulin glargine Injectable (LANTUS) 10 Unit(s) SubCutaneous at bedtime  insulin lispro (ADMELOG) corrective regimen sliding scale   SubCutaneous three times a day before meals  insulin lispro Injectable (ADMELOG) 6 Unit(s) SubCutaneous three times a day before meals  magnesium oxide 400 milliGRAM(s) Oral two times a day with meals  NIFEdipine XL 30 milliGRAM(s) Oral daily  potassium phosphate / sodium phosphate Powder (PHOS-NaK) 1 Packet(s) Oral three times a day  sodium chloride 0.45% with potassium chloride 20 mEq/L 1000 milliLiter(s) (80 mL/Hr) IV Continuous <Continuous>    MEDICATIONS  (PRN):    Pertinent Labs: 02-15 Na139 mmol/L Glu 165 mg/dL<H> K+ 3.8 mmol/L Cr  0.70 mg/dL BUN 17 mg/dL 02-15 Phos 2.1 mg/dL<L> 02-15 Alb 1.8 g/dL<L>     CAPILLARY BLOOD GLUCOSE      POCT Blood Glucose.: 155 mg/dL (15 Feb 2023 11:26)  POCT Blood Glucose.: 131 mg/dL (15 Feb 2023 07:57)  POCT Blood Glucose.: 124 mg/dL (14 Feb 2023 21:47)  POCT Blood Glucose.: 143 mg/dL (14 Feb 2023 16:36)    Skin:     Estimated Needs:   [ ] no change since previous assessment  [ ] recalculated:     Previous Nutrition Diagnosis:   [ ] Inadequate Energy Intake [ ]Inadequate Oral Intake [ ] Excessive Energy Intake   [ ] Underweight [ ] Increased Nutrient Needs [ ] Overweight/Obesity   [ ] Altered GI Function [ ] Unintended Weight Loss [ ] Food & Nutrition Related Knowledge Deficit [x ] Malnutrition Severe     Nutrition Diagnosis is [x ] ongoing  [ ] resolved [ ] not applicable     New Nutrition Diagnosis: [ ] not applicable       Interventions:   Recommend  [ ] Change Diet To:  [x] Nutrition Supplement  Glucerna shakes BID.   [ ] Nutrition Support  [ ] Other:     Monitoring and Evaluation:   [ ] PO intake [ x ] Tolerance to diet prescription [ x ] weights [ x ] labs[ x ] follow up per protocol  [ ] other:

## 2023-02-15 NOTE — CONSULT NOTE ADULT - ASSESSMENT
67y Male with Hx of HTN, uncontrolled Type 2 DM (Hba1c 9.1), HLD, CAD s/p CABG presented to [facility] ED on 2/10/23 with 5 days Hx malaise, generalized body aches, initially flu like symptoms, nausea, vomiting, 10-15 lb weight loss in a week and jaundice, and was admitted to MICU with HHS with severe hypokalemia, KYLE and jaundice / mostly direct hyperbilirubinemia but without obstruction on MRCP, and sepsis due to pneumonia (?).      Mostly direct hyperbilirubinemia, with mixed pattern mild liver enzyme elevation  - No obstruction on MRCP  - Sepsis, and cholestasis of sepsis likely contributes, especially since improving with sepsis and HHS management  - Follow up / obtain further AI workup. So far MARA 1:80, weak pos. Please, obtain AMA, SMA, LKM, Ig panel, ANCA.   - Consider sending complete Hep B serology, Hep E IgM/PCR.  - Ferritin, iron sat high, but less informative in setting of sepsis, would repeat once acute issues resolved, and if remains elevated, consider HFE mutation analysis  - C/w antibiotics per primary team. No BCx found. Consider repeat chest imaging. Had neg CXR on admission, but CT a/p suggested tree in bud opacities.   - Normal LVEF, RV function on TTE 2/13  - Avoid hepatotoxic medications.   - Given normal INR, continued improvement, monitor LFTs closely, but if no further improvement or worsening, might need liver biopsy    INCOMPLETE NOTE, FULL NOTE TO FOLLOW  67y Male with Hx of HTN, uncontrolled Type 2 DM (Hba1c 9.1), HLD, CAD s/p CABG presented to [facility] ED on 2/10/23 with 5 days Hx malaise, generalized body aches, initially flu like symptoms, nausea, vomiting, 10-15 lb weight loss in a week and jaundice, and was admitted to MICU with HHS with severe hypokalemia, KYLE and jaundice / mostly direct hyperbilirubinemia but without obstruction on MRCP, and sepsis due to pneumonia (?).      Mostly direct hyperbilirubinemia, with mixed pattern mild liver enzyme elevation  - No obstruction on MRCP  - Sepsis, and cholestasis of sepsis likely contributes, especially since improving with sepsis and HHS management  - Follow up / obtain further AI workup. So far MARA 1:80, weak pos. Please, obtain AMA, SMA, LKM, Ig panel, ANCA.   - Consider sending complete Hep B serology, Hep E IgM/PCR.  - Ferritin, iron sat high, but less informative in setting of sepsis, would repeat once acute issues resolved, and if remains elevated, consider HFE mutation analysis  - C/w antibiotics per primary team. No BCx found. Consider repeat chest imaging. Had neg CXR on admission, but CT a/p suggested tree in bud opacities.   - Normal LVEF, RV function on TTE 2/13  - Avoid hepatotoxic medications. Agree holding statin for now. Please, hold allopurinol.   - Given normal INR, continued improvement, monitor LFTs closely, but if no further improvement or worsening, might need liver biopsy    INCOMPLETE NOTE, FULL NOTE TO FOLLOW  67y Male with Hx of HTN, uncontrolled Type 2 DM (Hba1c 9.1), HLD, CAD s/p CABG presented to [facility] ED on 2/10/23 with 5 days Hx malaise, generalized body aches, initially flu like symptoms, nausea, vomiting, 10-15 lb weight loss in a week and jaundice, and was admitted to MICU with HHS with severe hypokalemia, KYLE and jaundice / mostly direct hyperbilirubinemia but without obstruction on MRCP, and sepsis due to pneumonia (?).      Mostly direct hyperbilirubinemia, with mixed pattern mild liver enzyme elevation  - No obstruction on MRCP  - Sepsis, and cholestasis of sepsis possibly contributes, especially since improving with sepsis and HHS management  - Follow up / obtain further AI workup. So far MARA 1:80, weak pos. Please, obtain AMA, SMA, LKM, Ig panel, ANCA.   - Consider sending complete Hep B serology ([place] is from endemic area), Hep E IgM/PCR, EBV and CMV PCRs.  - Ferritin, iron sat high, but less informative in setting of sepsis, would repeat once acute issues resolved, and if remains elevated, consider HFE mutation analysis  - C/w antibiotics per primary team. No BCx found. Consider repeat chest imaging. Had neg CXR on admission, but CT a/p suggested tree in bud opacities.   - Normal LVEF, RV function on TTE 2/13  - Avoid hepatotoxic medications. Agree holding statin for now. Please, hold allopurinol.   - Given normal INR, continued improvement, monitor LFTs closely, but if no further improvement or worsening, might need liver biopsy    Thank you for consult  Will continue to monitor  D/w primary team

## 2023-02-15 NOTE — PROGRESS NOTE ADULT - SUBJECTIVE AND OBJECTIVE BOX
C A R D I O L O G Y  **********************************    DATE OF SERVICE: 02-15-23    Patient denies chest pain or shortness of breath.   Review of symptoms otherwise negative.    allopurinol 100 milliGRAM(s) Oral daily  calcium carbonate   1250 mG (OsCal) 1 Tablet(s) Oral two times a day  cefTRIAXone   IVPB      cefTRIAXone   IVPB 1000 milliGRAM(s) IV Intermittent every 24 hours  cholecalciferol 1000 Unit(s) Oral daily  ergocalciferol 72681 Unit(s) Oral <User Schedule>  insulin glargine Injectable (LANTUS) 10 Unit(s) SubCutaneous at bedtime  insulin lispro (ADMELOG) corrective regimen sliding scale   SubCutaneous three times a day before meals  insulin lispro Injectable (ADMELOG) 6 Unit(s) SubCutaneous three times a day before meals  magnesium oxide 400 milliGRAM(s) Oral two times a day with meals  NIFEdipine XL 30 milliGRAM(s) Oral daily  potassium phosphate / sodium phosphate Powder (PHOS-NaK) 1 Packet(s) Oral three times a day  sodium chloride 0.45% with potassium chloride 20 mEq/L 1000 milliLiter(s) IV Continuous <Continuous>                            13.1   10.49 )-----------( 134      ( 15 Feb 2023 06:58 )             38.7       Hemoglobin: 13.1 g/dL (02-15 @ 06:58)  Hemoglobin: 13.4 g/dL (02-14 @ 06:01)  Hemoglobin: 13.2 g/dL (02-13 @ 06:03)  Hemoglobin: 13.5 g/dL (02-12 @ 07:16)  Hemoglobin: 14.0 g/dL (02-11 @ 03:46)      02-15    139  |  106  |  17  ----------------------------<  165<H>  3.8   |  24  |  0.70    Ca    6.4<LL>      15 Feb 2023 06:58  Phos  2.1     02-15  Mg     1.4     02-15    TPro  5.1<L>  /  Alb  1.8<L>  /  TBili  7.0<H>  /  DBili  x   /  AST  101<H>  /  ALT  114<H>  /  AlkPhos  164<H>  02-15    Creatinine Trend: 0.70<--, 0.73<--, 0.99<--, 1.36<--, 1.32<--, 1.49<--    COAGS:           T(C): 35.9 (02-15-23 @ 12:20), Max: 36.9 (02-14-23 @ 21:39)  HR: 76 (02-15-23 @ 12:20) (71 - 76)  BP: 126/72 (02-15-23 @ 12:20) (118/68 - 128/72)  RR: 17 (02-15-23 @ 12:20) (17 - 18)  SpO2: 97% (02-15-23 @ 12:20) (97% - 100%)  Wt(kg): --    I&O's Summary    14 Feb 2023 07:01  -  15 Feb 2023 07:00  --------------------------------------------------------  IN: 0 mL / OUT: 460 mL / NET: -460 mL    15 Feb 2023 07:01  -  15 Feb 2023 16:00  --------------------------------------------------------  IN: 0 mL / OUT: 650 mL / NET: -650 mL        HEENT:  (-)icterus (-)pallor  CV: N S1 S2 1/6 DARIEN (+)2 Pulses B/l  Resp:  Clear to ausculatation B/L, normal effort  GI: (+) BS Soft, NT, ND  Lymph:  (-)Edema, (-)obvious lymphadenopathy  Skin: Warm to touch, Normal turgor  Psych: Appropriate mood and affect          ASSESSMENT/PLAN: 	67y  Male   PMH CAD s/p CABG, DM, HLD coming in with jaundice and generalized body aches.    #CAD  - ideally should be on an ASA and statin once plts and LFTs are stable  - echo with no pertinent findings      # ? PAF  - xarelto on hold  - Obtain outpt records    # HTN  - BP currently acceptable  - Monitor For now    # Jaundice  - GI f/u  - MRCP noted    Arben Bhardwaj MD, [facility]  BEEPER [phone]

## 2023-02-15 NOTE — PROGRESS NOTE ADULT - SUBJECTIVE AND OBJECTIVE BOX
Interval Events: No acute events overnight. Patient is status post MRCP yesterday that showed periampullary duodenal diverticulum and liver, gallbladder, pancreas and adrenals all within normal limits. Patient seen and examined at bedside. Eating well still on clears, no acute complaints. Endorsing fatigue.        Allergies    No Known Allergies    Intolerances      Endocrine/Metabolic Medications:  allopurinol 100 milliGRAM(s) Oral daily  insulin glargine Injectable (LANTUS) 10 Unit(s) SubCutaneous at bedtime  insulin lispro (ADMELOG) corrective regimen sliding scale   SubCutaneous three times a day before meals  insulin lispro Injectable (ADMELOG) 8 Unit(s) SubCutaneous three times a day before meals      Vital Signs Last 24 Hrs  T(C): 36.8 (15 Feb 2023 04:45), Max: 36.9 (14 Feb 2023 14:37)  T(F): 98.3 (15 Feb 2023 04:45), Max: 98.4 (14 Feb 2023 14:37)  HR: 71 (15 Feb 2023 04:45) (71 - 80)  BP: 128/72 (15 Feb 2023 04:45) (118/68 - 131/72)  BP(mean): --  RR: 18 (15 Feb 2023 04:45) (17 - 18)  SpO2: 100% (15 Feb 2023 04:45) (95% - 100%)    Parameters below as of 15 Feb 2023 04:45  Patient On (Oxygen Delivery Method): room air          PHYSICAL EXAM  All physical exam findings normal, except those marked:  General:	Alert, active, cooperative, NAD, well hydrated  .		[] Abnormal:  Neck		Normal: supple, no cervical adenopathy, no palpable thyroid  .		[] Abnormal:  Cardiovascular	Normal: regular rate, normal S1, S2, no murmurs  .		[] Abnormal:  Respiratory	Normal: no chest wall deformity, normal respiratory pattern, CTA B/L  .		[] Abnormal:  Abdominal	Normal: soft, ND, NT, bowel sounds present, no masses, no organomegaly  .		[] Abnormal:  		Normal normal genitalia, testes descended, circumcised/uncircumcised  .		Yina stage:			Breast yina:  .		Menstrual history:  .		[] Abnormal:  Extremities	Normal: FROM x4  .		[] Abnormal:  Skin		Normal: intact and not indurated, no rash, no acanthosis nigricans  .		[x] Abnormal: Jaundice  Neurologic	Normal: grossly intact  .		[] Abnormal:    LABS                        13.1   10.49 )-----------( 134      ( 15 Feb 2023 06:58 )             38.7                               139    |  106    |  17                  Calcium: 6.4   / iCa: x      (02-15 @ 06:58)    ----------------------------<  165       Magnesium: 1.4                              3.8     |  24     |  0.70             Phosphorous: 2.1      TPro  5.1    /  Alb  1.8    /  TBili  7.0    /  DBili  x      /  AST  101    /  ALT  114    /  AlkPhos  164    15 Feb 2023 06:58    CAPILLARY BLOOD GLUCOSE      POCT Blood Glucose.: 131 mg/dL (15 Feb 2023 07:57)  POCT Blood Glucose.: 124 mg/dL (14 Feb 2023 21:47)  POCT Blood Glucose.: 143 mg/dL (14 Feb 2023 16:36)  POCT Blood Glucose.: 133 mg/dL (14 Feb 2023 12:31)        Assesment/plan    #DM  better controlled  -continue with current Lantus 26U qhs  -Decrease pre-meal lispro from 8U to 6U   -would rec out pt insulin rx  -fsg ac and hs  -dm teaching    #Hypocalcemia  -Today Ca2+ 6.4 (corrected 8.1), PTH elevated 194, 25-hydroxy VitD decreased 16.5  -Likely contributed by hypomagnesemia (1.4) and low vit d  -give another MgSO4 2g IV   -start PO Magnesium oxide 400mg TID with meals for 3-4 days  -c/w VitD3 2000U qd   -start VitD3 50,000U qweek    d/w NP       Interval Events: No acute events overnight. Patient is status post MRCP yesterday that showed periampullary duodenal diverticulum and liver, gallbladder, pancreas and adrenals all within normal limits. Patient seen and examined at bedside. Eating well still on clears, no acute complaints. Endorsing fatigue.        Allergies    No Known Allergies    Intolerances      Endocrine/Metabolic Medications:  allopurinol 100 milliGRAM(s) Oral daily  insulin glargine Injectable (LANTUS) 10 Unit(s) SubCutaneous at bedtime  insulin lispro (ADMELOG) corrective regimen sliding scale   SubCutaneous three times a day before meals  insulin lispro Injectable (ADMELOG) 8 Unit(s) SubCutaneous three times a day before meals      Vital Signs Last 24 Hrs  T(C): 36.8 (15 Feb 2023 04:45), Max: 36.9 (14 Feb 2023 14:37)  T(F): 98.3 (15 Feb 2023 04:45), Max: 98.4 (14 Feb 2023 14:37)  HR: 71 (15 Feb 2023 04:45) (71 - 80)  BP: 128/72 (15 Feb 2023 04:45) (118/68 - 131/72)  BP(mean): --  RR: 18 (15 Feb 2023 04:45) (17 - 18)  SpO2: 100% (15 Feb 2023 04:45) (95% - 100%)    Parameters below as of 15 Feb 2023 04:45  Patient On (Oxygen Delivery Method): room air          PHYSICAL EXAM  All physical exam findings normal, except those marked:  General:	Alert, active, cooperative, NAD, well hydrated  .		[] Abnormal:  Neck		Normal: supple, no cervical adenopathy, no palpable thyroid  .		[] Abnormal:  Cardiovascular	Normal: regular rate, normal S1, S2, no murmurs  .		[] Abnormal:  Respiratory	Normal: no chest wall deformity, normal respiratory pattern, CTA B/L  .		[] Abnormal:  Abdominal	Normal: soft, ND, NT, bowel sounds present, no masses, no organomegaly  .		[] Abnormal:  		Normal normal genitalia, testes descended, circumcised/uncircumcised  .		Yina stage:			Breast yina:  .		Menstrual history:  .		[] Abnormal:  Extremities	Normal: FROM x4  .		[] Abnormal:  Skin		Normal: intact and not indurated, no rash, no acanthosis nigricans  .		[x] Abnormal: Jaundice  Neurologic	Normal: grossly intact  .		[] Abnormal:    LABS                        13.1   10.49 )-----------( 134      ( 15 Feb 2023 06:58 )             38.7                               139    |  106    |  17                  Calcium: 6.4   / iCa: x      (02-15 @ 06:58)    ----------------------------<  165       Magnesium: 1.4                              3.8     |  24     |  0.70             Phosphorous: 2.1      TPro  5.1    /  Alb  1.8    /  TBili  7.0    /  DBili  x      /  AST  101    /  ALT  114    /  AlkPhos  164    15 Feb 2023 06:58    CAPILLARY BLOOD GLUCOSE      POCT Blood Glucose.: 131 mg/dL (15 Feb 2023 07:57)  POCT Blood Glucose.: 124 mg/dL (14 Feb 2023 21:47)  POCT Blood Glucose.: 143 mg/dL (14 Feb 2023 16:36)  POCT Blood Glucose.: 133 mg/dL (14 Feb 2023 12:31)        Assesment/plan    Patient states he was diagnosed with diabetes 7-8 years ago and has never been on insulin. Reports taking medications (jardiance and janumet) regularly. Per daughter when the patient started feeling sick around a week before admission he was not taking his medications but was still eating when he could despite poor appetite. At that time the patient's finger sticks at home were noted to be higher than usual. She noticed the patient's skin beginning to turn yellow and reports that the patient was urinating frequently with very yellow and "bubbly" urine. Just prior to coming to the hospital the patient had increased thirst and very dry mouth that made it difficult for him to swallow. Daughter says that she spoke with patient's PCP recently who said that the patient's blood sugars had been well controlled and his last A1c outpatient was 7. Patient eats a regular diet consisting of meat, fish, vegetables and rice and generally avoids sugary foods and sodas. Denies any recent changes to diet. Patient usually walks and does light exercise in the park every morning. Patient says that he has seen ophthalmologist and podiatrist a very long time ago but daughter is unaware if this is true. Development of HHS possibly contributed to by medication non-compliance in setting of recent flu like illness, however CT findings showing cystic lesion in the body of pancreas and increased direct hyperbilirubinemia concerning for a direct process affecting pancreatic function. Noted to have significant hypocalcemia, hypomagnesemia, and vitamin D deficiency.      #DM  better controlled  -continue with current Lantus 26U qhs  -Decrease pre-meal lispro from 8U to 6U   -would rec out pt insulin rx  -fsg ac and hs  -dm teaching    #Hypocalcemia  -Today Ca2+ 6.4 (corrected 8.1), PTH elevated 194, 25-hydroxy VitD decreased 16.5  -Likely contributed by hypomagnesemia (1.4) and low vit d  -give another MgSO4 2g IV   -start PO Magnesium oxide 400mg TID with meals for 3-4 days  -c/w VitD3 2000U qd   -start VitD3 50,000U qweek    d/w NP       Interval Events: No acute events overnight. Patient is status post MRCP yesterday that showed periampullary duodenal diverticulum and liver, gallbladder, pancreas and adrenals all within normal limits. Patient seen and examined at bedside. Eating well still on clears, no acute complaints. Endorsing fatigue.        Allergies    No Known Allergies    Intolerances      Endocrine/Metabolic Medications:  allopurinol 100 milliGRAM(s) Oral daily  insulin glargine Injectable (LANTUS) 10 Unit(s) SubCutaneous at bedtime  insulin lispro (ADMELOG) corrective regimen sliding scale   SubCutaneous three times a day before meals  insulin lispro Injectable (ADMELOG) 8 Unit(s) SubCutaneous three times a day before meals      Vital Signs Last 24 Hrs  T(C): 36.8 (15 Feb 2023 04:45), Max: 36.9 (14 Feb 2023 14:37)  T(F): 98.3 (15 Feb 2023 04:45), Max: 98.4 (14 Feb 2023 14:37)  HR: 71 (15 Feb 2023 04:45) (71 - 80)  BP: 128/72 (15 Feb 2023 04:45) (118/68 - 131/72)  BP(mean): --  RR: 18 (15 Feb 2023 04:45) (17 - 18)  SpO2: 100% (15 Feb 2023 04:45) (95% - 100%)    Parameters below as of 15 Feb 2023 04:45  Patient On (Oxygen Delivery Method): room air          PHYSICAL EXAM  All physical exam findings normal, except those marked:  General:	Alert, active, cooperative, NAD, well hydrated  .		[] Abnormal:  Neck		Normal: supple, no cervical adenopathy, no palpable thyroid  .		[] Abnormal:  Cardiovascular	Normal: regular rate, normal S1, S2, no murmurs  .		[] Abnormal:  Respiratory	Normal: no chest wall deformity, normal respiratory pattern, CTA B/L  .		[] Abnormal:  Abdominal	Normal: soft, ND, NT, bowel sounds present, no masses, no organomegaly  .		[] Abnormal:  		Normal normal genitalia, testes descended, circumcised/uncircumcised  .		Yina stage:			Breast yina:  .		Menstrual history:  .		[] Abnormal:  Extremities	Normal: FROM x4  .		[] Abnormal:  Skin		Normal: intact and not indurated, no rash, no acanthosis nigricans  .		[x] Abnormal: Jaundice  Neurologic	Normal: grossly intact  .		[] Abnormal:    LABS                        13.1   10.49 )-----------( 134      ( 15 Feb 2023 06:58 )             38.7                               139    |  106    |  17                  Calcium: 6.4   / iCa: x      (02-15 @ 06:58)    ----------------------------<  165       Magnesium: 1.4                              3.8     |  24     |  0.70             Phosphorous: 2.1      TPro  5.1    /  Alb  1.8    /  TBili  7.0    /  DBili  x      /  AST  101    /  ALT  114    /  AlkPhos  164    15 Feb 2023 06:58    CAPILLARY BLOOD GLUCOSE      POCT Blood Glucose.: 131 mg/dL (15 Feb 2023 07:57)  POCT Blood Glucose.: 124 mg/dL (14 Feb 2023 21:47)  POCT Blood Glucose.: 143 mg/dL (14 Feb 2023 16:36)  POCT Blood Glucose.: 133 mg/dL (14 Feb 2023 12:31)        Assesment/plan    Patient states he was diagnosed with diabetes 7-8 years ago and has never been on insulin. Reports taking medications (jardiance and janumet) regularly. Per daughter when the patient started feeling sick around a week before admission he was not taking his medications but was still eating when he could despite poor appetite. At that time the patient's finger sticks at home were noted to be higher than usual. She noticed the patient's skin beginning to turn yellow and reports that the patient was urinating frequently with very yellow and "bubbly" urine. Just prior to coming to the hospital the patient had increased thirst and very dry mouth that made it difficult for him to swallow. Daughter says that she spoke with patient's PCP recently who said that the patient's blood sugars had been well controlled and his last A1c outpatient was 7. Patient eats a regular diet consisting of meat, fish, vegetables and rice and generally avoids sugary foods and sodas. Denies any recent changes to diet. Patient usually walks and does light exercise in the park every morning. Patient says that he has seen ophthalmologist and podiatrist a very long time ago but daughter is unaware if this is true. Development of HHS possibly contributed to by medication non-compliance in setting of recent flu like illness, however CT findings showing cystic lesion in the body of pancreas and increased direct hyperbilirubinemia concerning for a direct process affecting pancreatic function. Noted to have significant hypocalcemia, hypomagnesemia, and vitamin D deficiency.      #DM  better controlled  -continue with current Lantus 26U qhs  -Decrease pre-meal lispro from 8U to 6U   -would rec out pt insulin rx  -fsg ac and hs  -dm teaching    #Hypocalcemia  -Today Ca2+ 6.4 (corrected 8.1), PTH elevated 194, 25-hydroxy VitD decreased 16.5  -Likely contributed by hypomagnesemia (1.4) and low vit d  -give another MgSO4 2g IV   -start PO Magnesium oxide 400mg TID with meals for 3-4 days  -c/w VitD3 1000U qd   -start VitD3 50,000U qweek    d/w NP       Interval Events: No acute events overnight. Patient is status post MRCP yesterday that showed periampullary duodenal diverticulum and liver, gallbladder, pancreas and adrenals all within normal limits. Patient seen and examined at bedside. Eating well still on clears, no acute complaints. Endorsing fatigue.        Allergies    No Known Allergies    Intolerances      Endocrine/Metabolic Medications:  allopurinol 100 milliGRAM(s) Oral daily  insulin glargine Injectable (LANTUS) 10 Unit(s) SubCutaneous at bedtime  insulin lispro (ADMELOG) corrective regimen sliding scale   SubCutaneous three times a day before meals  insulin lispro Injectable (ADMELOG) 8 Unit(s) SubCutaneous three times a day before meals      Vital Signs Last 24 Hrs  T(C): 36.8 (15 Feb 2023 04:45), Max: 36.9 (14 Feb 2023 14:37)  T(F): 98.3 (15 Feb 2023 04:45), Max: 98.4 (14 Feb 2023 14:37)  HR: 71 (15 Feb 2023 04:45) (71 - 80)  BP: 128/72 (15 Feb 2023 04:45) (118/68 - 131/72)  BP(mean): --  RR: 18 (15 Feb 2023 04:45) (17 - 18)  SpO2: 100% (15 Feb 2023 04:45) (95% - 100%)    Parameters below as of 15 Feb 2023 04:45  Patient On (Oxygen Delivery Method): room air          PHYSICAL EXAM  All physical exam findings normal, except those marked:  General:	Alert, active, cooperative, NAD, well hydrated  .		[] Abnormal:  Neck		Normal: supple, no cervical adenopathy, no palpable thyroid  .		[] Abnormal:  Cardiovascular	Normal: regular rate, normal S1, S2, no murmurs  .		[] Abnormal:  Respiratory	Normal: no chest wall deformity, normal respiratory pattern, CTA B/L  .		[] Abnormal:  Abdominal	Normal: soft, ND, NT, bowel sounds present, no masses, no organomegaly  .		[] Abnormal:  		Normal normal genitalia, testes descended, circumcised/uncircumcised  .		Yina stage:			Breast yina:  .		Menstrual history:  .		[] Abnormal:  Extremities	Normal: FROM x4  .		[] Abnormal:  Skin		Normal: intact and not indurated, no rash, no acanthosis nigricans  .		[x] Abnormal: Jaundice  Neurologic	Normal: grossly intact  .		[] Abnormal:    LABS                        13.1   10.49 )-----------( 134      ( 15 Feb 2023 06:58 )             38.7                               139    |  106    |  17                  Calcium: 6.4   / iCa: x      (02-15 @ 06:58)    ----------------------------<  165       Magnesium: 1.4                              3.8     |  24     |  0.70             Phosphorous: 2.1      TPro  5.1    /  Alb  1.8    /  TBili  7.0    /  DBili  x      /  AST  101    /  ALT  114    /  AlkPhos  164    15 Feb 2023 06:58    CAPILLARY BLOOD GLUCOSE      POCT Blood Glucose.: 131 mg/dL (15 Feb 2023 07:57)  POCT Blood Glucose.: 124 mg/dL (14 Feb 2023 21:47)  POCT Blood Glucose.: 143 mg/dL (14 Feb 2023 16:36)  POCT Blood Glucose.: 133 mg/dL (14 Feb 2023 12:31)        Assesment/plan    Patient states he was diagnosed with diabetes 7-8 years ago and has never been on insulin. Reports taking medications (jardiance and janumet) regularly. Per daughter when the patient started feeling sick around a week before admission he was not taking his medications but was still eating when he could despite poor appetite. At that time the patient's finger sticks at home were noted to be higher than usual. She noticed the patient's skin beginning to turn yellow and reports that the patient was urinating frequently with very yellow and "bubbly" urine. Just prior to coming to the hospital the patient had increased thirst and very dry mouth that made it difficult for him to swallow. Daughter says that she spoke with patient's PCP recently who said that the patient's blood sugars had been well controlled and his last A1c outpatient was 7. Patient eats a regular diet consisting of meat, fish, vegetables and rice and generally avoids sugary foods and sodas. Denies any recent changes to diet. Patient usually walks and does light exercise in the park every morning. Patient says that he has seen ophthalmologist and podiatrist a very long time ago but daughter is unaware if this is true. Development of HHS possibly contributed to by medication non-compliance in setting of recent flu like illness, however CT findings showing cystic lesion in the body of pancreas and increased direct hyperbilirubinemia concerning for a direct process affecting pancreatic function. Noted to have significant hypocalcemia, hypomagnesemia, and vitamin D deficiency.      #DM  better controlled  -continue with current Lantus 26U qhs  -Decrease pre-meal lispro from 8U to 6U   -would rec out pt insulin rx  -fsg ac and hs  -dm teaching    #Hypocalcemia  -Today Ca2+ 6.4 (corrected 8.1), PTH elevated 194, 25-hydroxy VitD decreased 16.5  -Likely contributed by hypomagnesemia (1.4) and low vit d  -give another MgSO4 2g IV   -start PO Magnesium oxide 400mg TID with meals for 3-4 days  -c/w VitD3 1000U qd   -start VitD3 50,000U qweek for 12 weeks    d/w NP       Interval Events: No acute events overnight. Patient is status post MRCP yesterday that showed periampullary duodenal diverticulum and liver, gallbladder, pancreas and adrenals all within normal limits. Patient seen and examined at bedside. Eating well still on clears, no acute complaints. Endorsing fatigue.        Allergies    No Known Allergies    Intolerances      Endocrine/Metabolic Medications:  allopurinol 100 milliGRAM(s) Oral daily  insulin glargine Injectable (LANTUS) 10 Unit(s) SubCutaneous at bedtime  insulin lispro (ADMELOG) corrective regimen sliding scale   SubCutaneous three times a day before meals  insulin lispro Injectable (ADMELOG) 8 Unit(s) SubCutaneous three times a day before meals      Vital Signs Last 24 Hrs  T(C): 36.8 (15 Feb 2023 04:45), Max: 36.9 (14 Feb 2023 14:37)  T(F): 98.3 (15 Feb 2023 04:45), Max: 98.4 (14 Feb 2023 14:37)  HR: 71 (15 Feb 2023 04:45) (71 - 80)  BP: 128/72 (15 Feb 2023 04:45) (118/68 - 131/72)  BP(mean): --  RR: 18 (15 Feb 2023 04:45) (17 - 18)  SpO2: 100% (15 Feb 2023 04:45) (95% - 100%)    Parameters below as of 15 Feb 2023 04:45  Patient On (Oxygen Delivery Method): room air          PHYSICAL EXAM  All physical exam findings normal, except those marked:  General:	Alert, active, cooperative, NAD, well hydrated  .		[] Abnormal:  Neck		Normal: supple, no cervical adenopathy, no palpable thyroid  .		[] Abnormal:  Cardiovascular	Normal: regular rate, normal S1, S2, no murmurs  .		[] Abnormal:  Respiratory	Normal: no chest wall deformity, normal respiratory pattern, CTA B/L  .		[] Abnormal:  Abdominal	Normal: soft, ND, NT, bowel sounds present, no masses, no organomegaly  .		[] Abnormal:  		Normal normal genitalia, testes descended, circumcised/uncircumcised  .		Yina stage:			Breast yina:  .		Menstrual history:  .		[] Abnormal:  Extremities	Normal: FROM x4  .		[] Abnormal:  Skin		Normal: intact and not indurated, no rash, no acanthosis nigricans  .		[x] Abnormal: Jaundice  Neurologic	Normal: grossly intact  .		[] Abnormal:    LABS                        13.1   10.49 )-----------( 134      ( 15 Feb 2023 06:58 )             38.7                               139    |  106    |  17                  Calcium: 6.4   / iCa: x      (02-15 @ 06:58)    ----------------------------<  165       Magnesium: 1.4                              3.8     |  24     |  0.70             Phosphorous: 2.1      TPro  5.1    /  Alb  1.8    /  TBili  7.0    /  DBili  x      /  AST  101    /  ALT  114    /  AlkPhos  164    15 Feb 2023 06:58    CAPILLARY BLOOD GLUCOSE      POCT Blood Glucose.: 131 mg/dL (15 Feb 2023 07:57)  POCT Blood Glucose.: 124 mg/dL (14 Feb 2023 21:47)  POCT Blood Glucose.: 143 mg/dL (14 Feb 2023 16:36)  POCT Blood Glucose.: 133 mg/dL (14 Feb 2023 12:31)        Assesment/plan    Patient states he was diagnosed with diabetes 7-8 years ago and has never been on insulin. Reports taking medications (jardiance and janumet) regularly. Per daughter when the patient started feeling sick around a week before admission he was not taking his medications but was still eating when he could despite poor appetite. At that time the patient's finger sticks at home were noted to be higher than usual. She noticed the patient's skin beginning to turn yellow and reports that the patient was urinating frequently with very yellow and "bubbly" urine. Just prior to coming to the hospital the patient had increased thirst and very dry mouth that made it difficult for him to swallow. Daughter says that she spoke with patient's PCP recently who said that the patient's blood sugars had been well controlled and his last A1c outpatient was 7. Patient eats a regular diet consisting of meat, fish, vegetables and rice and generally avoids sugary foods and sodas. Denies any recent changes to diet. Patient usually walks and does light exercise in the park every morning. Patient says that he has seen ophthalmologist and podiatrist a very long time ago but daughter is unaware if this is true. Development of HHS possibly contributed to by medication non-compliance in setting of recent flu like illness, however CT findings showing cystic lesion in the body of pancreas and increased direct hyperbilirubinemia concerning for a direct process affecting pancreatic function. Noted to have significant hypocalcemia, hypomagnesemia, and vitamin D deficiency.      #DM  better controlled  -continue with current Lantus 26U qhs  -Decrease pre-meal lispro from 8U to 6U   -would rec out pt insulin rx  -fsg ac and hs  -dm teaching    #Hypocalcemia  -Today Ca2+ 6.4 (corrected 8.1), PTH elevated 194, 25-hydroxy VitD decreased 16.5  -Likely contributed by hypomagnesemia (1.4) and low vit d  -give another MgSO4 2g IV   -start PO Magnesium oxide 400mg TID with meals for 3-4 days  -c/w VitD3 1000U qd   -start VitD3 50,000U qweek for 12 weeks    d/w NP.

## 2023-02-15 NOTE — PROGRESS NOTE ADULT - SUBJECTIVE AND OBJECTIVE BOX
NP Note discussed with  primary attending    Patient is a 67y old  Male who presents with a chief complaint of hyperosmolar hyperglycemic state (15 Feb 2023 15:59)      INTERVAL HPI/OVERNIGHT EVENTS: no new complaints    MEDICATIONS  (STANDING):  allopurinol 100 milliGRAM(s) Oral daily  calcium carbonate   1250 mG (OsCal) 1 Tablet(s) Oral two times a day  cefTRIAXone   IVPB      cefTRIAXone   IVPB 1000 milliGRAM(s) IV Intermittent every 24 hours  cholecalciferol 1000 Unit(s) Oral daily  ergocalciferol 14948 Unit(s) Oral <User Schedule>  insulin glargine Injectable (LANTUS) 10 Unit(s) SubCutaneous at bedtime  insulin lispro (ADMELOG) corrective regimen sliding scale   SubCutaneous three times a day before meals  insulin lispro Injectable (ADMELOG) 6 Unit(s) SubCutaneous three times a day before meals  magnesium oxide 400 milliGRAM(s) Oral two times a day with meals  NIFEdipine XL 30 milliGRAM(s) Oral daily  potassium phosphate / sodium phosphate Powder (PHOS-NaK) 1 Packet(s) Oral three times a day  sodium chloride 0.45% with potassium chloride 20 mEq/L 1000 milliLiter(s) (80 mL/Hr) IV Continuous <Continuous>    MEDICATIONS  (PRN):      __________________________________________________  REVIEW OF SYSTEMS:    CONSTITUTIONAL: No fever,   RESPIRATORY: No cough; No shortness of breath  CARDIOVASCULAR: No chest pain, no palpitations  GASTROINTESTINAL: No pain. No nausea or vomiting; No diarrhea   NEUROLOGICAL: No headache or numbness, no tremors  MUSCULOSKELETAL: No joint pain, no muscle pain  GENITOURINARY: no dysuria, no frequency, no hesitancy        Vital Signs Last 24 Hrs  T(C): 35.9 (15 Feb 2023 12:20), Max: 36.9 (14 Feb 2023 21:39)  T(F): 96.7 (15 Feb 2023 12:20), Max: 98.4 (14 Feb 2023 21:39)  HR: 76 (15 Feb 2023 12:20) (71 - 76)  BP: 126/72 (15 Feb 2023 12:20) (118/68 - 128/72)  BP(mean): --  RR: 17 (15 Feb 2023 12:20) (17 - 18)  SpO2: 97% (15 Feb 2023 12:20) (97% - 100%)    Parameters below as of 15 Feb 2023 12:20  Patient On (Oxygen Delivery Method): room air        ________________________________________________  PHYSICAL EXAM:  GENERAL: NAD  CHEST/LUNG: Clear to ausculitation bilaterally   HEART: S1 S2  regular;  ABDOMEN: Soft, Nontender, Nondistended; Bowel sounds present  EXTREMITIES: no cyanosis; no edema; no calf tenderness  SKIN: warm and dry; + generalized juandice   NERVOUS SYSTEM:  Awake and alert; Oriented  to place, person and time ; no new deficits    _________________________________________________  LABS:                        13.1   10.49 )-----------( 134      ( 15 Feb 2023 06:58 )             38.7     02-15    139  |  106  |  17  ----------------------------<  165<H>  3.8   |  24  |  0.70    Ca    6.4<LL>      15 Feb 2023 06:58  Phos  2.1     02-15  Mg     1.4     02-15    TPro  5.1<L>  /  Alb  1.8<L>  /  TBili  7.0<H>  /  DBili  x   /  AST  101<H>  /  ALT  114<H>  /  AlkPhos  164<H>  02-15        CAPILLARY BLOOD GLUCOSE      POCT Blood Glucose.: 155 mg/dL (15 Feb 2023 11:26)  POCT Blood Glucose.: 131 mg/dL (15 Feb 2023 07:57)  POCT Blood Glucose.: 124 mg/dL (14 Feb 2023 21:47)  POCT Blood Glucose.: 143 mg/dL (14 Feb 2023 16:36)        RADIOLOGY & ADDITIONAL TESTS:    Imaging Personally Reviewed:  YES/NO    Consultant(s) Notes Reviewed:   YES/ No    Care Discussed with Consultants :     Plan of care was discussed with patient and /or primary care giver; all questions and concerns were addressed and care was aligned with patient's wishes.

## 2023-02-15 NOTE — PROGRESS NOTE ADULT - SUBJECTIVE AND OBJECTIVE BOX
Patient is a 67y old  Male who presents with a chief complaint of hyperosmolar hyperglycemic state (15 Feb 2023 09:28)    PATIENT IS SEEN AND EXAMINED IN MEDICAL FLOOR.  LUISAT [    ]    SHERMAN [   ]      GT [   ]    ALLERGIES:  No Known Allergies      Daily     Daily     VITALS:    Vital Signs Last 24 Hrs  T(C): 36.8 (15 Feb 2023 04:45), Max: 36.9 (14 Feb 2023 14:37)  T(F): 98.3 (15 Feb 2023 04:45), Max: 98.4 (14 Feb 2023 14:37)  HR: 71 (15 Feb 2023 04:45) (71 - 80)  BP: 128/72 (15 Feb 2023 04:45) (118/68 - 131/72)  BP(mean): --  RR: 18 (15 Feb 2023 04:45) (17 - 18)  SpO2: 100% (15 Feb 2023 04:45) (95% - 100%)    Parameters below as of 15 Feb 2023 04:45  Patient On (Oxygen Delivery Method): room air        LABS:    CBC Full  -  ( 15 Feb 2023 06:58 )  WBC Count : 10.49 K/uL  RBC Count : 4.19 M/uL  Hemoglobin : 13.1 g/dL  Hematocrit : 38.7 %  Platelet Count - Automated : 134 K/uL  Mean Cell Volume : 92.4 fl  Mean Cell Hemoglobin : 31.3 pg  Mean Cell Hemoglobin Concentration : 33.9 gm/dL  Auto Neutrophil # : x  Auto Lymphocyte # : x  Auto Monocyte # : x  Auto Eosinophil # : x  Auto Basophil # : x  Auto Neutrophil % : x  Auto Lymphocyte % : x  Auto Monocyte % : x  Auto Eosinophil % : x  Auto Basophil % : x      02-15    139  |  106  |  17  ----------------------------<  165<H>  3.8   |  24  |  0.70    Ca    6.4<LL>      15 Feb 2023 06:58  Phos  2.1     02-15  Mg     1.4     02-15    TPro  5.1<L>  /  Alb  1.8<L>  /  TBili  7.0<H>  /  DBili  x   /  AST  101<H>  /  ALT  114<H>  /  AlkPhos  164<H>  02-15    CAPILLARY BLOOD GLUCOSE      POCT Blood Glucose.: 131 mg/dL (15 Feb 2023 07:57)  POCT Blood Glucose.: 124 mg/dL (14 Feb 2023 21:47)  POCT Blood Glucose.: 143 mg/dL (14 Feb 2023 16:36)  POCT Blood Glucose.: 133 mg/dL (14 Feb 2023 12:31)        LIVER FUNCTIONS - ( 15 Feb 2023 06:58 )  Alb: 1.8 g/dL / Pro: 5.1 g/dL / ALK PHOS: 164 U/L / ALT: 114 U/L DA / AST: 101 U/L / GGT: x           Creatinine Trend: 0.70<--, 0.73<--, 0.99<--, 1.36<--, 1.32<--, 1.49<--  I&O's Summary    14 Feb 2023 07:01  -  15 Feb 2023 07:00  --------------------------------------------------------  IN: 0 mL / OUT: 460 mL / NET: -460 mL            Clean Catch Clean Catch (Midstream)  02-10 @ 02:35   <10,000 CFU/mL Normal Urogenital Nerissa  --  --          MEDICATIONS:    MEDICATIONS  (STANDING):  allopurinol 100 milliGRAM(s) Oral daily  calcitriol   Capsule 0.25 MICROGram(s) Oral daily  calcium carbonate   1250 mG (OsCal) 1 Tablet(s) Oral daily  cefTRIAXone   IVPB      cefTRIAXone   IVPB 1000 milliGRAM(s) IV Intermittent every 24 hours  cholecalciferol 1000 Unit(s) Oral daily  insulin glargine Injectable (LANTUS) 10 Unit(s) SubCutaneous at bedtime  insulin lispro (ADMELOG) corrective regimen sliding scale   SubCutaneous three times a day before meals  insulin lispro Injectable (ADMELOG) 8 Unit(s) SubCutaneous three times a day before meals  magnesium sulfate  IVPB 2 Gram(s) IV Intermittent once  NIFEdipine XL 30 milliGRAM(s) Oral daily  potassium phosphate / sodium phosphate Powder (PHOS-NaK) 1 Packet(s) Oral three times a day  sodium chloride 0.45% with potassium chloride 20 mEq/L 1000 milliLiter(s) (80 mL/Hr) IV Continuous <Continuous>      MEDICATIONS  (PRN):      REVIEW OF SYSTEMS:                           ALL ROS DONE [ X   ]    CONSTITUTIONAL:  LETHARGIC [   ], FEVER [   ], UNRESPONSIVE [   ]  CVS:  CP  [   ], SOB, [   ], PALPITATIONS [   ], DIZZYNESS [   ]  RS: COUGH [   ], SPUTUM [   ]  GI: ABDOMINAL PAIN [   ], NAUSEA [   ], VOMITINGS [   ], DIARRHEA [   ], CONSTIPATION [   ]  :  DYSURIA [   ], NOCTURIA [   ], INCREASED FREQUENCY [   ], DRIBLING [   ],  SKELETAL: PAINFUL JOINTS [   ], SWOLLEN JOINTS [   ], NECK ACHE [   ], LOW BACK ACHE [   ],  SKIN : ULCERS [   ], RASH [   ], ITCHING [   ]  CNS: HEAD ACHE [   ], DOUBLE VISION [   ], BLURRED VISION [   ], AMS / CONFUSION [   ], SEIZURES [   ], WEAKNESS [   ],TINGLING / NUMBNESS [   ]      PHYSICAL EXAMINATION:  GENERAL APPEARANCE: NO DISTRESS  HEENT:  NO PALLOR, NO  JVD,  NO   NODES, NECK SUPPLE  CVS: S1 +, S2 +,   RS: AEEB,  OCCASIONAL  RALES +,   NO RONCHI  ABD: SOFT, NT, NO, BS +  EXT: NO PE  SKIN: WARM,  JAUNDICE  SKELETAL:  ROM ACCEPTABLE  CNS:  AAO X 2-3    RADIOLOGY :    RADIOLOGY AND READINGS REVIEWED    ASSESSMENT :       PLAN:  HPI:  67 year old male PMH CAD s/p CABG, DM, HLD coming in with jaundice and generalized body aches. Patient is lethargic and unable to provide much history. As per ED provider, " his symptoms started about 5 days ago initially with flu-like symptoms and vomiting. went to PMD and was given a nausea medication and felt improved but now feeling worse again. daughter states jaundice is also getting worse. has lost about 10-15lbs in the past week."     (10 Feb 2023 02:52)    # CASE D/W PATIENT'S DAUGHTER FINESSE [place] [ALSO @ [phone]] AT BEDSIDE ALL QUESTIONS ANSWERED [2/14]    # HHS - RESOLVED  # DM  - ON LANTUS, TID INSULIN, SSI + FS  - HBA1C - 9.1  - ENDOCRINOLOGY CONSULT PLACED    # SEPSIS S/T SUSPECTED PNEUMONIA  - PLACED ON ROCEPHIN, BCX - NGTD    # KYLE - RESOLVED  # HYPERNATREMIA  - S/P IVF  - STRICT IS AN OS  - MONITOR CR  - AVOID NEPHROTOXIC AGENTS  - NEPHROLOGY CONSULT    # HYPOKALEMIA, HYPOMAGNESEMIA  - REPLETING WITH SUPPLEMENT    # JAUNDICE, HYPERBILIRUBINEMIA   - NOTED CT A/P  - NOTED MRCP  - F/U HEPATITIS PANEL, AUTOIMMUNE WORKUP, TUMOR MARKERS, IRON PANEL  - GI CONSULT IN PROGRESS  - HEPATOLOGY CONSULT    # CAD s/p CABG  # HTN  - ON PLAVIX  - ON NIFEDIPINE    # HYPOCALCEMIA  # VIT D. DEFICIENCY  - F/U IONIZED CALCIUM  - NOTED PTH, 25OH VIT D  - PLACED ON VIT D    # ? A.FIB  - ON XARELTO, HOLD  - DAUGHTER UNSURE, WILL OBTAIN OUTPATIENT RECORDS IF POSSIBLE    # HLD - ON STATIN, HOLD    # GI PPX     Patient is a 67y old  Male who presents with a chief complaint of hyperosmolar hyperglycemic state (15 Feb 2023 09:28)    PATIENT IS SEEN AND EXAMINED IN MEDICAL FLOOR. PATIENT REPORTS INTERMITTENT EPIGASTRIC/LUQ ABDOMINAL PAIN.     ALLERGIES:  No Known Allergies    VITALS:    Vital Signs Last 24 Hrs  T(C): 36.8 (15 Feb 2023 04:45), Max: 36.9 (14 Feb 2023 14:37)  T(F): 98.3 (15 Feb 2023 04:45), Max: 98.4 (14 Feb 2023 14:37)  HR: 71 (15 Feb 2023 04:45) (71 - 80)  BP: 128/72 (15 Feb 2023 04:45) (118/68 - 131/72)  BP(mean): --  RR: 18 (15 Feb 2023 04:45) (17 - 18)  SpO2: 100% (15 Feb 2023 04:45) (95% - 100%)    Parameters below as of 15 Feb 2023 04:45  Patient On (Oxygen Delivery Method): room air        LABS:    CBC Full  -  ( 15 Feb 2023 06:58 )  WBC Count : 10.49 K/uL  RBC Count : 4.19 M/uL  Hemoglobin : 13.1 g/dL  Hematocrit : 38.7 %  Platelet Count - Automated : 134 K/uL  Mean Cell Volume : 92.4 fl  Mean Cell Hemoglobin : 31.3 pg  Mean Cell Hemoglobin Concentration : 33.9 gm/dL  Auto Neutrophil # : x  Auto Lymphocyte # : x  Auto Monocyte # : x  Auto Eosinophil # : x  Auto Basophil # : x  Auto Neutrophil % : x  Auto Lymphocyte % : x  Auto Monocyte % : x  Auto Eosinophil % : x  Auto Basophil % : x      02-15    139  |  106  |  17  ----------------------------<  165<H>  3.8   |  24  |  0.70    Ca    6.4<LL>      15 Feb 2023 06:58  Phos  2.1     02-15  Mg     1.4     02-15    TPro  5.1<L>  /  Alb  1.8<L>  /  TBili  7.0<H>  /  DBili  x   /  AST  101<H>  /  ALT  114<H>  /  AlkPhos  164<H>  02-15    CAPILLARY BLOOD GLUCOSE      POCT Blood Glucose.: 131 mg/dL (15 Feb 2023 07:57)  POCT Blood Glucose.: 124 mg/dL (14 Feb 2023 21:47)  POCT Blood Glucose.: 143 mg/dL (14 Feb 2023 16:36)  POCT Blood Glucose.: 133 mg/dL (14 Feb 2023 12:31)        LIVER FUNCTIONS - ( 15 Feb 2023 06:58 )  Alb: 1.8 g/dL / Pro: 5.1 g/dL / ALK PHOS: 164 U/L / ALT: 114 U/L DA / AST: 101 U/L / GGT: x           Creatinine Trend: 0.70<--, 0.73<--, 0.99<--, 1.36<--, 1.32<--, 1.49<--  I&O's Summary    14 Feb 2023 07:01  -  15 Feb 2023 07:00  --------------------------------------------------------  IN: 0 mL / OUT: 460 mL / NET: -460 mL            Clean Catch Clean Catch (Midstream)  02-10 @ 02:35   <10,000 CFU/mL Normal Urogenital Nerissa  --  --          MEDICATIONS:    MEDICATIONS  (STANDING):  allopurinol 100 milliGRAM(s) Oral daily  calcitriol   Capsule 0.25 MICROGram(s) Oral daily  calcium carbonate   1250 mG (OsCal) 1 Tablet(s) Oral daily  cefTRIAXone   IVPB      cefTRIAXone   IVPB 1000 milliGRAM(s) IV Intermittent every 24 hours  cholecalciferol 1000 Unit(s) Oral daily  insulin glargine Injectable (LANTUS) 10 Unit(s) SubCutaneous at bedtime  insulin lispro (ADMELOG) corrective regimen sliding scale   SubCutaneous three times a day before meals  insulin lispro Injectable (ADMELOG) 8 Unit(s) SubCutaneous three times a day before meals  magnesium sulfate  IVPB 2 Gram(s) IV Intermittent once  NIFEdipine XL 30 milliGRAM(s) Oral daily  potassium phosphate / sodium phosphate Powder (PHOS-NaK) 1 Packet(s) Oral three times a day  sodium chloride 0.45% with potassium chloride 20 mEq/L 1000 milliLiter(s) (80 mL/Hr) IV Continuous <Continuous>      MEDICATIONS  (PRN):      REVIEW OF SYSTEMS:                           ALL ROS DONE [ X   ]    CONSTITUTIONAL:  LETHARGIC [   ], FEVER [   ], UNRESPONSIVE [   ]  CVS:  CP  [   ], SOB, [   ], PALPITATIONS [   ], DIZZYNESS [   ]  RS: COUGH [   ], SPUTUM [   ]  GI: ABDOMINAL PAIN [   ], NAUSEA [   ], VOMITINGS [   ], DIARRHEA [   ], CONSTIPATION [   ]  :  DYSURIA [   ], NOCTURIA [   ], INCREASED FREQUENCY [   ], DRIBLING [   ],  SKELETAL: PAINFUL JOINTS [   ], SWOLLEN JOINTS [   ], NECK ACHE [   ], LOW BACK ACHE [   ],  SKIN : ULCERS [   ], RASH [   ], ITCHING [   ]  CNS: HEAD ACHE [   ], DOUBLE VISION [   ], BLURRED VISION [   ], AMS / CONFUSION [   ], SEIZURES [   ], WEAKNESS [   ],TINGLING / NUMBNESS [   ]      PHYSICAL EXAMINATION:  GENERAL APPEARANCE: NO DISTRESS  HEENT:  NO PALLOR, NO  JVD,  NO   NODES, NECK SUPPLE  CVS: S1 +, S2 +,   RS: AEEB,  OCCASIONAL  RALES +,   NO RONCHI  ABD: SOFT, NO, BS +   ;  TTP IN EPIGASTRIC/LUQ REGION +  EXT: NO PE  SKIN: WARM,  JAUNDICE  SKELETAL:  ROM ACCEPTABLE  CNS:  AAO X 2-3    RADIOLOGY :    RADIOLOGY AND READINGS REVIEWED    ASSESSMENT :       PLAN:  HPI:  67 year old male PMH CAD s/p CABG, DM, HLD coming in with jaundice and generalized body aches. Patient is lethargic and unable to provide much history. As per ED provider, " his symptoms started about 5 days ago initially with flu-like symptoms and vomiting. went to PMD and was given a nausea medication and felt improved but now feeling worse again. daughter states jaundice is also getting worse. has lost about 10-15lbs in the past week."     (10 Feb 2023 02:52)    # CASE D/W PATIENT'S DAUGHTER FINESSE [place] [ALSO @ [phone]] AT BEDSIDE ALL QUESTIONS ANSWERED [2/14]  # CASE D/W PATIENT VIA [email or web address] , JESSY DENNY # [id number]    # HHS - RESOLVED  # DM  - ON LANTUS, TID INSULIN, SSI + FS  - HBA1C - 9.1  - ENDOCRINOLOGY CONSULT PLACED    # SEPSIS S/T SUSPECTED PNEUMONIA  - PLACED ON ROCEPHIN, BCX - NGTD    # KYLE - RESOLVED  # HYPERNATREMIA  - S/P IVF  - STRICT IS AN OS  - MONITOR CR  - AVOID NEPHROTOXIC AGENTS  - NEPHROLOGY CONSULT    # HYPOKALEMIA, HYPOMAGNESEMIA  - REPLETING WITH SUPPLEMENT    # JAUNDICE, HYPERBILIRUBINEMIA   - NOTED CT A/P  - NOTED MRCP  - F/U HEPATITIS PANEL, AUTOIMMUNE WORKUP, TUMOR MARKERS, IRON PANEL  - GI CONSULT IN PROGRESS  - HEPATOLOGY CONSULT IN PROGRESS    - PATIENT REPORTS ONLY SUPPLEMENT HE USES IS GINSENG    # CAD s/p CABG  # HTN  - ON PLAVIX  - ON NIFEDIPINE    # HYPOCALCEMIA  # VIT D. DEFICIENCY  - F/U IONIZED CALCIUM  - NOTED PTH, 25OH VIT D  - PLACED ON VIT D AND CALCIUM    # HYPOMAGNESEMIA  - REPLETING WITH SUPPLEMENT    # ? A.FIB  - ON XARELTO, HOLD  - DAUGHTER UNSURE, WILL OBTAIN OUTPATIENT RECORDS IF POSSIBLE    # HLD - ON STATIN, HOLD    # GI PPX

## 2023-02-15 NOTE — CONSULT NOTE ADULT - SUBJECTIVE AND OBJECTIVE BOX
Chief Complaint:  Patient is a 67y old  Male who presents with a chief complaint of hyperosmolar hyperglycemic state (14 Feb 2023 15:19)      HPI:  JESSY ORLANDO is a 67y Male with Hx of HTN, Type 2 DM, HLD, CAD s/p CABG presented to [facility] ED on 2/10/23 with 5 days Hx malaise, generalized body aches, initially flu like symptoms, nausea, vomiting, 10-15 lb weight loss in a week and jaundice, and was admitted to MICU with HHS with severe hypokalemia, KYLE and jaundice / mostly direct hyperbilirubinemia, and was also found to have thrombocytopenia (PLT 55).   He has been followed by GI service. His bilirubin improved from 14.5 to 7.0, majority was direct, with elevated liver enzymes, including ->164, and elevated transaminases (AST 28-->101, ALT 90-->114). Noted hypoalbuminemia (alb 1.8), no coagulopathy (INR 1.1). His PLT count improved from 55 to 134. His KYLE resolved. He still has electrolytes abnormalities, including hypophosphatemia, hypomagnesemia, hypocalcemia (even after correction with albumin).   CT a/p w/o contrast 2/10/23 showed normal liver, GB and biliary tree, a 3 mm pancreatic cyst, no ascites.  It also showed branching tree in bud opacities in R middle lobe, and dependent airspace opacities in b/l lower lobes of the lung.   MRCP w/wo contrast 2/14/23 showed no biliary obstruction either, reported normal liver, 6 mm CBD, normal GB, trace ascites and new mild R pleural effusion.     Hepatology was consulted for the direct hyperbilirubinemia with mixed pattern mild liver enzyme elevation, without evidence of biliary obstruction.     PMHX/PSHX:    CAD (coronary artery disease)  HTN (hypertension)  Diabetes mellitus  HLD (hyperlipidemia)    Allergies:  No Known Allergies      Home Medications: reviewed  Hospital Medications:  allopurinol 100 milliGRAM(s) Oral daily  calcitriol   Capsule 0.25 MICROGram(s) Oral daily  calcium carbonate   1250 mG (OsCal) 1 Tablet(s) Oral daily  cefTRIAXone   IVPB      cefTRIAXone   IVPB 1000 milliGRAM(s) IV Intermittent every 24 hours  cholecalciferol 1000 Unit(s) Oral daily  insulin glargine Injectable (LANTUS) 10 Unit(s) SubCutaneous at bedtime  insulin lispro (ADMELOG) corrective regimen sliding scale   SubCutaneous three times a day before meals  insulin lispro Injectable (ADMELOG) 8 Unit(s) SubCutaneous three times a day before meals  NIFEdipine XL 30 milliGRAM(s) Oral daily  sodium chloride 0.45% with potassium chloride 20 mEq/L 1000 milliLiter(s) IV Continuous <Continuous>      Social History:   Tob: Denies  EtOH: Denies  Illicit Drugs: Denies    Family history:    Denies family history of colon cancer/polyps, stomach cancer/polyps, pancreatic cancer/masses, liver cancer/disease, ovarian cancer and endometrial cancer.    ROS:   General:  No  fevers, chills, night sweats, fatigue  Eyes:  Good vision, no reported pain  ENT:  No sore throat, pain, runny nose  CV:  No pain, palpitations  Pulm:  No dyspnea, cough  GI:  See HPI, otherwise negative  :  No  incontinence, nocturia  Muscle:  No pain, weakness  Neuro:  No memory problems  Psych:  No insomnia, mood problems, depression  Endocrine:  No polyuria, polydipsia, cold/heat intolerance  Heme:  No petechiae, ecchymosis, easy bruisability  Skin:  No rash    PHYSICAL EXAM:   Vital Signs:  Vital Signs Last 24 Hrs  T(C): 36.8 (15 Feb 2023 04:45), Max: 36.9 (14 Feb 2023 14:37)  T(F): 98.3 (15 Feb 2023 04:45), Max: 98.4 (14 Feb 2023 14:37)  HR: 71 (15 Feb 2023 04:45) (71 - 80)  BP: 128/72 (15 Feb 2023 04:45) (118/68 - 131/72)  BP(mean): --  RR: 18 (15 Feb 2023 04:45) (17 - 18)  SpO2: 100% (15 Feb 2023 04:45) (95% - 100%)    Parameters below as of 15 Feb 2023 04:45  Patient On (Oxygen Delivery Method): room air      Daily     Daily     GENERAL: no acute distress  NEURO: alert, no asterixis  HEENT: anicteric sclera, no conjunctival pallor appreciated  CHEST: no respiratory distress, no accessory muscle use  CARDIAC: regular rate, rhythm  ABDOMEN: soft, non-tender, non-distended, no rebound or guarding  EXTREMITIES: warm, well perfused, no edema  SKIN: no lesions noted    LABS: reviewed                        13.1   10.49 )-----------( 134      ( 15 Feb 2023 06:58 )             38.7     02-15    139  |  106  |  17  ----------------------------<  165<H>  3.8   |  24  |  0.70    Ca    6.4<LL>      15 Feb 2023 06:58  Phos  2.1     02-15  Mg     1.4     02-15    TPro  5.1<L>  /  Alb  1.8<L>  /  TBili  7.0<H>  /  DBili  x   /  AST  101<H>  /  ALT  114<H>  /  AlkPhos  164<H>  02-15    LIVER FUNCTIONS - ( 15 Feb 2023 06:58 )  Alb: 1.8 g/dL / Pro: 5.1 g/dL / ALK PHOS: 164 U/L / ALT: 114 U/L DA / AST: 101 U/L / GGT: x               Diagnostic Studies: see sunrise for full report         Chief Complaint:  Patient is a 67y old  Male who presents with a chief complaint of hyperosmolar hyperglycemic state (14 Feb 2023 15:19)      HPI:  JESSY ORLANDO is a 67y Male with Hx of HTN, uncontrolled Type 2 DM (Hba1c 9.1), HLD, CAD s/p CABG presented to [facility] ED on 2/10/23 with 5 days Hx malaise, generalized body aches, initially flu like symptoms, nausea, vomiting, 10-15 lb weight loss in a week and jaundice, and was admitted to MICU with HHS with severe hypokalemia, KYLE and jaundice / mostly direct hyperbilirubinemia, and was also found to have thrombocytopenia (PLT 55).   He has been followed by GI service. His bilirubin improved from 14.5 to 7.0, majority was direct, with elevated liver enzymes, including ->164, and elevated transaminases (AST 28-->101, ALT 90-->114). Noted hypoalbuminemia (alb 1.8), no coagulopathy (INR 1.1). His PLT count improved from 55 to 134. His KYLE resolved. He still has electrolytes abnormalities, including hypophosphatemia, hypomagnesemia, hypocalcemia (even after correction with albumin).   CT a/p w/o contrast 2/10/23 showed normal liver, GB and biliary tree, a 3 mm pancreatic cyst, no ascites.  It also showed branching tree in bud opacities in R middle lobe, and dependent airspace opacities in b/l lower lobes of the lung.   MRCP w/wo contrast 2/14/23 showed no biliary obstruction either, reported normal liver, 6 mm CBD, normal GB, trace ascites and new mild R pleural effusion.     Hepatology was consulted for the direct hyperbilirubinemia with mixed pattern mild liver enzyme elevation, without evidence of biliary obstruction.     Liver workup so far: Hep A IgM neg, HBsAG neg, HBcAb IgM neg, HCV ab neg, ceruloplasmin 23, MARA weak pos 1:80.     PMHX/PSHX:    CAD (coronary artery disease)  HTN (hypertension)  Diabetes mellitus  HLD (hyperlipidemia)    Allergies:  No Known Allergies      Home Medications: reviewed  Hospital Medications:  allopurinol 100 milliGRAM(s) Oral daily  calcitriol   Capsule 0.25 MICROGram(s) Oral daily  calcium carbonate   1250 mG (OsCal) 1 Tablet(s) Oral daily  cefTRIAXone   IVPB      cefTRIAXone   IVPB 1000 milliGRAM(s) IV Intermittent every 24 hours  cholecalciferol 1000 Unit(s) Oral daily  insulin glargine Injectable (LANTUS) 10 Unit(s) SubCutaneous at bedtime  insulin lispro (ADMELOG) corrective regimen sliding scale   SubCutaneous three times a day before meals  insulin lispro Injectable (ADMELOG) 8 Unit(s) SubCutaneous three times a day before meals  NIFEdipine XL 30 milliGRAM(s) Oral daily  sodium chloride 0.45% with potassium chloride 20 mEq/L 1000 milliLiter(s) IV Continuous <Continuous>      Social History:   Tob: Denies  EtOH: Denies  Illicit Drugs: Denies    Family history:    Denies family history of colon cancer/polyps, stomach cancer/polyps, pancreatic cancer/masses, liver cancer/disease, ovarian cancer and endometrial cancer.    ROS:   General:  No  fevers, chills, night sweats, fatigue  Eyes:  Good vision, no reported pain  ENT:  No sore throat, pain, runny nose  CV:  No pain, palpitations  Pulm:  No dyspnea, cough  GI:  See HPI, otherwise negative  :  No  incontinence, nocturia  Muscle:  No pain, weakness  Neuro:  No memory problems  Psych:  No insomnia, mood problems, depression  Endocrine:  No polyuria, polydipsia, cold/heat intolerance  Heme:  No petechiae, ecchymosis, easy bruisability  Skin:  No rash    PHYSICAL EXAM:   Vital Signs:  Vital Signs Last 24 Hrs  T(C): 36.8 (15 Feb 2023 04:45), Max: 36.9 (14 Feb 2023 14:37)  T(F): 98.3 (15 Feb 2023 04:45), Max: 98.4 (14 Feb 2023 14:37)  HR: 71 (15 Feb 2023 04:45) (71 - 80)  BP: 128/72 (15 Feb 2023 04:45) (118/68 - 131/72)  BP(mean): --  RR: 18 (15 Feb 2023 04:45) (17 - 18)  SpO2: 100% (15 Feb 2023 04:45) (95% - 100%)    Parameters below as of 15 Feb 2023 04:45  Patient On (Oxygen Delivery Method): room air      Daily     Daily     GENERAL: no acute distress  NEURO: alert, no asterixis  HEENT: anicteric sclera, no conjunctival pallor appreciated  CHEST: no respiratory distress, no accessory muscle use  CARDIAC: regular rate, rhythm  ABDOMEN: soft, non-tender, non-distended, no rebound or guarding  EXTREMITIES: warm, well perfused, no edema  SKIN: no lesions noted    LABS: reviewed                        13.1   10.49 )-----------( 134      ( 15 Feb 2023 06:58 )             38.7     02-15    139  |  106  |  17  ----------------------------<  165<H>  3.8   |  24  |  0.70    Ca    6.4<LL>      15 Feb 2023 06:58  Phos  2.1     02-15  Mg     1.4     02-15    TPro  5.1<L>  /  Alb  1.8<L>  /  TBili  7.0<H>  /  DBili  x   /  AST  101<H>  /  ALT  114<H>  /  AlkPhos  164<H>  02-15    LIVER FUNCTIONS - ( 15 Feb 2023 06:58 )  Alb: 1.8 g/dL / Pro: 5.1 g/dL / ALK PHOS: 164 U/L / ALT: 114 U/L DA / AST: 101 U/L / GGT: x               Diagnostic Studies: see sunrise for full report         Chief Complaint:  Patient is a 67y old  Male who presents with a chief complaint of hyperosmolar hyperglycemic state (14 Feb 2023 15:19)    Mandarin  ID [id number]    HPI:  JESSY ORLANDO is a 67y Male with Hx of HTN, uncontrolled Type 2 DM (Hba1c 9.1), HLD, CAD s/p CABG presented to [facility] ED on 2/10/23 with 5 days Hx malaise, generalized body aches, initially flu like symptoms, nausea, vomiting, 10-15 lb weight loss in a week and jaundice, and was admitted to MICU with HHS with severe hypokalemia, KYLE and jaundice / mostly direct hyperbilirubinemia, and was also found to have thrombocytopenia (PLT 55).   He has been followed by GI service. His bilirubin improved from 14.5 to 7.0, majority was direct, with elevated liver enzymes, including ->164, and elevated transaminases (AST 28-->101, ALT 90-->114). Noted hypoalbuminemia (alb 1.8), no coagulopathy (INR 1.1). His PLT count improved from 55 to 134. His KYLE resolved. He still has electrolytes abnormalities, including hypophosphatemia, hypomagnesemia, hypocalcemia (even after correction with albumin).   CT a/p w/o contrast 2/10/23 showed normal liver, GB and biliary tree, a 3 mm pancreatic cyst, no ascites.  It also showed branching tree in bud opacities in R middle lobe, and dependent airspace opacities in b/l lower lobes of the lung.   MRCP w/wo contrast 2/14/23 showed no biliary obstruction either, reported normal liver, 6 mm CBD, normal GB, trace ascites and new mild R pleural effusion.     Hepatology was consulted for the direct hyperbilirubinemia with mixed pattern mild liver enzyme elevation, without evidence of biliary obstruction.     Liver workup so far: Hep A IgM neg, HBsAG neg, HBcAb IgM neg, HCV ab neg, ceruloplasmin 23, MARA weak pos 1:80.   Patient denies any prior Hx of liver disease or FHx of liver disease. He did not notice himself the jaundice. Denies new medications, he has been taking both the statin and allopurinol "for long time". He denies herbal, OTC supplements.      PMHX/PSHX:    CAD (coronary artery disease)  HTN (hypertension)  Diabetes mellitus  HLD (hyperlipidemia)    Allergies:  No Known Allergies      Home Medications: reviewed  Hospital Medications:  allopurinol 100 milliGRAM(s) Oral daily  calcitriol   Capsule 0.25 MICROGram(s) Oral daily  calcium carbonate   1250 mG (OsCal) 1 Tablet(s) Oral daily  cefTRIAXone   IVPB      cefTRIAXone   IVPB 1000 milliGRAM(s) IV Intermittent every 24 hours  cholecalciferol 1000 Unit(s) Oral daily  insulin glargine Injectable (LANTUS) 10 Unit(s) SubCutaneous at bedtime  insulin lispro (ADMELOG) corrective regimen sliding scale   SubCutaneous three times a day before meals  insulin lispro Injectable (ADMELOG) 8 Unit(s) SubCutaneous three times a day before meals  NIFEdipine XL 30 milliGRAM(s) Oral daily  sodium chloride 0.45% with potassium chloride 20 mEq/L 1000 milliLiter(s) IV Continuous <Continuous>      Social History:   Tob: Denies  EtOH: Denies  Illicit Drugs: Denies    Family history:    Denies family history of colon cancer/polyps, stomach cancer/polyps, pancreatic cancer/masses, liver cancer/disease, ovarian cancer and endometrial cancer.    ROS:   General:  Had fevers and chills at home  Eyes:  Good vision, no reported pain  ENT:  No sore throat, pain, runny nose  CV:  No pain, palpitations  Pulm:  No dyspnea, cough  GI:  No abdominal pain, N/V, reports that has not had BM for few days, but denies any diarrhea prior or any change in urine color.  :  No  dysuria  Muscle:  No pain, weakness  Neuro:  No memory problems  Skin:  No rash    PHYSICAL EXAM:   Vital Signs:  Vital Signs Last 24 Hrs  T(C): 36.8 (15 Feb 2023 04:45), Max: 36.9 (14 Feb 2023 14:37)  T(F): 98.3 (15 Feb 2023 04:45), Max: 98.4 (14 Feb 2023 14:37)  HR: 71 (15 Feb 2023 04:45) (71 - 80)  BP: 128/72 (15 Feb 2023 04:45) (118/68 - 131/72)  BP(mean): --  RR: 18 (15 Feb 2023 04:45) (17 - 18)  SpO2: 100% (15 Feb 2023 04:45) (95% - 100%)    Parameters below as of 15 Feb 2023 04:45  Patient On (Oxygen Delivery Method): room air      Daily     Daily     GENERAL: no acute distress  NEURO: AAOx3, no asterixis  HEENT: icteric sclera  CHEST: no respiratory distress, no accessory muscle use  CARDIAC: regular rate, rhythm  ABDOMEN: soft, non-tender, non-distended, no rebound or guarding, BS+  EXTREMITIES: warm, well perfused, no edema  SKIN: no rash    LABS: reviewed                        13.1   10.49 )-----------( 134      ( 15 Feb 2023 06:58 )             38.7     02-15    139  |  106  |  17  ----------------------------<  165<H>  3.8   |  24  |  0.70    Ca    6.4<LL>      15 Feb 2023 06:58  Phos  2.1     02-15  Mg     1.4     02-15    TPro  5.1<L>  /  Alb  1.8<L>  /  TBili  7.0<H>  /  DBili  x   /  AST  101<H>  /  ALT  114<H>  /  AlkPhos  164<H>  02-15    LIVER FUNCTIONS - ( 15 Feb 2023 06:58 )  Alb: 1.8 g/dL / Pro: 5.1 g/dL / ALK PHOS: 164 U/L / ALT: 114 U/L DA / AST: 101 U/L / GGT: x               Diagnostic Studies: see sunrise for full report

## 2023-02-15 NOTE — PROGRESS NOTE ADULT - SUBJECTIVE AND OBJECTIVE BOX
[facility] NEPHROLOGY- PROGRESS NOTE    67 year old Mandarin speaking male with CAD s/p CABG, DM, HLD a/w jaundice, HHS, and KYLE. Nephrology consulted for Elevated serum creatinine.    Hospital Medications: Medications reviewed.  REVIEW OF SYSTEMS: Mandarin  # [id number]  CONSTITUTIONAL: No fevers or chills  RESPIRATORY: No shortness of breath  CARDIOVASCULAR: No chest pain.  GASTROINTESTINAL: No nausea, vomiting, or diarrhea + abdominal pain.   VASCULAR: No bilateral lower extremity edema.     VITALS:  T(F): 96.7 (02-15-23 @ 12:20), Max: 98.4 (23 @ 21:39)  HR: 76 (02-15-23 @ 12:20)  BP: 126/72 (02-15-23 @ 12:20)  RR: 17 (02-15-23 @ 12:20)  SpO2: 97% (02-15-23 @ 12:20)  Wt(kg): --     @ 07:01  -  02-15 @ 07:00  --------------------------------------------------------  IN: 0 mL / OUT: 460 mL / NET: -460 mL    02-15 @ 07:01  -  02-15 @ 14:56  --------------------------------------------------------  IN: 0 mL / OUT: 650 mL / NET: -650 mL          PHYSICAL EXAM:  Constitutional: NAD  HEENT: icteric sclera,   Respiratory: CTA b/l  Cardiovascular: S1, S2, RRR  Gastrointestinal: BS+, soft, Mild epigastric tenderness  : No mendoza.  Extremities: No peripheral edema  Derm: +jaundice    LABS:  02-15  139 <--, 142 <--, 147 <--, 147 <--, 150 <--, 153 <--, 154 <--  139  |  106  |  17  ----------------------------<  165<H>  3.8   |  24  |  0.70    Ca    6.4<LL>      15 Feb 2023 06:58  Phos  2.1     02-15  Mg     1.4     02-15    TPro  5.1<L>  /  Alb  1.8<L>  /  TBili  7.0<H>  /  DBili      /  AST  101<H>  /  ALT  114<H>  /  AlkPhos  164<H>  02-15    Creatinine Trend: 0.70 <--, 0.73 <--, 0.99 <--, 1.36 <--, 1.32 <--, 1.49 <--, 1.71 <--, 1.69 <--, 1.76 <--, 1.95 <--, 2.09 <--, 2.25 <--, 2.42 <--, 3.12 <--, 2.90 <--, 3.84 <--                        13.1   10.49 )-----------( 134      ( 15 Feb 2023 06:58 )             38.7     Urine Studies:  Urinalysis Basic - ( 10 Feb 2023 02:35 )    Color: Yellow / Appearance: Clear / S.010 / pH:   Gluc:  / Ketone: Trace  / Bili: Small / Urobili: 1   Blood:  / Protein: 15 / Nitrite: Negative   Leuk Esterase: Negative / RBC: 2-5 /HPF / WBC 0-2 /HPF   Sq Epi:  / Non Sq Epi: Occasional /HPF / Bacteria: Trace /HPF

## 2023-02-15 NOTE — PROGRESS NOTE ADULT - ASSESSMENT
66 yo Male, from home, with PMH CAD s/p CABG, DM, HLD presenting with jaundice and lethargy. Patient is lethargic and unable to provide much history. As per ED provider, "his symptoms started about 5 days ago initially with flu-like symptoms and vomiting. went to PMD and was given a nausea medication and felt improved but now feeling worse again. Daughter states jaundice is also getting worse. Has lost about 10-15lbs in the past week."   Patient was noted to have blood glucose > 1000 with severe hypokalemia, KYLE, and elevated bilirubin levels. He was admitted to the ICU for HHS management. He received 6L IVF boluses and potassium supplements   Patient's blood sugar levels improved.  Patient was also found to have elevated bilirubin of 14.5, CT abdomen and pelvis was performed, no obstructing lesions found, there was a small 3mm pancreatic cyst and tree in bud opacities in right middle lobe. Empiric ceftriaxone was started. GI Dr. Stroud consulted, MRCP recommended which showed GBD WNL with no gall stones.  Patient also had thrombocytopenia, no signs of bleeding, normal morphology with no schistocytes. D5w was started for hypernatremia. Lion was discontinued, urine culture was negative. Patient is was downgraded to medicine floors on 2/11    Patient was seem at bedside, denies abd pain, vomiting, asking for regular food, diet advanced. Generalized juandice persists.  Hepatology was consulted for further recommendations  Potassium and Magnesium replaced

## 2023-02-15 NOTE — PHARMACOTHERAPY INTERVENTION NOTE - COMMENTS
ceftriaxone for PNA, day #4, recommended po switch as pt qualifies  
IV ceftriaxone for PNA, 5 days recommended Tx completed, recommended to d/c  
Lantus 10 based on use of 28 insulin aspart on sliding scale

## 2023-02-15 NOTE — PROGRESS NOTE ADULT - ASSESSMENT
67 year old Mandarin speaking male with CAD s/p CABG, DM, HLD a/w jaundice, HHS, and KYLE. Nephrology consulted for Elevated serum creatinine.    1. KYLE hemodynamically-mediated in the setting of HHS, dehydration with n/v, KYLE resolved with IVF. Strict I/Os. Avoid nephrotoxins/ NSAIDs/ RCA. Monitor BMP.  2. Hypernatremia- due to dehydration in the setting of N/V, poor po intake, HHS.   Initial Na+ when corrected for severe hyperglycemia is 163.  Hypernatremia resolved; can d/c 1/2NS with 20 meq KCL @ 80 ml/hr. Encourage PO intake  Monitor serum Na.   3. Hypokalemia- resolved. Monitor serum K+  4. Hypoalbuminemia- most likely due to malnutrition.  Likely longer period of time over which pt has had weight loss and poor oral intake.  ?underlying malignancy? Check urine protein/creatine ratio to r/o proteinuria.  5. Hypercalcemia- due to dehydration. Now with hypocalcemia; Ca 8.16.  25 OH vit D 16.5, PTHi 194- Pt on Ergeo and Vitamin D 1000 units PO daily. Will increase calcium carbonate to 1 tab bid. Monitor ionized Ca.   6. Hypophosphatemia- pt given Kphos PO. . HypoMg- pt given Mg IV; will start MgOx 400mg PO bid. . Monitor lytes.    [facility] NEPHROLOGY  Devyn Palmer M.D.  Buster York D.O.  Padmini Jose M.D.  Allison Phillip, MSN, ANP-C    Telephone: [phone]  Facsimile: [phone] [place] [address], [id number]  [place] [address]

## 2023-02-16 LAB
ALBUMIN SERPL ELPH-MCNC: 1.8 G/DL — LOW (ref 3.5–5)
ALP SERPL-CCNC: 181 U/L — HIGH (ref 40–120)
ALT FLD-CCNC: 93 U/L DA — HIGH (ref 10–60)
ANION GAP SERPL CALC-SCNC: 8 MMOL/L — SIGNIFICANT CHANGE UP (ref 5–17)
AST SERPL-CCNC: 45 U/L — HIGH (ref 10–40)
BILIRUB SERPL-MCNC: 5 MG/DL — HIGH (ref 0.2–1.2)
BUN SERPL-MCNC: 21 MG/DL — HIGH (ref 7–18)
CALCIUM SERPL-MCNC: 7.7 MG/DL — LOW (ref 8.4–10.5)
CHLORIDE SERPL-SCNC: 107 MMOL/L — SIGNIFICANT CHANGE UP (ref 96–108)
CO2 SERPL-SCNC: 25 MMOL/L — SIGNIFICANT CHANGE UP (ref 22–31)
CREAT SERPL-MCNC: 0.88 MG/DL — SIGNIFICANT CHANGE UP (ref 0.5–1.3)
EGFR: 94 ML/MIN/1.73M2 — SIGNIFICANT CHANGE UP
GLUCOSE BLDC GLUCOMTR-MCNC: 155 MG/DL — HIGH (ref 70–99)
GLUCOSE BLDC GLUCOMTR-MCNC: 209 MG/DL — HIGH (ref 70–99)
GLUCOSE BLDC GLUCOMTR-MCNC: 217 MG/DL — HIGH (ref 70–99)
GLUCOSE BLDC GLUCOMTR-MCNC: 262 MG/DL — HIGH (ref 70–99)
GLUCOSE SERPL-MCNC: 210 MG/DL — HIGH (ref 70–99)
HBV CORE IGM SER-ACNC: SIGNIFICANT CHANGE UP
HBV DNA # SERPL NAA+PROBE: SIGNIFICANT CHANGE UP
HBV DNA SERPL NAA+PROBE-LOG#: SIGNIFICANT CHANGE UP LOGIU/ML
HBV E AG SER-ACNC: SIGNIFICANT CHANGE UP
HBV SURFACE AB SER-ACNC: REACTIVE
HBV SURFACE AG SER-ACNC: SIGNIFICANT CHANGE UP
HCT VFR BLD CALC: 35.6 % — LOW (ref 39–50)
HGB BLD-MCNC: 12 G/DL — LOW (ref 13–17)
INR BLD: 1.02 RATIO — SIGNIFICANT CHANGE UP (ref 0.88–1.16)
MAGNESIUM SERPL-MCNC: 1.6 MG/DL — SIGNIFICANT CHANGE UP (ref 1.6–2.6)
MCHC RBC-ENTMCNC: 30.8 PG — SIGNIFICANT CHANGE UP (ref 27–34)
MCHC RBC-ENTMCNC: 33.7 GM/DL — SIGNIFICANT CHANGE UP (ref 32–36)
MCV RBC AUTO: 91.5 FL — SIGNIFICANT CHANGE UP (ref 80–100)
NRBC # BLD: 0 /100 WBCS — SIGNIFICANT CHANGE UP (ref 0–0)
PHOSPHATE SERPL-MCNC: 2.5 MG/DL — SIGNIFICANT CHANGE UP (ref 2.5–4.5)
PLATELET # BLD AUTO: 196 K/UL — SIGNIFICANT CHANGE UP (ref 150–400)
POTASSIUM SERPL-MCNC: 3.8 MMOL/L — SIGNIFICANT CHANGE UP (ref 3.5–5.3)
POTASSIUM SERPL-SCNC: 3.8 MMOL/L — SIGNIFICANT CHANGE UP (ref 3.5–5.3)
PROT SERPL-MCNC: 5 G/DL — LOW (ref 6–8.3)
PROTHROM AB SERPL-ACNC: 12.2 SEC — SIGNIFICANT CHANGE UP (ref 10.5–13.4)
RBC # BLD: 3.89 M/UL — LOW (ref 4.2–5.8)
RBC # FLD: 13.1 % — SIGNIFICANT CHANGE UP (ref 10.3–14.5)
SODIUM SERPL-SCNC: 140 MMOL/L — SIGNIFICANT CHANGE UP (ref 135–145)
WBC # BLD: 8.83 K/UL — SIGNIFICANT CHANGE UP (ref 3.8–10.5)
WBC # FLD AUTO: 8.83 K/UL — SIGNIFICANT CHANGE UP (ref 3.8–10.5)

## 2023-02-16 PROCEDURE — 99233 SBSQ HOSP IP/OBS HIGH 50: CPT

## 2023-02-16 PROCEDURE — 71250 CT THORAX DX C-: CPT | Mod: 26

## 2023-02-16 RX ADMIN — Medication 1000 UNIT(S): at 12:04

## 2023-02-16 RX ADMIN — Medication 1 PACKET(S): at 05:16

## 2023-02-16 RX ADMIN — Medication 6 UNIT(S): at 08:02

## 2023-02-16 RX ADMIN — INSULIN GLARGINE 10 UNIT(S): 100 INJECTION, SOLUTION SUBCUTANEOUS at 21:44

## 2023-02-16 RX ADMIN — MAGNESIUM OXIDE 400 MG ORAL TABLET 400 MILLIGRAM(S): 241.3 TABLET ORAL at 17:12

## 2023-02-16 RX ADMIN — Medication 1 TABLET(S): at 17:13

## 2023-02-16 RX ADMIN — Medication 6: at 12:02

## 2023-02-16 RX ADMIN — Medication 6 UNIT(S): at 17:11

## 2023-02-16 RX ADMIN — Medication 1 PACKET(S): at 21:44

## 2023-02-16 RX ADMIN — Medication 2: at 08:01

## 2023-02-16 RX ADMIN — Medication 1 TABLET(S): at 05:17

## 2023-02-16 RX ADMIN — Medication 1 PACKET(S): at 14:04

## 2023-02-16 RX ADMIN — Medication 30 MILLIGRAM(S): at 05:17

## 2023-02-16 RX ADMIN — Medication 6 UNIT(S): at 12:03

## 2023-02-16 RX ADMIN — MAGNESIUM OXIDE 400 MG ORAL TABLET 400 MILLIGRAM(S): 241.3 TABLET ORAL at 08:19

## 2023-02-16 RX ADMIN — Medication 4: at 17:11

## 2023-02-16 NOTE — PROGRESS NOTE ADULT - ASSESSMENT
66 yo Male, from home, with PMH CAD s/p CABG, DM, HLD presenting with jaundice and lethargy. Patient is lethargic and unable to provide much history. As per ED provider, "his symptoms started about 5 days ago initially with flu-like symptoms and vomiting. went to PMD and was given a nausea medication and felt improved but now feeling worse again. Daughter states jaundice is also getting worse. Has lost about 10-15lbs in the past week."   Patient was noted to have blood glucose > 1000 with severe hypokalemia, KYLE, and elevated bilirubin levels. He was admitted to the ICU for HHS management. He received 6L IVF boluses and potassium supplements   Patient's blood sugar levels improved.  Patient was also found to have elevated bilirubin of 14.5, CT abdomen and pelvis was performed, no obstructing lesions found, there was a small 3mm pancreatic cyst and tree in bud opacities in right middle lobe. Empiric ceftriaxone was started. GI Dr. Stroud consulted, MRCP recommended which showed GBD WNL with no gall stones. Hepatology consulted.  Patient also had thrombocytopenia, no signs of bleeding, normal morphology with no schistocytes. D5w was started for hypernatremia. Lion was discontinued, urine culture was negative. Patient is was downgraded to medicine floors on 2/11  Patient seen at bedside, not in mood to talk this AM as per pt. Generalized juandice persists. Pending CTC

## 2023-02-16 NOTE — PROGRESS NOTE ADULT - SUBJECTIVE AND OBJECTIVE BOX
Interval Events: No acute events overnight. Patient has no acute complaints. Reports eating well.       Allergies    No Known Allergies    Intolerances      Endocrine/Metabolic Medications:  insulin glargine Injectable (LANTUS) 10 Unit(s) SubCutaneous at bedtime  insulin lispro (ADMELOG) corrective regimen sliding scale   SubCutaneous three times a day before meals  insulin lispro Injectable (ADMELOG) 6 Unit(s) SubCutaneous three times a day before meals      Vital Signs Last 24 Hrs  T(C): 37 (16 Feb 2023 13:43), Max: 37 (16 Feb 2023 13:43)  T(F): 98.6 (16 Feb 2023 13:43), Max: 98.6 (16 Feb 2023 13:43)  HR: 82 (16 Feb 2023 13:43) (75 - 86)  BP: 125/74 (16 Feb 2023 13:43) (118/62 - 127/63)  BP(mean): 80 (16 Feb 2023 10:58) (80 - 80)  RR: 18 (16 Feb 2023 13:43) (17 - 18)  SpO2: 96% (16 Feb 2023 13:43) (96% - 97%)    Parameters below as of 16 Feb 2023 10:58  Patient On (Oxygen Delivery Method): room air          PHYSICAL EXAM  All physical exam findings normal, except those marked:  General:	Alert, active, cooperative, NAD, well hydrated  .		[] Abnormal:  Neck		Normal: supple, no cervical adenopathy, no palpable thyroid  .		[] Abnormal:  Cardiovascular	Normal: regular rate, normal S1, S2, no murmurs  .		[] Abnormal:  Respiratory	Normal: no chest wall deformity, normal respiratory pattern, CTA B/L  .		[] Abnormal:  Abdominal	Normal: soft, ND, NT, bowel sounds present, no masses, no organomegaly  .		[] Abnormal:  		Normal normal genitalia, testes descended, circumcised/uncircumcised  .		Yina stage:			Breast yina:  .		Menstrual history:  .		[] Abnormal:  Extremities	Normal: FROM x4  .		[] Abnormal:  Skin		Normal: intact and not indurated, no rash, no acanthosis nigricans  .		[x] Abnormal: Jaundice  Neurologic	Normal: grossly intact  .		[] Abnormal:    LABS                        12.0   8.83  )-----------( 196      ( 16 Feb 2023 06:05 )             35.6                               140    |  107    |  21                  Calcium: 7.7   / iCa: x      (02-16 @ 06:05)    ----------------------------<  210       Magnesium: 1.6                              3.8     |  25     |  0.88             Phosphorous: 2.5      TPro  5.0    /  Alb  1.8    /  TBili  5.0    /  DBili  x      /  AST  45     /  ALT  93     /  AlkPhos  181    16 Feb 2023 06:05    CAPILLARY BLOOD GLUCOSE      POCT Blood Glucose.: 262 mg/dL (16 Feb 2023 11:36)  POCT Blood Glucose.: 155 mg/dL (16 Feb 2023 07:42)  POCT Blood Glucose.: 261 mg/dL (15 Feb 2023 21:03)  POCT Blood Glucose.: 206 mg/dL (15 Feb 2023 16:40)        Assesment/plan    Patient states he was diagnosed with diabetes 7-8 years ago and has never been on insulin. Reports taking medications (jardiance and janumet) regularly. Per daughter when the patient started feeling sick around a week before admission he was not taking his medications but was still eating when he could despite poor appetite. At that time the patient's finger sticks at home were noted to be higher than usual. She noticed the patient's skin beginning to turn yellow and reports that the patient was urinating frequently with very yellow and "bubbly" urine. Just prior to coming to the hospital the patient had increased thirst and very dry mouth that made it difficult for him to swallow. Daughter says that she spoke with patient's PCP recently who said that the patient's blood sugars had been well controlled and his last A1c outpatient was 7. Patient eats a regular diet consisting of meat, fish, vegetables and rice and generally avoids sugary foods and sodas. Denies any recent changes to diet. Patient usually walks and does light exercise in the park every morning. Patient says that he has seen ophthalmologist and podiatrist a very long time ago but daughter is unaware if this is true. Development of HHS possibly contributed to by medication non-compliance in setting of recent flu like illness, however CT findings showing cystic lesion in the body of pancreas and increased direct hyperbilirubinemia concerning for a direct process affecting pancreatic function. Noted to have significant hypocalcemia, hypomagnesemia, and vitamin D deficiency.      #DM  better controlled  -continue with current Lantus 26U qhs  -c/w premeal lispro 6U   -would rec out pt insulin rx  -fsg ac and hs  -dm teaching    #Hypocalcemia  RESOLVED  -Today Ca2+ 7.7 (corrected 9.5), PTH elevated 194, 25-hydroxy VitD decreased 16.5  -Likely contributed by hypomagnesemia (1.4) and low vit d  -Magnesium 1.6 today   -s/p MgSO4 2g IV   -c/w PO Magnesium oxide 400mg TID with meals for 2-3 days  -c/w VitD3 1000U qd   -c/w VitD3 50,000U qweek for 12 weeks     Interval Events: No acute events overnight. Patient has no acute complaints. Reports eating well.       Allergies    No Known Allergies    Intolerances      Endocrine/Metabolic Medications:  insulin glargine Injectable (LANTUS) 10 Unit(s) SubCutaneous at bedtime  insulin lispro (ADMELOG) corrective regimen sliding scale   SubCutaneous three times a day before meals  insulin lispro Injectable (ADMELOG) 6 Unit(s) SubCutaneous three times a day before meals      Vital Signs Last 24 Hrs  T(C): 37 (16 Feb 2023 13:43), Max: 37 (16 Feb 2023 13:43)  T(F): 98.6 (16 Feb 2023 13:43), Max: 98.6 (16 Feb 2023 13:43)  HR: 82 (16 Feb 2023 13:43) (75 - 86)  BP: 125/74 (16 Feb 2023 13:43) (118/62 - 127/63)  BP(mean): 80 (16 Feb 2023 10:58) (80 - 80)  RR: 18 (16 Feb 2023 13:43) (17 - 18)  SpO2: 96% (16 Feb 2023 13:43) (96% - 97%)    Parameters below as of 16 Feb 2023 10:58  Patient On (Oxygen Delivery Method): room air          PHYSICAL EXAM  All physical exam findings normal, except those marked:  General:	Alert, active, cooperative, NAD, well hydrated  .		[] Abnormal:  Neck		Normal: supple, no cervical adenopathy, no palpable thyroid  .		[] Abnormal:  Cardiovascular	Normal: regular rate, normal S1, S2, no murmurs  .		[] Abnormal:  Respiratory	Normal: no chest wall deformity, normal respiratory pattern, CTA B/L  .		[] Abnormal:  Abdominal	Normal: soft, ND, NT, bowel sounds present, no masses, no organomegaly  .		[] Abnormal:  		Normal normal genitalia, testes descended, circumcised/uncircumcised  .		Yina stage:			Breast yina:  .		Menstrual history:  .		[] Abnormal:  Extremities	Normal: FROM x4  .		[] Abnormal:  Skin		Normal: intact and not indurated, no rash, no acanthosis nigricans  .		[x] Abnormal: Jaundice  Neurologic	Normal: grossly intact  .		[] Abnormal:    LABS                        12.0   8.83  )-----------( 196      ( 16 Feb 2023 06:05 )             35.6                               140    |  107    |  21                  Calcium: 7.7   / iCa: x      (02-16 @ 06:05)    ----------------------------<  210       Magnesium: 1.6                              3.8     |  25     |  0.88             Phosphorous: 2.5      TPro  5.0    /  Alb  1.8    /  TBili  5.0    /  DBili  x      /  AST  45     /  ALT  93     /  AlkPhos  181    16 Feb 2023 06:05    CAPILLARY BLOOD GLUCOSE      POCT Blood Glucose.: 262 mg/dL (16 Feb 2023 11:36)  POCT Blood Glucose.: 155 mg/dL (16 Feb 2023 07:42)  POCT Blood Glucose.: 261 mg/dL (15 Feb 2023 21:03)  POCT Blood Glucose.: 206 mg/dL (15 Feb 2023 16:40)        Assesment/plan    Patient states he was diagnosed with diabetes 7-8 years ago and has never been on insulin. Reports taking medications (jardiance and janumet) regularly. Per daughter when the patient started feeling sick around a week before admission he was not taking his medications but was still eating when he could despite poor appetite. At that time the patient's finger sticks at home were noted to be higher than usual. She noticed the patient's skin beginning to turn yellow and reports that the patient was urinating frequently with very yellow and "bubbly" urine. Just prior to coming to the hospital the patient had increased thirst and very dry mouth that made it difficult for him to swallow. Daughter says that she spoke with patient's PCP recently who said that the patient's blood sugars had been well controlled and his last A1c outpatient was 7. Patient eats a regular diet consisting of meat, fish, vegetables and rice and generally avoids sugary foods and sodas. Denies any recent changes to diet. Patient usually walks and does light exercise in the park every morning. Patient says that he has seen ophthalmologist and podiatrist a very long time ago but daughter is unaware if this is true. Development of HHS possibly contributed to by medication non-compliance in setting of recent flu like illness, however CT findings showing cystic lesion in the body of pancreas and increased direct hyperbilirubinemia concerning for a direct process affecting pancreatic function. Noted to have significant hypocalcemia, hypomagnesemia, and vitamin D deficiency.      #DM  better controlled  -continue with current Lantus 26U qhs  -c/w premeal lispro 6U   -would rec out pt insulin rx  -fsg ac and hs  -dm teaching.    #Hypocalcemia  RESOLVED  -Today Ca2+ 7.7 (corrected 9.5), PTH elevated 194, 25-hydroxy VitD decreased 16.5  -Likely contributed by hypomagnesemia (1.4) and low vit d  -Magnesium 1.6 today   -s/p MgSO4 2g IV   -c/w PO Magnesium oxide 400mg TID with meals for 2-3 days  -c/w VitD3 1000U qd   -c/w VitD3 50,000U qweek for 12 weeks

## 2023-02-16 NOTE — PHYSICAL THERAPY INITIAL EVALUATION ADULT - GENERAL OBSERVATIONS, REHAB EVAL
Pt. found lying supine in NAD; cooperative and motivated during eval. Unable to connect to the  for 15 mins and so RN Ping helped in interpretation. Pt. is alert and oriented x 3.

## 2023-02-16 NOTE — PROGRESS NOTE ADULT - SUBJECTIVE AND OBJECTIVE BOX
Chief Complaint:  Patient is a 67y old  Male who presents with a chief complaint of hyperosmolar hyperglycemic state (16 Feb 2023 10:34)      Reason for consult: Hyperbilirubinemia    Interval Events: Remains hemodynamically stable, afebrile. Bilirubin continued to downtrend, Se bi today 5.0.  INR remains normal.     Hospital Medications:  calcium carbonate   1250 mG (OsCal) 1 Tablet(s) Oral two times a day  cholecalciferol 1000 Unit(s) Oral daily  ergocalciferol 89357 Unit(s) Oral <User Schedule>  insulin glargine Injectable (LANTUS) 10 Unit(s) SubCutaneous at bedtime  insulin lispro (ADMELOG) corrective regimen sliding scale   SubCutaneous three times a day before meals  insulin lispro Injectable (ADMELOG) 6 Unit(s) SubCutaneous three times a day before meals  magnesium oxide 400 milliGRAM(s) Oral two times a day with meals  NIFEdipine XL 30 milliGRAM(s) Oral daily  polyethylene glycol 3350 17 Gram(s) Oral daily PRN  potassium phosphate / sodium phosphate Powder (PHOS-NaK) 1 Packet(s) Oral three times a day      ROS:   General:  No  fevers, chills, night sweats, fatigue  Eyes:  Good vision, no reported pain  ENT:  No sore throat, pain, runny nose  CV:  No pain, palpitations  Pulm:  No dyspnea, cough  GI:  See HPI, otherwise negative  :  No  incontinence, nocturia  Muscle:  No pain, weakness  Neuro:  No memory problems  Psych:  No insomnia, mood problems, depression  Endocrine:  No polyuria, polydipsia, cold/heat intolerance  Heme:  No petechiae, ecchymosis, easy bruisability  Skin:  No rash    PHYSICAL EXAM:   Vital Signs:  Vital Signs Last 24 Hrs  T(C): 36.6 (16 Feb 2023 05:36), Max: 36.9 (15 Feb 2023 20:47)  T(F): 97.9 (16 Feb 2023 05:36), Max: 98.5 (15 Feb 2023 20:47)  HR: 86 (16 Feb 2023 10:58) (75 - 86)  BP: 118/62 (16 Feb 2023 10:58) (118/62 - 127/63)  BP(mean): 80 (16 Feb 2023 10:58) (80 - 80)  RR: 18 (16 Feb 2023 05:36) (17 - 18)  SpO2: 97% (16 Feb 2023 10:58) (96% - 97%)    Parameters below as of 16 Feb 2023 10:58  Patient On (Oxygen Delivery Method): room air      Daily     Daily     GENERAL: no acute distress  NEURO: alert, no asterixis  HEENT: anicteric sclera, no conjunctival pallor appreciated  CHEST: no respiratory distress, no accessory muscle use  CARDIAC: regular rate, rhythm  ABDOMEN: soft, non-tender, non-distended, no rebound or guarding  EXTREMITIES: warm, well perfused, no edema  SKIN: no lesions noted    LABS: reviewed                        12.0   8.83  )-----------( 196      ( 16 Feb 2023 06:05 )             35.6     02-16    140  |  107  |  21<H>  ----------------------------<  210<H>  3.8   |  25  |  0.88    Ca    7.7<L>      16 Feb 2023 06:05  Phos  2.5     02-16  Mg     1.6     02-16    TPro  5.0<L>  /  Alb  1.8<L>  /  TBili  5.0<H>  /  DBili  x   /  AST  45<H>  /  ALT  93<H>  /  AlkPhos  181<H>  02-16    LIVER FUNCTIONS - ( 16 Feb 2023 06:05 )  Alb: 1.8 g/dL / Pro: 5.0 g/dL / ALK PHOS: 181 U/L / ALT: 93 U/L DA / AST: 45 U/L / GGT: x             Interval Diagnostic Studies: see sunrise for full report   Chief Complaint:  Patient is a 67y old  Male who presents with a chief complaint of hyperosmolar hyperglycemic state (16 Feb 2023 10:34)    Mandarin  ID [id number]    Reason for consult: Hyperbilirubinemia    Interval Events: Patient was seen and examined at bedside. Remains hemodynamically stable, afebrile. Bilirubin continued to downtrend, Se bi today 5.0.  INR remains normal. Denies abdominal complaints.    Hospital Medications:  calcium carbonate   1250 mG (OsCal) 1 Tablet(s) Oral two times a day  cholecalciferol 1000 Unit(s) Oral daily  ergocalciferol 10175 Unit(s) Oral <User Schedule>  insulin glargine Injectable (LANTUS) 10 Unit(s) SubCutaneous at bedtime  insulin lispro (ADMELOG) corrective regimen sliding scale   SubCutaneous three times a day before meals  insulin lispro Injectable (ADMELOG) 6 Unit(s) SubCutaneous three times a day before meals  magnesium oxide 400 milliGRAM(s) Oral two times a day with meals  NIFEdipine XL 30 milliGRAM(s) Oral daily  polyethylene glycol 3350 17 Gram(s) Oral daily PRN  potassium phosphate / sodium phosphate Powder (PHOS-NaK) 1 Packet(s) Oral three times a day      ROS:   General:  Had fevers and chills at home  Eyes:  Good vision, no reported pain  ENT:  No sore throat, pain, runny nose  CV:  No pain, palpitations  Pulm:  No dyspnea, cough  GI:  No abdominal pain, N/V, reports that has not had BM for few days, but denies any diarrhea prior or any change in urine color.  :  No  dysuria  Muscle:  No pain, weakness  Neuro:  No memory problems  Skin:  No rash        PHYSICAL EXAM:   Vital Signs:  Vital Signs Last 24 Hrs  T(C): 36.6 (16 Feb 2023 05:36), Max: 36.9 (15 Feb 2023 20:47)  T(F): 97.9 (16 Feb 2023 05:36), Max: 98.5 (15 Feb 2023 20:47)  HR: 86 (16 Feb 2023 10:58) (75 - 86)  BP: 118/62 (16 Feb 2023 10:58) (118/62 - 127/63)  BP(mean): 80 (16 Feb 2023 10:58) (80 - 80)  RR: 18 (16 Feb 2023 05:36) (17 - 18)  SpO2: 97% (16 Feb 2023 10:58) (96% - 97%)    Parameters below as of 16 Feb 2023 10:58  Patient On (Oxygen Delivery Method): room air      Daily     Daily     GENERAL: no acute distress  NEURO: AAOx3, no asterixis  HEENT: icteric sclera  CHEST: no respiratory distress, no accessory muscle use  CARDIAC: regular rate, rhythm  ABDOMEN: soft, non-tender, non-distended, no rebound or guarding, BS+  EXTREMITIES: warm, well perfused, no edema  SKIN: no rash    LABS: reviewed                        12.0   8.83  )-----------( 196      ( 16 Feb 2023 06:05 )             35.6     02-16    140  |  107  |  21<H>  ----------------------------<  210<H>  3.8   |  25  |  0.88    Ca    7.7<L>      16 Feb 2023 06:05  Phos  2.5     02-16  Mg     1.6     02-16    TPro  5.0<L>  /  Alb  1.8<L>  /  TBili  5.0<H>  /  DBili  x   /  AST  45<H>  /  ALT  93<H>  /  AlkPhos  181<H>  02-16    LIVER FUNCTIONS - ( 16 Feb 2023 06:05 )  Alb: 1.8 g/dL / Pro: 5.0 g/dL / ALK PHOS: 181 U/L / ALT: 93 U/L DA / AST: 45 U/L / GGT: x             Interval Diagnostic Studies: see sunrise for full report

## 2023-02-16 NOTE — PROGRESS NOTE ADULT - SUBJECTIVE AND OBJECTIVE BOX
NP Note discussed with  primary attending    Patient is a 67y old  Male who presents with a chief complaint of hyperosmolar hyperglycemic state (16 Feb 2023 11:54)      INTERVAL HPI/OVERNIGHT EVENTS: no new complaints    MEDICATIONS  (STANDING):  calcium carbonate   1250 mG (OsCal) 1 Tablet(s) Oral two times a day  cholecalciferol 1000 Unit(s) Oral daily  ergocalciferol 71848 Unit(s) Oral <User Schedule>  insulin glargine Injectable (LANTUS) 10 Unit(s) SubCutaneous at bedtime  insulin lispro (ADMELOG) corrective regimen sliding scale   SubCutaneous three times a day before meals  insulin lispro Injectable (ADMELOG) 6 Unit(s) SubCutaneous three times a day before meals  magnesium oxide 400 milliGRAM(s) Oral two times a day with meals  NIFEdipine XL 30 milliGRAM(s) Oral daily  potassium phosphate / sodium phosphate Powder (PHOS-NaK) 1 Packet(s) Oral three times a day    MEDICATIONS  (PRN):  polyethylene glycol 3350 17 Gram(s) Oral daily PRN Constipation      __________________________________________________  REVIEW OF SYSTEMS:    CONSTITUTIONAL: No fever,   EYES: no acute visual disturbances  NECK: No pain or stiffness  RESPIRATORY: No cough; No shortness of breath  CARDIOVASCULAR: No chest pain, no palpitations  GASTROINTESTINAL: No pain. No nausea or vomiting; No diarrhea   NEUROLOGICAL: No headache or numbness, no tremors  MUSCULOSKELETAL: No joint pain, no muscle pain  GENITOURINARY: no dysuria, no frequency, no hesitancy  PSYCHIATRY: no depression , no anxiety  ALL OTHER  ROS negative        Vital Signs Last 24 Hrs  T(C): 37 (16 Feb 2023 13:43), Max: 37 (16 Feb 2023 13:43)  T(F): 98.6 (16 Feb 2023 13:43), Max: 98.6 (16 Feb 2023 13:43)  HR: 82 (16 Feb 2023 13:43) (75 - 86)  BP: 125/74 (16 Feb 2023 13:43) (118/62 - 127/63)  BP(mean): 80 (16 Feb 2023 10:58) (80 - 80)  RR: 18 (16 Feb 2023 13:43) (17 - 18)  SpO2: 96% (16 Feb 2023 13:43) (96% - 97%)    Parameters below as of 16 Feb 2023 10:58  Patient On (Oxygen Delivery Method): room air        ________________________________________________  PHYSICAL EXAM:  GENERAL: NAD  HEENT: Normocephalic;  conjunctivae and sclerae + jaundice; moist mucous membranes;   NECK : supple  CHEST/LUNG: Clear to ausculitation bilaterally with good air entry   HEART: S1 S2  regular; no murmurs, gallops or rubs  ABDOMEN: Soft, Nontender, Nondistended; Bowel sounds present  EXTREMITIES: no cyanosis; no edema; no calf tenderness  SKIN: + jaundice all over skin, warm and dry; no rash  NERVOUS SYSTEM:  Awake and alert; Oriented  to place, person and time ; no new deficits    _________________________________________________  LABS:                        12.0   8.83  )-----------( 196      ( 16 Feb 2023 06:05 )             35.6     02-16    140  |  107  |  21<H>  ----------------------------<  210<H>  3.8   |  25  |  0.88    Ca    7.7<L>      16 Feb 2023 06:05  Phos  2.5     02-16  Mg     1.6     02-16    TPro  5.0<L>  /  Alb  1.8<L>  /  TBili  5.0<H>  /  DBili  x   /  AST  45<H>  /  ALT  93<H>  /  AlkPhos  181<H>  02-16    PT/INR - ( 16 Feb 2023 06:05 )   PT: 12.2 sec;   INR: 1.02 ratio             CAPILLARY BLOOD GLUCOSE      POCT Blood Glucose.: 262 mg/dL (16 Feb 2023 11:36)  POCT Blood Glucose.: 155 mg/dL (16 Feb 2023 07:42)  POCT Blood Glucose.: 261 mg/dL (15 Feb 2023 21:03)  POCT Blood Glucose.: 206 mg/dL (15 Feb 2023 16:40)        RADIOLOGY & ADDITIONAL TESTS:  < from: MR MRCP w/wo IV Cont (02.14.23 @ 10:30) >    IMPRESSION:  The common bile duct measures up to 6 mm in caliber which is within   normal limits. No evidence for choledocholithiasis.    Trace ascites.    New mild right pleural effusion.    < end of copied text >    Imaging Personally Reviewed:  YES    Consultant(s) Notes Reviewed:   YES    Care Discussed with Consultants :     Plan of care was discussed with patient and /or primary care giver; all questions and concerns were addressed and care was aligned with patient's wishes.

## 2023-02-16 NOTE — PHYSICAL THERAPY INITIAL EVALUATION ADULT - DIAGNOSIS, PT EVAL
(ICF Model) Pt. present w/deficits in Body Structures/Function (Impairments), incl: Strength, Balance, Endurance leading to deficits in performing the below noted Activities (Limitations).

## 2023-02-16 NOTE — PROGRESS NOTE ADULT - SUBJECTIVE AND OBJECTIVE BOX
Patient is a 67y old  Male who presents with a chief complaint of hyperosmolar hyperglycemic state (15 Feb 2023 16:19)    PATIENT IS SEEN AND EXAMINED IN MEDICAL FLOOR.  CHUCK [    ]    SHERMAN [   ]      GT [   ]    ALLERGIES:  No Known Allergies      Daily     Daily     VITALS:    Vital Signs Last 24 Hrs  T(C): 36.6 (16 Feb 2023 05:36), Max: 36.9 (15 Feb 2023 20:47)  T(F): 97.9 (16 Feb 2023 05:36), Max: 98.5 (15 Feb 2023 20:47)  HR: 80 (16 Feb 2023 05:36) (75 - 80)  BP: 120/77 (16 Feb 2023 05:36) (120/77 - 127/63)  BP(mean): --  RR: 18 (16 Feb 2023 05:36) (17 - 18)  SpO2: 96% (16 Feb 2023 05:36) (96% - 97%)    Parameters below as of 16 Feb 2023 05:36  Patient On (Oxygen Delivery Method): room air        LABS:    CBC Full  -  ( 16 Feb 2023 06:05 )  WBC Count : 8.83 K/uL  RBC Count : 3.89 M/uL  Hemoglobin : 12.0 g/dL  Hematocrit : 35.6 %  Platelet Count - Automated : 196 K/uL  Mean Cell Volume : 91.5 fl  Mean Cell Hemoglobin : 30.8 pg  Mean Cell Hemoglobin Concentration : 33.7 gm/dL  Auto Neutrophil # : x  Auto Lymphocyte # : x  Auto Monocyte # : x  Auto Eosinophil # : x  Auto Basophil # : x  Auto Neutrophil % : x  Auto Lymphocyte % : x  Auto Monocyte % : x  Auto Eosinophil % : x  Auto Basophil % : x    PT/INR - ( 16 Feb 2023 06:05 )   PT: 12.2 sec;   INR: 1.02 ratio           02-16    140  |  107  |  21<H>  ----------------------------<  210<H>  3.8   |  25  |  0.88    Ca    7.7<L>      16 Feb 2023 06:05  Phos  2.5     02-16  Mg     1.6     02-16    TPro  5.0<L>  /  Alb  1.8<L>  /  TBili  5.0<H>  /  DBili  x   /  AST  45<H>  /  ALT  93<H>  /  AlkPhos  181<H>  02-16    CAPILLARY BLOOD GLUCOSE      POCT Blood Glucose.: 155 mg/dL (16 Feb 2023 07:42)  POCT Blood Glucose.: 261 mg/dL (15 Feb 2023 21:03)  POCT Blood Glucose.: 206 mg/dL (15 Feb 2023 16:40)  POCT Blood Glucose.: 155 mg/dL (15 Feb 2023 11:26)        LIVER FUNCTIONS - ( 16 Feb 2023 06:05 )  Alb: 1.8 g/dL / Pro: 5.0 g/dL / ALK PHOS: 181 U/L / ALT: 93 U/L DA / AST: 45 U/L / GGT: x           Creatinine Trend: 0.88<--, 0.70<--, 0.73<--, 0.99<--, 1.36<--, 1.32<--  I&O's Summary    15 Feb 2023 07:01  -  16 Feb 2023 07:00  --------------------------------------------------------  IN: 0 mL / OUT: 1250 mL / NET: -1250 mL            Clean Catch Clean Catch (Midstream)  02-10 @ 02:35   <10,000 CFU/mL Normal Urogenital Nerissa  --  --          MEDICATIONS:    MEDICATIONS  (STANDING):  calcium carbonate   1250 mG (OsCal) 1 Tablet(s) Oral two times a day  cholecalciferol 1000 Unit(s) Oral daily  ergocalciferol 15593 Unit(s) Oral <User Schedule>  insulin glargine Injectable (LANTUS) 10 Unit(s) SubCutaneous at bedtime  insulin lispro (ADMELOG) corrective regimen sliding scale   SubCutaneous three times a day before meals  insulin lispro Injectable (ADMELOG) 6 Unit(s) SubCutaneous three times a day before meals  magnesium oxide 400 milliGRAM(s) Oral two times a day with meals  NIFEdipine XL 30 milliGRAM(s) Oral daily  potassium phosphate / sodium phosphate Powder (PHOS-NaK) 1 Packet(s) Oral three times a day      MEDICATIONS  (PRN):  polyethylene glycol 3350 17 Gram(s) Oral daily PRN Constipation      REVIEW OF SYSTEMS:                           ALL ROS DONE [ X   ]    CONSTITUTIONAL:  LETHARGIC [   ], FEVER [   ], UNRESPONSIVE [   ]  CVS:  CP  [   ], SOB, [   ], PALPITATIONS [   ], DIZZYNESS [   ]  RS: COUGH [   ], SPUTUM [   ]  GI: ABDOMINAL PAIN [   ], NAUSEA [   ], VOMITINGS [   ], DIARRHEA [   ], CONSTIPATION [   ]  :  DYSURIA [   ], NOCTURIA [   ], INCREASED FREQUENCY [   ], DRIBLING [   ],  SKELETAL: PAINFUL JOINTS [   ], SWOLLEN JOINTS [   ], NECK ACHE [   ], LOW BACK ACHE [   ],  SKIN : ULCERS [   ], RASH [   ], ITCHING [   ]  CNS: HEAD ACHE [   ], DOUBLE VISION [   ], BLURRED VISION [   ], AMS / CONFUSION [   ], SEIZURES [   ], WEAKNESS [   ],TINGLING / NUMBNESS [   ]    PHYSICAL EXAMINATION:  GENERAL APPEARANCE: NO DISTRESS  HEENT:  NO PALLOR, NO  JVD,  NO   NODES, NECK SUPPLE  CVS: S1 +, S2 +,   RS: AEEB,  OCCASIONAL  RALES +,   NO RONCHI  ABD: SOFT, NO, BS +   ;  TTP IN EPIGASTRIC/LUQ REGION +  EXT: NO PE  SKIN: WARM,  JAUNDICE  SKELETAL:  ROM ACCEPTABLE  CNS:  AAO X 2-3    RADIOLOGY :    RADIOLOGY AND READINGS REVIEWED    ASSESSMENT :       PLAN:  HPI:  67 year old male PMH CAD s/p CABG, DM, HLD coming in with jaundice and generalized body aches. Patient is lethargic and unable to provide much history. As per ED provider, " his symptoms started about 5 days ago initially with flu-like symptoms and vomiting. went to PMD and was given a nausea medication and felt improved but now feeling worse again. daughter states jaundice is also getting worse. has lost about 10-15lbs in the past week."     (10 Feb 2023 02:52)    # CASE D/W PATIENT'S DAUGHTER FINESSE [place]O [ALSO @ [phone]] AT BEDSIDE ALL QUESTIONS ANSWERED [2/14]  # CASE D/W PATIENT VIA [employer] , JESSY DENNY # [id number]    # HHS - RESOLVED  # DM  - ON LANTUS, TID INSULIN, SSI + FS  - HBA1C - 9.1  - ENDOCRINOLOGY CONSULT PLACED    # SEPSIS S/T SUSPECTED PNEUMONIA  - PLACED ON ROCEPHIN, BCX - NGTD    # KYLE - RESOLVED  # HYPERNATREMIA  - S/P IVF  - STRICT IS AN OS  - MONITOR CR  - AVOID NEPHROTOXIC AGENTS  - NEPHROLOGY CONSULT    # HYPOKALEMIA, HYPOMAGNESEMIA  - REPLETING WITH SUPPLEMENT    # JAUNDICE, HYPERBILIRUBINEMIA   - NOTED CT A/P  - NOTED MRCP  - F/U HEPATITIS PANEL, AUTOIMMUNE WORKUP, TUMOR MARKERS, IRON PANEL  - GI CONSULT IN PROGRESS  - HEPATOLOGY CONSULT IN PROGRESS    - PATIENT REPORTS ONLY SUPPLEMENT HE USES IS GINSENG    # CAD s/p CABG  # HTN  - ON PLAVIX  - ON NIFEDIPINE    # HYPOCALCEMIA  # VIT D. DEFICIENCY  - F/U IONIZED CALCIUM  - NOTED PTH, 25OH VIT D  - PLACED ON VIT D AND CALCIUM    # HYPOMAGNESEMIA  - REPLETING WITH SUPPLEMENT    # ? A.FIB  - ON XARELTO, HOLD  - DAUGHTER UNSURE, WILL OBTAIN OUTPATIENT RECORDS IF POSSIBLE    # HLD - ON STATIN, HOLD    # GI PPX   Patient is a 67y old  Male who presents with a chief complaint of hyperosmolar hyperglycemic state (15 Feb 2023 16:19)    PATIENT IS SEEN AND EXAMINED IN MEDICAL FLOOR. PATIENT REPORTS IMPROVEMENT IN ABDOMINAL PAIN.    ALLERGIES:  No Known Allergies    VITALS:    Vital Signs Last 24 Hrs  T(C): 36.6 (16 Feb 2023 05:36), Max: 36.9 (15 Feb 2023 20:47)  T(F): 97.9 (16 Feb 2023 05:36), Max: 98.5 (15 Feb 2023 20:47)  HR: 80 (16 Feb 2023 05:36) (75 - 80)  BP: 120/77 (16 Feb 2023 05:36) (120/77 - 127/63)  BP(mean): --  RR: 18 (16 Feb 2023 05:36) (17 - 18)  SpO2: 96% (16 Feb 2023 05:36) (96% - 97%)    Parameters below as of 16 Feb 2023 05:36  Patient On (Oxygen Delivery Method): room air        LABS:    CBC Full  -  ( 16 Feb 2023 06:05 )  WBC Count : 8.83 K/uL  RBC Count : 3.89 M/uL  Hemoglobin : 12.0 g/dL  Hematocrit : 35.6 %  Platelet Count - Automated : 196 K/uL  Mean Cell Volume : 91.5 fl  Mean Cell Hemoglobin : 30.8 pg  Mean Cell Hemoglobin Concentration : 33.7 gm/dL  Auto Neutrophil # : x  Auto Lymphocyte # : x  Auto Monocyte # : x  Auto Eosinophil # : x  Auto Basophil # : x  Auto Neutrophil % : x  Auto Lymphocyte % : x  Auto Monocyte % : x  Auto Eosinophil % : x  Auto Basophil % : x    PT/INR - ( 16 Feb 2023 06:05 )   PT: 12.2 sec;   INR: 1.02 ratio           02-16    140  |  107  |  21<H>  ----------------------------<  210<H>  3.8   |  25  |  0.88    Ca    7.7<L>      16 Feb 2023 06:05  Phos  2.5     02-16  Mg     1.6     02-16    TPro  5.0<L>  /  Alb  1.8<L>  /  TBili  5.0<H>  /  DBili  x   /  AST  45<H>  /  ALT  93<H>  /  AlkPhos  181<H>  02-16    CAPILLARY BLOOD GLUCOSE      POCT Blood Glucose.: 155 mg/dL (16 Feb 2023 07:42)  POCT Blood Glucose.: 261 mg/dL (15 Feb 2023 21:03)  POCT Blood Glucose.: 206 mg/dL (15 Feb 2023 16:40)  POCT Blood Glucose.: 155 mg/dL (15 Feb 2023 11:26)        LIVER FUNCTIONS - ( 16 Feb 2023 06:05 )  Alb: 1.8 g/dL / Pro: 5.0 g/dL / ALK PHOS: 181 U/L / ALT: 93 U/L DA / AST: 45 U/L / GGT: x           Creatinine Trend: 0.88<--, 0.70<--, 0.73<--, 0.99<--, 1.36<--, 1.32<--  I&O's Summary    15 Feb 2023 07:01  -  16 Feb 2023 07:00  --------------------------------------------------------  IN: 0 mL / OUT: 1250 mL / NET: -1250 mL            Clean Catch Clean Catch (Midstream)  02-10 @ 02:35   <10,000 CFU/mL Normal Urogenital Nerissa  --  --          MEDICATIONS:    MEDICATIONS  (STANDING):  calcium carbonate   1250 mG (OsCal) 1 Tablet(s) Oral two times a day  cholecalciferol 1000 Unit(s) Oral daily  ergocalciferol 72456 Unit(s) Oral <User Schedule>  insulin glargine Injectable (LANTUS) 10 Unit(s) SubCutaneous at bedtime  insulin lispro (ADMELOG) corrective regimen sliding scale   SubCutaneous three times a day before meals  insulin lispro Injectable (ADMELOG) 6 Unit(s) SubCutaneous three times a day before meals  magnesium oxide 400 milliGRAM(s) Oral two times a day with meals  NIFEdipine XL 30 milliGRAM(s) Oral daily  potassium phosphate / sodium phosphate Powder (PHOS-NaK) 1 Packet(s) Oral three times a day      MEDICATIONS  (PRN):  polyethylene glycol 3350 17 Gram(s) Oral daily PRN Constipation      REVIEW OF SYSTEMS:                           ALL ROS DONE [ X   ]    CONSTITUTIONAL:  LETHARGIC [   ], FEVER [   ], UNRESPONSIVE [   ]  CVS:  CP  [   ], SOB, [   ], PALPITATIONS [   ], DIZZYNESS [   ]  RS: COUGH [   ], SPUTUM [   ]  GI: ABDOMINAL PAIN [   ], NAUSEA [   ], VOMITINGS [   ], DIARRHEA [   ], CONSTIPATION [   ]  :  DYSURIA [   ], NOCTURIA [   ], INCREASED FREQUENCY [   ], DRIBLING [   ],  SKELETAL: PAINFUL JOINTS [   ], SWOLLEN JOINTS [   ], NECK ACHE [   ], LOW BACK ACHE [   ],  SKIN : ULCERS [   ], RASH [   ], ITCHING [   ]  CNS: HEAD ACHE [   ], DOUBLE VISION [   ], BLURRED VISION [   ], AMS / CONFUSION [   ], SEIZURES [   ], WEAKNESS [   ],TINGLING / NUMBNESS [   ]    PHYSICAL EXAMINATION:  GENERAL APPEARANCE: NO DISTRESS  HEENT:  NO PALLOR, NO  JVD,  NO   NODES, NECK SUPPLE  CVS: S1 +, S2 +,   RS: AEEB,  OCCASIONAL  RALES +,   NO RONCHI  ABD: SOFT, NO, BS +   ;  TTP IN EPIGASTRIC/LUQ REGION +  EXT: NO PE  SKIN: WARM,  JAUNDICE  SKELETAL:  ROM ACCEPTABLE  CNS:  AAO X 2-3    RADIOLOGY :    RADIOLOGY AND READINGS REVIEWED    ASSESSMENT :       PLAN:  HPI:  67 year old male PMH CAD s/p CABG, DM, HLD coming in with jaundice and generalized body aches. Patient is lethargic and unable to provide much history. As per ED provider, " his symptoms started about 5 days ago initially with flu-like symptoms and vomiting. went to PMD and was given a nausea medication and felt improved but now feeling worse again. daughter states jaundice is also getting worse. has lost about 10-15lbs in the past week."     (10 Feb 2023 02:52)    # CASE D/W PATIENT'S DAUGHTER FINESSE [place] [ALSO @ [phone]] AT BEDSIDE ALL QUESTIONS ANSWERED [2/14]  # CASE D/W PATIENT VIA [employer] , GERALD ID [id number]    # HHS - RESOLVED  # DM  - ON LANTUS, TID INSULIN, SSI + FS  - HBA1C - 9.1  - ENDOCRINOLOGY CONSULT PLACED    # SEPSIS S/T SUSPECTED PNEUMONIA  - PLACED ON ROCEPHIN, BCX - NGTD    # KYLE - RESOLVED  # HYPERNATREMIA  - S/P IVF  - STRICT IS AN OS  - MONITOR CR  - AVOID NEPHROTOXIC AGENTS  - NEPHROLOGY CONSULT    # HYPOKALEMIA, HYPOMAGNESEMIA  - REPLETING WITH SUPPLEMENT    # JAUNDICE, HYPERBILIRUBINEMIA   - NOTED CT A/P  - NOTED MRCP  - F/U HEPATITIS PANEL, AUTOIMMUNE WORKUP, TUMOR MARKERS, IRON PANEL  - GI CONSULT IN PROGRESS  - HEPATOLOGY CONSULT IN PROGRESS    - D/W HEPATOLOGY, REGARDING BIOPSY, WILL CONTINUE TO TREND LFTS, PRIOR TO PURSUING BIOPSY    - PATIENT REPORTS ONLY SUPPLEMENT HE USES IS GINSENG    # CAD s/p CABG  # HTN  - ON PLAVIX  - ON NIFEDIPINE    # HYPOCALCEMIA  # VIT D. DEFICIENCY  - F/U IONIZED CALCIUM  - NOTED PTH, 25OH VIT D  - PLACED ON VIT D AND CALCIUM    # HYPOMAGNESEMIA  - REPLETING WITH SUPPLEMENT    # ? A.FIB  - ON XARELTO, HOLD  - DAUGHTER UNSURE, WILL OBTAIN OUTPATIENT RECORDS IF POSSIBLE    # HLD - ON STATIN, HOLD    # GI PPX

## 2023-02-16 NOTE — PROGRESS NOTE ADULT - ASSESSMENT
67y Male with Hx of HTN, uncontrolled Type 2 DM (Hba1c 9.1), HLD, CAD s/p CABG presented to [facility] ED on 2/10/23 with 5 days Hx malaise, generalized body aches, initially flu like symptoms, nausea, vomiting, 10-15 lb weight loss in a week and jaundice, and was admitted to MICU with HHS with severe hypokalemia, KYLE and jaundice / mostly direct hyperbilirubinemia but without obstruction on MRCP, and sepsis due to pneumonia (?).      Mostly direct hyperbilirubinemia, with mixed pattern mild liver enzyme elevation  - No obstruction on MRCP  - The suspected sepsis per notes, and cholestasis of sepsis possibly contributed, especially since improving with antibiotics and post HHS management  - Follow up / obtain further AI workup. So far MARA 1:80, weak pos, SMA neg, AMA neg. Please, obtain LKM, Ig panel, ANCA.   - Consider sending complete Hep B serology (patient is from endemic area - need Hep B core antibody total to exclude prior Hep B exposure), Hep E IgM/PCR, EBV and CMV PCRs.  - Ferritin, iron sat high, but less informative in setting of sepsis, would repeat once acute issues resolved, and if remains elevated, consider HFE mutation analysis  - C/w antibiotics per primary team - s/p 7 days ceftriaxone. No BCx found.  Had neg CXR on admission, but CT a/p suggested tree in bud opacities. CT chest today showed small b/l pleural effusions, small pericardial effusion, both new since 2/10, and ill defined patchy ground glass opacity in KELLY.   - Normal LVEF, RV function on TTE 2/13  - Avoid hepatotoxic medications. Agree holding statin for now. Continue holding allopurinol. Verify home medications. Denies herbal supplements, reports in past, but not recently.  - Given normal INR, continued improvement, can monitor LFTs closely, but if no further improvement or worsening, might need liver biopsy    Thank you for consult  Will continue to monitor  D/w primary team   67y Male with Hx of HTN, uncontrolled Type 2 DM (Hba1c 9.1), HLD, CAD s/p CABG presented to [facility] ED on 2/10/23 with 5 days Hx malaise, generalized body aches, initially flu like symptoms, nausea, vomiting, 10-15 lb weight loss in a week and jaundice, and was admitted to MICU with HHS with severe hypokalemia, KYLE and jaundice / mostly direct hyperbilirubinemia but without obstruction on MRCP, and sepsis due to pneumonia (?).      Mostly direct hyperbilirubinemia, with mixed pattern mild liver enzyme elevation  - No obstruction on MRCP  - The suspected sepsis per notes, and cholestasis of sepsis possibly contributed, especially since improving with antibiotics and post HHS management.   - Follow up / obtain further AI workup. So far MARA 1:80, weak pos, SMA neg, AMA neg. Please, obtain LKM, Ig panel, ANCA.   - Consider sending complete Hep B serology (patient is from endemic area - need Hep B core antibody total to exclude prior Hep B exposure), Hep E IgM/PCR, EBV, CMV PCRs, less likely but also obtain VZV, HSV PCRs.  - Ferritin, iron sat high, but less informative in setting of sepsis, would repeat once acute issues resolved, and if remains elevated, consider HFE mutation analysis  - C/w antibiotics per primary team - s/p 7 days ceftriaxone. No BCx found.  Had neg CXR on admission, but CT a/p suggested tree in bud opacities. CT chest today showed small b/l pleural effusions, small pericardial effusion, both new since 2/10, and ill defined patchy ground glass opacity in KELLY.  Consider ID/pulm. eval. Atypical pneumonia, Mycoplasma/Chlamydia can be a/w hepatitis.  - Normal LVEF, RV function on TTE 2/13  - Avoid hepatotoxic medications. Agree holding statin for now. Continue holding allopurinol. Verify home medications. Denies herbal supplements, reports in past, but not recently.  - Given normal INR, continued improvement, can monitor LFTs closely, but if no further improvement or worsening, might need liver biopsy    Thank you for consult  Will continue to monitor  D/w primary team

## 2023-02-16 NOTE — PROGRESS NOTE ADULT - SUBJECTIVE AND OBJECTIVE BOX
C A R D I O L O G Y  **********************************    DATE OF SERVICE: 02-16-23    Patient denies chest pain or shortness of breath.   Review of symptoms otherwise negative.    calcium carbonate   1250 mG (OsCal) 1 Tablet(s) Oral two times a day  cholecalciferol 1000 Unit(s) Oral daily  ergocalciferol 32649 Unit(s) Oral <User Schedule>  insulin glargine Injectable (LANTUS) 10 Unit(s) SubCutaneous at bedtime  insulin lispro (ADMELOG) corrective regimen sliding scale   SubCutaneous three times a day before meals  insulin lispro Injectable (ADMELOG) 6 Unit(s) SubCutaneous three times a day before meals  magnesium oxide 400 milliGRAM(s) Oral two times a day with meals  NIFEdipine XL 30 milliGRAM(s) Oral daily  polyethylene glycol 3350 17 Gram(s) Oral daily PRN  potassium phosphate / sodium phosphate Powder (PHOS-NaK) 1 Packet(s) Oral three times a day                            12.0   8.83  )-----------( 196      ( 16 Feb 2023 06:05 )             35.6       Hemoglobin: 12.0 g/dL (02-16 @ 06:05)  Hemoglobin: 13.1 g/dL (02-15 @ 06:58)  Hemoglobin: 13.4 g/dL (02-14 @ 06:01)  Hemoglobin: 13.2 g/dL (02-13 @ 06:03)  Hemoglobin: 13.5 g/dL (02-12 @ 07:16)      02-16    140  |  107  |  21<H>  ----------------------------<  210<H>  3.8   |  25  |  0.88    Ca    7.7<L>      16 Feb 2023 06:05  Phos  2.5     02-16  Mg     1.6     02-16    TPro  5.0<L>  /  Alb  1.8<L>  /  TBili  5.0<H>  /  DBili  x   /  AST  45<H>  /  ALT  93<H>  /  AlkPhos  181<H>  02-16    Creatinine Trend: 0.88<--, 0.70<--, 0.73<--, 0.99<--, 1.36<--, 1.32<--    COAGS: PT/INR - ( 16 Feb 2023 06:05 )   PT: 12.2 sec;   INR: 1.02 ratio                   T(C): 37 (02-16-23 @ 13:43), Max: 37 (02-16-23 @ 13:43)  HR: 82 (02-16-23 @ 13:43) (75 - 86)  BP: 125/74 (02-16-23 @ 13:43) (118/62 - 127/63)  RR: 18 (02-16-23 @ 13:43) (17 - 18)  SpO2: 96% (02-16-23 @ 13:43) (96% - 97%)  Wt(kg): --    I&O's Summary    15 Feb 2023 07:01  -  16 Feb 2023 07:00  --------------------------------------------------------  IN: 0 mL / OUT: 1250 mL / NET: -1250 mL            HEENT:  (-)icterus (-)pallor  CV: N S1 S2 1/6 DARIEN (+)2 Pulses B/l  Resp:  Clear to ausculatation B/L, normal effort  GI: (+) BS Soft, NT, ND  Lymph:  (-)Edema, (-)obvious lymphadenopathy  Skin: Warm to touch, Normal turgor  Psych: Appropriate mood and affect          ASSESSMENT/PLAN: 	67y  Male   PMH CAD s/p CABG, DM, HLD coming in with jaundice and generalized body aches.    #CAD  - ideally should be on an ASA and statin once plts and LFTs are stable  - echo with no pertinent findings      # ? PAF  - xarelto on hold  - Obtain outpt records    # HTN  - BP currently acceptable  - Monitor For now    # Jaundice  - GI f/u  - MRCP noted   - F/U CT abd    Arben Bhardwaj MD, [facility]  BEEPER [phone]

## 2023-02-17 LAB
ALBUMIN SERPL ELPH-MCNC: 1.8 G/DL — LOW (ref 3.5–5)
ALP SERPL-CCNC: 156 U/L — HIGH (ref 40–120)
ALT FLD-CCNC: 77 U/L DA — HIGH (ref 10–60)
ANION GAP SERPL CALC-SCNC: 6 MMOL/L — SIGNIFICANT CHANGE UP (ref 5–17)
APTT BLD: 30.5 SEC — SIGNIFICANT CHANGE UP (ref 27.5–35.5)
AST SERPL-CCNC: 39 U/L — SIGNIFICANT CHANGE UP (ref 10–40)
BILIRUB SERPL-MCNC: 4.1 MG/DL — HIGH (ref 0.2–1.2)
BUN SERPL-MCNC: 19 MG/DL — HIGH (ref 7–18)
CALCIUM SERPL-MCNC: 8 MG/DL — LOW (ref 8.4–10.5)
CHLORIDE SERPL-SCNC: 107 MMOL/L — SIGNIFICANT CHANGE UP (ref 96–108)
CMV DNA CSF QL NAA+PROBE: SIGNIFICANT CHANGE UP
CMV DNA SPEC NAA+PROBE-LOG#: SIGNIFICANT CHANGE UP LOG10IU/ML
CO2 SERPL-SCNC: 27 MMOL/L — SIGNIFICANT CHANGE UP (ref 22–31)
CREAT SERPL-MCNC: 0.67 MG/DL — SIGNIFICANT CHANGE UP (ref 0.5–1.3)
EBV DNA SERPL NAA+PROBE-ACNC: SIGNIFICANT CHANGE UP IU/ML
EBVPCR LOG: SIGNIFICANT CHANGE UP LOG10IU/ML
EGFR: 102 ML/MIN/1.73M2 — SIGNIFICANT CHANGE UP
GLUCOSE BLDC GLUCOMTR-MCNC: 125 MG/DL — HIGH (ref 70–99)
GLUCOSE BLDC GLUCOMTR-MCNC: 190 MG/DL — HIGH (ref 70–99)
GLUCOSE BLDC GLUCOMTR-MCNC: 196 MG/DL — HIGH (ref 70–99)
GLUCOSE BLDC GLUCOMTR-MCNC: 242 MG/DL — HIGH (ref 70–99)
GLUCOSE BLDC GLUCOMTR-MCNC: 242 MG/DL — HIGH (ref 70–99)
GLUCOSE SERPL-MCNC: 169 MG/DL — HIGH (ref 70–99)
HCT VFR BLD CALC: 35.5 % — LOW (ref 39–50)
HCV RNA SPEC NAA+PROBE-LOG IU: SIGNIFICANT CHANGE UP
HCV RNA SPEC NAA+PROBE-LOG IU: SIGNIFICANT CHANGE UP LOGIU/ML
HGB BLD-MCNC: 11.6 G/DL — LOW (ref 13–17)
HSV DNA1: SIGNIFICANT CHANGE UP
HSV DNA2: SIGNIFICANT CHANGE UP
HSV1 DNA BLD QL NAA+PROBE: SIGNIFICANT CHANGE UP
HSV2 DNA BLD QL NAA+PROBE: SIGNIFICANT CHANGE UP
INR BLD: 1.04 RATIO — SIGNIFICANT CHANGE UP (ref 0.88–1.16)
LKM AB SER-ACNC: <20.1 UNITS — SIGNIFICANT CHANGE UP (ref 0–20)
MAGNESIUM SERPL-MCNC: 1.6 MG/DL — SIGNIFICANT CHANGE UP (ref 1.6–2.6)
MCHC RBC-ENTMCNC: 30.5 PG — SIGNIFICANT CHANGE UP (ref 27–34)
MCHC RBC-ENTMCNC: 32.7 GM/DL — SIGNIFICANT CHANGE UP (ref 32–36)
MCV RBC AUTO: 93.4 FL — SIGNIFICANT CHANGE UP (ref 80–100)
NRBC # BLD: 0 /100 WBCS — SIGNIFICANT CHANGE UP (ref 0–0)
PHOSPHATE SERPL-MCNC: 2.3 MG/DL — LOW (ref 2.5–4.5)
PLATELET # BLD AUTO: 236 K/UL — SIGNIFICANT CHANGE UP (ref 150–400)
POTASSIUM SERPL-MCNC: 3.9 MMOL/L — SIGNIFICANT CHANGE UP (ref 3.5–5.3)
POTASSIUM SERPL-SCNC: 3.9 MMOL/L — SIGNIFICANT CHANGE UP (ref 3.5–5.3)
PROT SERPL-MCNC: 5 G/DL — LOW (ref 6–8.3)
PROTHROM AB SERPL-ACNC: 12.4 SEC — SIGNIFICANT CHANGE UP (ref 10.5–13.4)
RBC # BLD: 3.8 M/UL — LOW (ref 4.2–5.8)
RBC # FLD: 13 % — SIGNIFICANT CHANGE UP (ref 10.3–14.5)
SODIUM SERPL-SCNC: 140 MMOL/L — SIGNIFICANT CHANGE UP (ref 135–145)
WBC # BLD: 7.24 K/UL — SIGNIFICANT CHANGE UP (ref 3.8–10.5)
WBC # FLD AUTO: 7.24 K/UL — SIGNIFICANT CHANGE UP (ref 3.8–10.5)

## 2023-02-17 PROCEDURE — 99233 SBSQ HOSP IP/OBS HIGH 50: CPT

## 2023-02-17 RX ORDER — RIVAROXABAN 15 MG-20MG
1 KIT ORAL
Qty: 0 | Refills: 0 | DISCHARGE

## 2023-02-17 RX ORDER — ALLOPURINOL 300 MG
1 TABLET ORAL
Qty: 0 | Refills: 0 | DISCHARGE

## 2023-02-17 RX ORDER — SITAGLIPTIN AND METFORMIN HYDROCHLORIDE 500; 50 MG/1; MG/1
1 TABLET, FILM COATED ORAL
Qty: 0 | Refills: 0 | DISCHARGE

## 2023-02-17 RX ORDER — INSULIN LISPRO 100/ML
8 VIAL (ML) SUBCUTANEOUS
Refills: 0 | Status: DISCONTINUED | OUTPATIENT
Start: 2023-02-17 | End: 2023-02-18

## 2023-02-17 RX ORDER — CEFTRIAXONE 500 MG/1
1000 INJECTION, POWDER, FOR SOLUTION INTRAMUSCULAR; INTRAVENOUS EVERY 24 HOURS
Refills: 0 | Status: DISCONTINUED | OUTPATIENT
Start: 2023-02-17 | End: 2023-02-18

## 2023-02-17 RX ORDER — POTASSIUM PHOSPHATE, MONOBASIC POTASSIUM PHOSPHATE, DIBASIC 236; 224 MG/ML; MG/ML
15 INJECTION, SOLUTION INTRAVENOUS ONCE
Refills: 0 | Status: COMPLETED | OUTPATIENT
Start: 2023-02-17 | End: 2023-02-17

## 2023-02-17 RX ORDER — DEXLANSOPRAZOLE 30 MG/1
1 CAPSULE, DELAYED RELEASE ORAL
Qty: 0 | Refills: 0 | DISCHARGE

## 2023-02-17 RX ORDER — EMPAGLIFLOZIN 10 MG/1
1 TABLET, FILM COATED ORAL
Qty: 0 | Refills: 0 | DISCHARGE

## 2023-02-17 RX ORDER — NIFEDIPINE 30 MG
1 TABLET, EXTENDED RELEASE 24 HR ORAL
Qty: 0 | Refills: 0 | DISCHARGE

## 2023-02-17 RX ORDER — PITAVASTATIN CALCIUM 1.04 MG/1
1 TABLET, FILM COATED ORAL
Qty: 0 | Refills: 0 | DISCHARGE

## 2023-02-17 RX ORDER — CLOPIDOGREL BISULFATE 75 MG/1
1 TABLET, FILM COATED ORAL
Qty: 0 | Refills: 0 | DISCHARGE

## 2023-02-17 RX ADMIN — MAGNESIUM OXIDE 400 MG ORAL TABLET 400 MILLIGRAM(S): 241.3 TABLET ORAL at 17:12

## 2023-02-17 RX ADMIN — Medication 4: at 07:53

## 2023-02-17 RX ADMIN — Medication 1 TABLET(S): at 05:43

## 2023-02-17 RX ADMIN — Medication 6 UNIT(S): at 17:12

## 2023-02-17 RX ADMIN — Medication 30 MILLIGRAM(S): at 05:43

## 2023-02-17 RX ADMIN — Medication 1 TABLET(S): at 17:11

## 2023-02-17 RX ADMIN — MAGNESIUM OXIDE 400 MG ORAL TABLET 400 MILLIGRAM(S): 241.3 TABLET ORAL at 07:56

## 2023-02-17 RX ADMIN — POTASSIUM PHOSPHATE, MONOBASIC POTASSIUM PHOSPHATE, DIBASIC 62.5 MILLIMOLE(S): 236; 224 INJECTION, SOLUTION INTRAVENOUS at 09:55

## 2023-02-17 RX ADMIN — Medication 1 PACKET(S): at 13:09

## 2023-02-17 RX ADMIN — Medication 6 UNIT(S): at 07:53

## 2023-02-17 RX ADMIN — Medication 1 PACKET(S): at 05:43

## 2023-02-17 RX ADMIN — INSULIN GLARGINE 10 UNIT(S): 100 INJECTION, SOLUTION SUBCUTANEOUS at 22:03

## 2023-02-17 RX ADMIN — Medication 6 UNIT(S): at 12:18

## 2023-02-17 RX ADMIN — Medication 1 PACKET(S): at 22:02

## 2023-02-17 RX ADMIN — Medication 2: at 12:17

## 2023-02-17 RX ADMIN — CEFTRIAXONE 100 MILLIGRAM(S): 500 INJECTION, POWDER, FOR SOLUTION INTRAMUSCULAR; INTRAVENOUS at 18:17

## 2023-02-17 RX ADMIN — Medication 1000 UNIT(S): at 12:19

## 2023-02-17 RX ADMIN — Medication 2: at 17:12

## 2023-02-17 NOTE — PROGRESS NOTE ADULT - PROBLEM SELECTOR PROBLEM 2
Hyperosmolar hyperglycemic state (HHS)
Hyperosmolar hyperglycemic state (HHS)
Jaundice
Jaundice
Hyperosmolar hyperglycemic state (HHS)

## 2023-02-17 NOTE — DISCHARGE NOTE PROVIDER - DETAILS OF MALNUTRITION DIAGNOSIS/DIAGNOSES
This patient has been assessed with a concern for Malnutrition and was treated during this hospitalization for the following Nutrition diagnosis/diagnoses:     -  02/14/2023: Severe protein-calorie malnutrition   -  02/14/2023: Underweight (BMI < 19)

## 2023-02-17 NOTE — PROGRESS NOTE ADULT - PROBLEM SELECTOR PLAN 5
S/P CABBG  c/w  Plavix  Cardio Dr. Bhardwaj following
- On Xarelto at home  - On hold until PCP verifies why pt is on Xarelta.  - Called pt's PCP Dr. Contreras @ [phone], unable to reach anyone.  - F/U Echo  - Cardio Dr. Bhardwaj following
S/P CABBG  c/w  Plavix  Cardio Dr. Bhardwaj following
S/P CABBG  c/w  Plavix  Cardio Dr. Bhardwaj following
- On Xarelto at home  - On hold until PCP verifies why pt is on Xarelta.  - Called pt's PCP Dr. Contreras @ [phone], unable to reach anyone.  - Echo showing normal left ventricular systolic function , EF 55-60%  - Cardio Dr. Bhardwaj following

## 2023-02-17 NOTE — DISCHARGE NOTE PROVIDER - NSDCMRMEDTOKEN_GEN_ALL_CORE_FT
allopurinol 100 mg oral tablet: 1 tab(s) orally once a day  dexlansoprazole 30 mg oral delayed release capsule: 1 cap(s) orally once a day  Janumet 50 mg-500 mg oral tablet: 1 tab(s) orally 2 times a day  Jardiance 10 mg oral tablet: 1 tab(s) orally once a day (in the morning)  NIFEdipine 30 mg oral tablet, extended release: 1 tab(s) orally once a day  pitavastatin (as magnesium) 4 mg oral tablet: 1 tab(s) orally once a day  Plavix 75 mg oral tablet: 1 tab(s) orally once a day  Xarelto 2.5 mg oral tablet: 1 tab(s) orally once a day   Rolling walker:    Aspirin Enteric Coated 81 mg oral delayed release tablet: 1 tab(s) orally once a day   calcium carbonate 1250 mg (500 mg elemental calcium) oral tablet: 1 tab(s) orally 2 times a day  cholecalciferol oral tablet: 58067 unit(s) orally once a week for 12 weeks  insulin glargine 100 units/mL subcutaneous solution: 12 unit(s) subcutaneous once a day (at bedtime)   insulin lispro 100 units/mL injectable solution: 8 unit(s) by subcutaneous into the soft tissue 3 times a day (with meals)   Janumet 50 mg-500 mg oral tablet: 1 tab(s) orally 2 times a day  Jardiance 10 mg oral tablet: 1 tab(s) orally once a day (in the morning)  magnesium oxide 400 mg oral tablet: 1 tab(s) orally 3 times a day (after meals)   NIFEdipine 30 mg oral tablet, extended release: 1 tab(s) orally once a day  Rolling walker: once a day    Aspirin Enteric Coated 81 mg oral delayed release tablet: 1 tab(s) orally once a day   calcium carbonate 1250 mg (500 mg elemental calcium) oral tablet: 1 tab(s) orally 2 times a day  cholecalciferol oral tablet: 28086 unit(s) orally once a week for 12 weeks  insulin glargine 100 units/mL subcutaneous solution: 12 unit(s) subcutaneous once a day (at bedtime)   insulin lispro 100 units/mL injectable solution: 8 unit(s) by subcutaneous into the soft tissue 3 times a day (with meals)   Janumet 50 mg-500 mg oral tablet: 1 tab(s) orally 2 times a day  Jardiance 10 mg oral tablet: 1 tab(s) orally once a day (in the morning)  magnesium oxide 400 mg oral tablet: 1 tab(s) orally 3 times a day (after meals)   NIFEdipine 30 mg oral tablet, extended release: 1 tab(s) orally once a day  NovoLOG FlexPen 100 units/mL injectable solution: 8 unit(s) subcutaneous 3 times a day (before meals)   Rolling walker: once a day    Aspirin Enteric Coated 81 mg oral delayed release tablet: 1 tab(s) orally once a day   calcium carbonate 1250 mg (500 mg elemental calcium) oral tablet: 1 tab(s) orally 2 times a day  Cane : 1 application transdermally once a day   cholecalciferol oral tablet: 77173 unit(s) orally once a week for 12 weeks  insulin glargine 100 units/mL subcutaneous solution: 12 unit(s) subcutaneous once a day (at bedtime)   insulin lispro 100 units/mL injectable solution: 8 unit(s) by subcutaneous into the soft tissue 3 times a day (with meals)   Janumet 50 mg-500 mg oral tablet: 1 tab(s) orally 2 times a day  Jardiance 10 mg oral tablet: 1 tab(s) orally once a day (in the morning)  magnesium oxide 400 mg oral tablet: 1 tab(s) orally 3 times a day (after meals)   NIFEdipine 30 mg oral tablet, extended release: 1 tab(s) orally once a day  NovoLOG FlexPen 100 units/mL injectable solution: 8 unit(s) subcutaneous 3 times a day (before meals)   pen needles: 1 application subcutaneous 3 times a day (before meals)   Rolling walker: once a day    Aspirin Enteric Coated 81 mg oral delayed release tablet: 1 tab(s) orally once a day   calcium carbonate 1250 mg (500 mg elemental calcium) oral tablet: 1 tab(s) orally 2 times a day  Cane : 1 application transdermally once a day   cane:   cholecalciferol oral tablet: 78036 unit(s) orally once a week for 12 weeks  insulin glargine 100 units/mL subcutaneous solution: 12 unit(s) subcutaneous once a day (at bedtime)   insulin lispro 100 units/mL injectable solution: 8 unit(s) by subcutaneous into the soft tissue 3 times a day (with meals)   Janumet 50 mg-500 mg oral tablet: 1 tab(s) orally 2 times a day  Jardiance 10 mg oral tablet: 1 tab(s) orally once a day (in the morning)  magnesium oxide 400 mg oral tablet: 1 tab(s) orally 3 times a day (after meals)   NIFEdipine 30 mg oral tablet, extended release: 1 tab(s) orally once a day  NovoLOG FlexPen 100 units/mL injectable solution: 8 unit(s) subcutaneous 3 times a day (before meals)   pen needles: 1 application subcutaneous 3 times a day (before meals)   Rolling walker: once a day

## 2023-02-17 NOTE — PROGRESS NOTE ADULT - PROBLEM SELECTOR PLAN 7
Ct A/P showing  a small 3mm pancreatic cyst and tree in bud opacities in right middle lobe.  c/w Rocephin  F/U CTC result
Ct A/P showing  a small 3mm pancreatic cyst and tree in bud opacities in right middle lobe.  c/w Rocephin
- Hypomagnesemia  - Hypophosphatemia  - Hypocalcemia  - Hypocalcemia Likely contributed by hypomagnesemia (1.3)  - Start MgSO4 2g IV x2  - Start VitD3 2000U qd   -  Low Ionized Ca (5.0) & Vit D (16.5)  - Intact PTH elevated 194  - Repleting with supplement  - Monitor daily phos & Mag  - Endo Dr. Jalloh following
- Hypomagnesemia  - Hypophosphatemia  - Hypocalcemia  - F/U with Ionized Ca & Vit D  - Repleting with supplement  - Monitor daily phos & Mag
Ct A/P showing  a small 3mm pancreatic cyst and tree in bud opacities in right middle lobe.  c/w Rocephin   CT Chest result showing ill-defined nonspecific left upper lobe patchy ground-glass opacity with small prabhakar pleural effusion.

## 2023-02-17 NOTE — PROGRESS NOTE ADULT - SUBJECTIVE AND OBJECTIVE BOX
`Patient is a 67y old  Male who presents with a chief complaint of hyperosmolar hyperglycemic state (17 Feb 2023 07:34)    PATIENT IS SEEN AND EXAMINED IN MEDICAL FLOOR.  LUISAT [    ]    SHERMAN [   ]      GT [   ]    ALLERGIES:  No Known Allergies      Daily     Daily     VITALS:    Vital Signs Last 24 Hrs  T(C): 36.7 (17 Feb 2023 05:17), Max: 37 (16 Feb 2023 13:43)  T(F): 98.1 (17 Feb 2023 05:17), Max: 98.6 (16 Feb 2023 13:43)  HR: 73 (17 Feb 2023 05:17) (73 - 86)  BP: 120/69 (17 Feb 2023 05:17) (118/62 - 125/74)  BP(mean): 80 (16 Feb 2023 10:58) (80 - 80)  RR: 17 (17 Feb 2023 05:17) (17 - 18)  SpO2: 96% (17 Feb 2023 05:17) (95% - 97%)    Parameters below as of 17 Feb 2023 05:17  Patient On (Oxygen Delivery Method): room air        LABS:    CBC Full  -  ( 17 Feb 2023 06:10 )  WBC Count : 7.24 K/uL  RBC Count : 3.80 M/uL  Hemoglobin : 11.6 g/dL  Hematocrit : 35.5 %  Platelet Count - Automated : 236 K/uL  Mean Cell Volume : 93.4 fl  Mean Cell Hemoglobin : 30.5 pg  Mean Cell Hemoglobin Concentration : 32.7 gm/dL  Auto Neutrophil # : x  Auto Lymphocyte # : x  Auto Monocyte # : x  Auto Eosinophil # : x  Auto Basophil # : x  Auto Neutrophil % : x  Auto Lymphocyte % : x  Auto Monocyte % : x  Auto Eosinophil % : x  Auto Basophil % : x    PT/INR - ( 17 Feb 2023 06:10 )   PT: 12.4 sec;   INR: 1.04 ratio         PTT - ( 17 Feb 2023 06:10 )  PTT:30.5 sec  02-17    140  |  107  |  19<H>  ----------------------------<  169<H>  3.9   |  27  |  0.67    Ca    8.0<L>      17 Feb 2023 06:10  Phos  2.3     02-17  Mg     1.6     02-17    TPro  5.0<L>  /  Alb  1.8<L>  /  TBili  4.1<H>  /  DBili  x   /  AST  39  /  ALT  77<H>  /  AlkPhos  156<H>  02-17    CAPILLARY BLOOD GLUCOSE      POCT Blood Glucose.: 242 mg/dL (17 Feb 2023 07:36)  POCT Blood Glucose.: 209 mg/dL (16 Feb 2023 21:16)  POCT Blood Glucose.: 217 mg/dL (16 Feb 2023 17:00)  POCT Blood Glucose.: 262 mg/dL (16 Feb 2023 11:36)        LIVER FUNCTIONS - ( 17 Feb 2023 06:10 )  Alb: 1.8 g/dL / Pro: 5.0 g/dL / ALK PHOS: 156 U/L / ALT: 77 U/L DA / AST: 39 U/L / GGT: x           Creatinine Trend: 0.67<--, 0.88<--, 0.70<--, 0.73<--, 0.99<--, 1.36<--  I&O's Summary    16 Feb 2023 07:01  -  17 Feb 2023 07:00  --------------------------------------------------------  IN: 0 mL / OUT: 1170 mL / NET: -1170 mL            Clean Catch Clean Catch (Midstream)  02-10 @ 02:35   <10,000 CFU/mL Normal Urogenital Nerissa  --  --          MEDICATIONS:    MEDICATIONS  (STANDING):  calcium carbonate   1250 mG (OsCal) 1 Tablet(s) Oral two times a day  cholecalciferol 1000 Unit(s) Oral daily  ergocalciferol 07673 Unit(s) Oral <User Schedule>  insulin glargine Injectable (LANTUS) 10 Unit(s) SubCutaneous at bedtime  insulin lispro (ADMELOG) corrective regimen sliding scale   SubCutaneous three times a day before meals  insulin lispro Injectable (ADMELOG) 6 Unit(s) SubCutaneous three times a day before meals  magnesium oxide 400 milliGRAM(s) Oral two times a day with meals  NIFEdipine XL 30 milliGRAM(s) Oral daily  potassium phosphate / sodium phosphate Powder (PHOS-NaK) 1 Packet(s) Oral three times a day      MEDICATIONS  (PRN):  polyethylene glycol 3350 17 Gram(s) Oral daily PRN Constipation      REVIEW OF SYSTEMS:                           ALL ROS DONE [ X   ]    CONSTITUTIONAL:  LETHARGIC [   ], FEVER [   ], UNRESPONSIVE [   ]  CVS:  CP  [   ], SOB, [   ], PALPITATIONS [   ], DIZZYNESS [   ]  RS: COUGH [   ], SPUTUM [   ]  GI: ABDOMINAL PAIN [   ], NAUSEA [   ], VOMITINGS [   ], DIARRHEA [   ], CONSTIPATION [   ]  :  DYSURIA [   ], NOCTURIA [   ], INCREASED FREQUENCY [   ], DRIBLING [   ],  SKELETAL: PAINFUL JOINTS [   ], SWOLLEN JOINTS [   ], NECK ACHE [   ], LOW BACK ACHE [   ],  SKIN : ULCERS [   ], RASH [   ], ITCHING [   ]  CNS: HEAD ACHE [   ], DOUBLE VISION [   ], BLURRED VISION [   ], AMS / CONFUSION [   ], SEIZURES [   ], WEAKNESS [   ],TINGLING / NUMBNESS [   ]      PHYSICAL EXAMINATION:  GENERAL APPEARANCE: NO DISTRESS  HEENT:  NO PALLOR, NO  JVD,  NO   NODES, NECK SUPPLE  CVS: S1 +, S2 +,   RS: AEEB,  OCCASIONAL  RALES +,   NO RONCHI  ABD: SOFT, NO, BS +   ;  TTP IN EPIGASTRIC/LUQ REGION +  EXT: NO PE  SKIN: WARM,  JAUNDICE  SKELETAL:  ROM ACCEPTABLE  CNS:  AAO X 2-3    RADIOLOGY :    RADIOLOGY AND READINGS REVIEWED    ASSESSMENT :       PLAN:  HPI:  67 year old male PMH CAD s/p CABG, DM, HLD coming in with jaundice and generalized body aches. Patient is lethargic and unable to provide much history. As per ED provider, " his symptoms started about 5 days ago initially with flu-like symptoms and vomiting. went to PMD and was given a nausea medication and felt improved but now feeling worse again. daughter states jaundice is also getting worse. has lost about 10-15lbs in the past week."     (10 Feb 2023 02:52)    # CASE D/W PATIENT'S DAUGHTER FIN[employer] [place] [ALSO @ [phone]] AT BEDSIDE ALL QUESTIONS ANSWERED [2/14]  # CASE D/W PATIENT VIA [employer] [facility] , [place] [id number]    # HHS - RESOLVED  # DM  - ON LANTUS, TID INSULIN, SSI + FS  - HBA1C - 9.1  - ENDOCRINOLOGY CONSULT PLACED    # SEPSIS S/T SUSPECTED PNEUMONIA  - PLACED ON ROCEPHIN, BCX - NGTD    # KYLE - RESOLVED  # HYPERNATREMIA  - S/P IVF  - STRICT IS AN OS  - MONITOR CR  - AVOID NEPHROTOXIC AGENTS  - NEPHROLOGY CONSULT    # HYPOKALEMIA, HYPOMAGNESEMIA  - REPLETING WITH SUPPLEMENT    # JAUNDICE, HYPERBILIRUBINEMIA   - NOTED CT A/P  - NOTED MRCP  - F/U HEPATITIS PANEL, AUTOIMMUNE WORKUP, TUMOR MARKERS, IRON PANEL  - GI CONSULT IN PROGRESS  - HEPATOLOGY CONSULT IN PROGRESS    - D/W HEPATOLOGY, REGARDING BIOPSY, WILL CONTINUE TO TREND LFTS, PRIOR TO PURSUING BIOPSY    - PATIENT REPORTS ONLY SUPPLEMENT HE USES IS GINSENG    # CAD s/p CABG  # HTN  - ON PLAVIX  - ON NIFEDIPINE    # HYPOCALCEMIA  # VIT D. DEFICIENCY  - F/U IONIZED CALCIUM  - NOTED PTH, 25OH VIT D  - PLACED ON VIT D AND CALCIUM    # HYPOMAGNESEMIA  - REPLETING WITH SUPPLEMENT    # ? A.FIB  - ON XARELTO, HOLD  - DAUGHTER UNSURE, WILL OBTAIN OUTPATIENT RECORDS IF POSSIBLE    # HLD - ON STATIN, HOLD    # GI PPX     Patient is a 67y old  Male who presents with a chief complaint of hyperosmolar hyperglycemic state (17 Feb 2023 07:34)    PATIENT IS SEEN AND EXAMINED IN MEDICAL FLOOR. PATIENT DENIES ABDOMINAL PAIN/N/V/C/D.    ALLERGIES:  No Known Allergies    VITALS:    Vital Signs Last 24 Hrs  T(C): 36.7 (17 Feb 2023 05:17), Max: 37 (16 Feb 2023 13:43)  T(F): 98.1 (17 Feb 2023 05:17), Max: 98.6 (16 Feb 2023 13:43)  HR: 73 (17 Feb 2023 05:17) (73 - 86)  BP: 120/69 (17 Feb 2023 05:17) (118/62 - 125/74)  BP(mean): 80 (16 Feb 2023 10:58) (80 - 80)  RR: 17 (17 Feb 2023 05:17) (17 - 18)  SpO2: 96% (17 Feb 2023 05:17) (95% - 97%)    Parameters below as of 17 Feb 2023 05:17  Patient On (Oxygen Delivery Method): room air        LABS:    CBC Full  -  ( 17 Feb 2023 06:10 )  WBC Count : 7.24 K/uL  RBC Count : 3.80 M/uL  Hemoglobin : 11.6 g/dL  Hematocrit : 35.5 %  Platelet Count - Automated : 236 K/uL  Mean Cell Volume : 93.4 fl  Mean Cell Hemoglobin : 30.5 pg  Mean Cell Hemoglobin Concentration : 32.7 gm/dL  Auto Neutrophil # : x  Auto Lymphocyte # : x  Auto Monocyte # : x  Auto Eosinophil # : x  Auto Basophil # : x  Auto Neutrophil % : x  Auto Lymphocyte % : x  Auto Monocyte % : x  Auto Eosinophil % : x  Auto Basophil % : x    PT/INR - ( 17 Feb 2023 06:10 )   PT: 12.4 sec;   INR: 1.04 ratio         PTT - ( 17 Feb 2023 06:10 )  PTT:30.5 sec  02-17    140  |  107  |  19<H>  ----------------------------<  169<H>  3.9   |  27  |  0.67    Ca    8.0<L>      17 Feb 2023 06:10  Phos  2.3     02-17  Mg     1.6     02-17    TPro  5.0<L>  /  Alb  1.8<L>  /  TBili  4.1<H>  /  DBili  x   /  AST  39  /  ALT  77<H>  /  AlkPhos  156<H>  02-17    CAPILLARY BLOOD GLUCOSE      POCT Blood Glucose.: 242 mg/dL (17 Feb 2023 07:36)  POCT Blood Glucose.: 209 mg/dL (16 Feb 2023 21:16)  POCT Blood Glucose.: 217 mg/dL (16 Feb 2023 17:00)  POCT Blood Glucose.: 262 mg/dL (16 Feb 2023 11:36)        LIVER FUNCTIONS - ( 17 Feb 2023 06:10 )  Alb: 1.8 g/dL / Pro: 5.0 g/dL / ALK PHOS: 156 U/L / ALT: 77 U/L DA / AST: 39 U/L / GGT: x           Creatinine Trend: 0.67<--, 0.88<--, 0.70<--, 0.73<--, 0.99<--, 1.36<--  I&O's Summary    16 Feb 2023 07:01  -  17 Feb 2023 07:00  --------------------------------------------------------  IN: 0 mL / OUT: 1170 mL / NET: -1170 mL            Clean Catch Clean Catch (Midstream)  02-10 @ 02:35   <10,000 CFU/mL Normal Urogenital Nerissa  --  --          MEDICATIONS:    MEDICATIONS  (STANDING):  calcium carbonate   1250 mG (OsCal) 1 Tablet(s) Oral two times a day  cholecalciferol 1000 Unit(s) Oral daily  ergocalciferol 43872 Unit(s) Oral <User Schedule>  insulin glargine Injectable (LANTUS) 10 Unit(s) SubCutaneous at bedtime  insulin lispro (ADMELOG) corrective regimen sliding scale   SubCutaneous three times a day before meals  insulin lispro Injectable (ADMELOG) 6 Unit(s) SubCutaneous three times a day before meals  magnesium oxide 400 milliGRAM(s) Oral two times a day with meals  NIFEdipine XL 30 milliGRAM(s) Oral daily  potassium phosphate / sodium phosphate Powder (PHOS-NaK) 1 Packet(s) Oral three times a day      MEDICATIONS  (PRN):  polyethylene glycol 3350 17 Gram(s) Oral daily PRN Constipation      REVIEW OF SYSTEMS:                           ALL ROS DONE [ X   ]    CONSTITUTIONAL:  LETHARGIC [   ], FEVER [   ], UNRESPONSIVE [   ]  CVS:  CP  [   ], SOB, [   ], PALPITATIONS [   ], DIZZYNESS [   ]  RS: COUGH [   ], SPUTUM [   ]  GI: ABDOMINAL PAIN [   ], NAUSEA [   ], VOMITINGS [   ], DIARRHEA [   ], CONSTIPATION [   ]  :  DYSURIA [   ], NOCTURIA [   ], INCREASED FREQUENCY [   ], DRIBLING [   ],  SKELETAL: PAINFUL JOINTS [   ], SWOLLEN JOINTS [   ], NECK ACHE [   ], LOW BACK ACHE [   ],  SKIN : ULCERS [   ], RASH [   ], ITCHING [   ]  CNS: HEAD ACHE [   ], DOUBLE VISION [   ], BLURRED VISION [   ], AMS / CONFUSION [   ], SEIZURES [   ], WEAKNESS [   ],TINGLING / NUMBNESS [   ]      PHYSICAL EXAMINATION:  GENERAL APPEARANCE: NO DISTRESS  HEENT:  NO PALLOR, NO  JVD,  NO   NODES, NECK SUPPLE  CVS: S1 +, S2 +,   RS: AEEB,  OCCASIONAL  RALES +,   NO RONCHI  ABD: SOFT, NO, BS +   ;  TTP IN EPIGASTRIC/LUQ REGION +  EXT: NO PE  SKIN: WARM,  JAUNDICE  SKELETAL:  ROM ACCEPTABLE  CNS:  AAO X 2-3    RADIOLOGY :    RADIOLOGY AND READINGS REVIEWED    ASSESSMENT :       PLAN:  HPI:  67 year old male PMH CAD s/p CABG, DM, HLD coming in with jaundice and generalized body aches. Patient is lethargic and unable to provide much history. As per ED provider, " his symptoms started about 5 days ago initially with flu-like symptoms and vomiting. went to PMD and was given a nausea medication and felt improved but now feeling worse again. daughter states jaundice is also getting worse. has lost about 10-15lbs in the past week."     (10 Feb 2023 02:52)    # CASE D/W PATIENT'S DAUGHTER [facility] [place] [ALSO @ [phone]] VIA PATIENT'S PHONE AT BEDSIDE ALL QUESTIONS ANSWERED [2/17]        # SEPSIS S/T SUSPECTED PNEUMONIA  - S/P ROCEPHIN  - NOTED CT CHEST  - ID CONSULT      # JAUNDICE, HYPERBILIRUBINEMIA   - NOTED CT A/P  - NOTED MRCP  - F/U HEPATITIS PANEL, AUTOIMMUNE WORKUP, TUMOR MARKERS, IRON PANEL  - GI CONSULT IN PROGRESS  - HEPATOLOGY CONSULT IN PROGRESS    - D/W HEPATOLOGY, REGARDING BIOPSY, WILL CONTINUE TO TREND LFTS, RECOMMENDS DEFERRING BIOPSY GIVEN IMPROVEMENT. RECOMMENDS FURTHER OUTPATIENT F/U     - PATIENT REPORTS ONLY SUPPLEMENT HE USES IS GINSENG    # HHS - RESOLVED  # DM  - ON LANTUS, TID INSULIN, SSI + FS  - HBA1C - 9.1  - ENDOCRINOLOGY CONSULT PLACED    # KYLE - RESOLVED  # HYPERNATREMIA  - S/P IVF  - STRICT IS AN OS  - MONITOR CR  - AVOID NEPHROTOXIC AGENTS  - NEPHROLOGY CONSULT    # HYPOKALEMIA, HYPOMAGNESEMIA  - REPLETING WITH SUPPLEMENT    # CAD s/p CABG  # HTN  - ON PLAVIX AND XARELTO 2.5 MG ; D/W HEPATOLOGY AND W/ CARDIOLOGY. UNABLE TO GET IN TOUCH WITH PCP DESPITE MULTIPLE CALLS. PATIENT DENIES HX OF ARRHYTHMIA OR DVT. PER CARDIOLOGY, THIS DOSE OF XARELTO IS USUALLY FOR CAD/PAD. GIVEN TRANSAMINITIS, RECOMMENDED FOR SWITCHING XARELTO AND PLAVIX AS THESE ARE CHOLESTATIC. CARDIOLOGY RECOMMENDS ASPIRIN.  - ON NIFEDIPINE    # HYPOCALCEMIA  # VIT D. DEFICIENCY  - F/U IONIZED CALCIUM  - NOTED PTH, 25OH VIT D  - PLACED ON VIT D AND CALCIUM    # HYPOMAGNESEMIA  - REPLETING WITH SUPPLEMENT    # HLD - ON STATIN, HOLD    # GI PPX     Patient is a 67y old  Male who presents with a chief complaint of hyperosmolar hyperglycemic state (17 Feb 2023 07:34)    PATIENT IS SEEN AND EXAMINED IN MEDICAL FLOOR. PATIENT DENIES ABDOMINAL PAIN/N/V/C/D.    ALLERGIES:  No Known Allergies    VITALS:    Vital Signs Last 24 Hrs  T(C): 36.7 (17 Feb 2023 05:17), Max: 37 (16 Feb 2023 13:43)  T(F): 98.1 (17 Feb 2023 05:17), Max: 98.6 (16 Feb 2023 13:43)  HR: 73 (17 Feb 2023 05:17) (73 - 86)  BP: 120/69 (17 Feb 2023 05:17) (118/62 - 125/74)  BP(mean): 80 (16 Feb 2023 10:58) (80 - 80)  RR: 17 (17 Feb 2023 05:17) (17 - 18)  SpO2: 96% (17 Feb 2023 05:17) (95% - 97%)    Parameters below as of 17 Feb 2023 05:17  Patient On (Oxygen Delivery Method): room air        LABS:    CBC Full  -  ( 17 Feb 2023 06:10 )  WBC Count : 7.24 K/uL  RBC Count : 3.80 M/uL  Hemoglobin : 11.6 g/dL  Hematocrit : 35.5 %  Platelet Count - Automated : 236 K/uL  Mean Cell Volume : 93.4 fl  Mean Cell Hemoglobin : 30.5 pg  Mean Cell Hemoglobin Concentration : 32.7 gm/dL  Auto Neutrophil # : x  Auto Lymphocyte # : x  Auto Monocyte # : x  Auto Eosinophil # : x  Auto Basophil # : x  Auto Neutrophil % : x  Auto Lymphocyte % : x  Auto Monocyte % : x  Auto Eosinophil % : x  Auto Basophil % : x    PT/INR - ( 17 Feb 2023 06:10 )   PT: 12.4 sec;   INR: 1.04 ratio         PTT - ( 17 Feb 2023 06:10 )  PTT:30.5 sec  02-17    140  |  107  |  19<H>  ----------------------------<  169<H>  3.9   |  27  |  0.67    Ca    8.0<L>      17 Feb 2023 06:10  Phos  2.3     02-17  Mg     1.6     02-17    TPro  5.0<L>  /  Alb  1.8<L>  /  TBili  4.1<H>  /  DBili  x   /  AST  39  /  ALT  77<H>  /  AlkPhos  156<H>  02-17    CAPILLARY BLOOD GLUCOSE      POCT Blood Glucose.: 242 mg/dL (17 Feb 2023 07:36)  POCT Blood Glucose.: 209 mg/dL (16 Feb 2023 21:16)  POCT Blood Glucose.: 217 mg/dL (16 Feb 2023 17:00)  POCT Blood Glucose.: 262 mg/dL (16 Feb 2023 11:36)        LIVER FUNCTIONS - ( 17 Feb 2023 06:10 )  Alb: 1.8 g/dL / Pro: 5.0 g/dL / ALK PHOS: 156 U/L / ALT: 77 U/L DA / AST: 39 U/L / GGT: x           Creatinine Trend: 0.67<--, 0.88<--, 0.70<--, 0.73<--, 0.99<--, 1.36<--  I&O's Summary    16 Feb 2023 07:01  -  17 Feb 2023 07:00  --------------------------------------------------------  IN: 0 mL / OUT: 1170 mL / NET: -1170 mL            Clean Catch Clean Catch (Midstream)  02-10 @ 02:35   <10,000 CFU/mL Normal Urogenital Nerissa  --  --          MEDICATIONS:    MEDICATIONS  (STANDING):  calcium carbonate   1250 mG (OsCal) 1 Tablet(s) Oral two times a day  cholecalciferol 1000 Unit(s) Oral daily  ergocalciferol 43239 Unit(s) Oral <User Schedule>  insulin glargine Injectable (LANTUS) 10 Unit(s) SubCutaneous at bedtime  insulin lispro (ADMELOG) corrective regimen sliding scale   SubCutaneous three times a day before meals  insulin lispro Injectable (ADMELOG) 6 Unit(s) SubCutaneous three times a day before meals  magnesium oxide 400 milliGRAM(s) Oral two times a day with meals  NIFEdipine XL 30 milliGRAM(s) Oral daily  potassium phosphate / sodium phosphate Powder (PHOS-NaK) 1 Packet(s) Oral three times a day      MEDICATIONS  (PRN):  polyethylene glycol 3350 17 Gram(s) Oral daily PRN Constipation      REVIEW OF SYSTEMS:                           ALL ROS DONE [ X   ]    CONSTITUTIONAL:  LETHARGIC [   ], FEVER [   ], UNRESPONSIVE [   ]  CVS:  CP  [   ], SOB, [   ], PALPITATIONS [   ], DIZZYNESS [   ]  RS: COUGH [   ], SPUTUM [   ]  GI: ABDOMINAL PAIN [   ], NAUSEA [   ], VOMITINGS [   ], DIARRHEA [   ], CONSTIPATION [   ]  :  DYSURIA [   ], NOCTURIA [   ], INCREASED FREQUENCY [   ], DRIBLING [   ],  SKELETAL: PAINFUL JOINTS [   ], SWOLLEN JOINTS [   ], NECK ACHE [   ], LOW BACK ACHE [   ],  SKIN : ULCERS [   ], RASH [   ], ITCHING [   ]  CNS: HEAD ACHE [   ], DOUBLE VISION [   ], BLURRED VISION [   ], AMS / CONFUSION [   ], SEIZURES [   ], WEAKNESS [   ],TINGLING / NUMBNESS [   ]      PHYSICAL EXAMINATION:  GENERAL APPEARANCE: NO DISTRESS  HEENT:  NO PALLOR, NO  JVD,  NO   NODES, NECK SUPPLE  CVS: S1 +, S2 +,   RS: AEEB,  OCCASIONAL  RALES +,   NO RONCHI  ABD: SOFT, NO, BS +   ;  TTP IN EPIGASTRIC/LUQ REGION +  EXT: NO PE  SKIN: WARM,  JAUNDICE  SKELETAL:  ROM ACCEPTABLE  CNS:  AAO X 2-3    RADIOLOGY :    RADIOLOGY AND READINGS REVIEWED    ASSESSMENT :       PLAN:  HPI:  67 year old male PMH CAD s/p CABG, DM, HLD coming in with jaundice and generalized body aches. Patient is lethargic and unable to provide much history. As per ED provider, " his symptoms started about 5 days ago initially with flu-like symptoms and vomiting. went to PMD and was given a nausea medication and felt improved but now feeling worse again. daughter states jaundice is also getting worse. has lost about 10-15lbs in the past week."     (10 Feb 2023 02:52)    # CASE D/W PATIENT'S DAUGHTER [employer] [place] [ALSO @ [phone]] VIA PATIENT'S PHONE AT BEDSIDE ALL QUESTIONS ANSWERED [2/17]    # D/C TOMORROW IF MEDICALLY STABLE AND CLEARED BY HEPATOLOGY, ID TEAMS ; CM COORDINATING WITH FAMILY    # SEPSIS S/T SUSPECTED PNEUMONIA  - ON ROCEPHIN  - NOTED CT CHEST  - ID CONSULT    # JAUNDICE, HYPERBILIRUBINEMIA - ? S/T SEPSIS  - NOTED CT A/P  - NOTED MRCP - NEGATIVE FOR OBSTRUCTION  - F/U HEPATITIS PANEL, AUTOIMMUNE WORKUP, TUMOR MARKERS, IRON PANEL  - PATIENT REPORTS ONLY SUPPLEMENT HE USES IS GINSENG  - AVOID HEPATOTOXIC AGENTS  - GI CONSULT IN PROGRESS  - HEPATOLOGY CONSULT IN PROGRESS    - D/W HEPATOLOGY, REGARDING BIOPSY, WILL CONTINUE TO TREND LFTS, RECOMMENDS DEFERRING BIOPSY GIVEN IMPROVEMENT. RECOMMENDS FURTHER OUTPATIENT F/U     # HHS - RESOLVED  # DM  - ON LANTUS, TID INSULIN, SSI + FS  - HBA1C - 9.1  - ENDOCRINOLOGY CONSULT PLACED    # KYLE - RESOLVED  # HYPERNATREMIA  - S/P IVF  - STRICT IS AN OS  - MONITOR CR  - AVOID NEPHROTOXIC AGENTS  - NEPHROLOGY CONSULT    # HYPOKALEMIA, HYPOMAGNESEMIA  - REPLETING WITH SUPPLEMENT    # CAD s/p CABG  # HTN  - ON PLAVIX AND XARELTO 2.5 MG ; D/W HEPATOLOGY AND W/ CARDIOLOGY. UNABLE TO GET IN TOUCH WITH PCP DESPITE MULTIPLE CALLS. PATIENT DENIES HX OF ARRHYTHMIA OR DVT. PER CARDIOLOGY, THIS DOSE OF XARELTO IS USUALLY FOR CAD/PAD. GIVEN TRANSAMINITIS, RECOMMENDED FOR SWITCHING XARELTO AND PLAVIX AS THESE ARE CHOLESTATIC. CARDIOLOGY RECOMMENDS ASPIRIN.  - ON NIFEDIPINE    # HYPOCALCEMIA - IMPROVED  # VIT D. DEFICIENCY  - F/U IONIZED CALCIUM  - NOTED PTH, 25OH VIT D  - PLACED ON VIT D AND CALCIUM    # HYPOMAGNESEMIA  - REPLETING WITH SUPPLEMENT    # HLD - ON STATIN, HOLD    # GI PPX

## 2023-02-17 NOTE — PROGRESS NOTE ADULT - SUBJECTIVE AND OBJECTIVE BOX
Chief Complaint:  Patient is a 67y old  Male who presents with a chief complaint of hyperosmolar hyperglycemic state (2023 13:37)    Patient daughter interpreted via phone as per patient request.     Reason for consult: Jaundice    Interval Events: Patient was seen and examined at bedside. No new complaints. Bilirubin and liver enzymes continued to downtrend.    [facility] [place] [address], Tel . His medication list per the [facility]: Allopurinol 100 mg daily, Xarelto 2.5 mg daily, Clopidogrel 75 mg daily, JArdiance 10 mg daily, Vascepa 2g bid, Janumet 500 mg bid, Tums 500 mg daily, Dexilant 30 mg daily, Vit D 1000 U daily, Nifedipine 30 mg daily. He had Nexletol 180 mg but its preauthorization , he did not  in February. He used to be on Livalo from a different pharmacy.   He picked up 23 Levofloxacin  500, Mucinex and Tylenol 500 mg q 6 hrs.     Hospital Medications:  calcium carbonate   1250 mG (OsCal) 1 Tablet(s) Oral two times a day  cholecalciferol 1000 Unit(s) Oral daily  ergocalciferol 87785 Unit(s) Oral <User Schedule>  insulin glargine Injectable (LANTUS) 10 Unit(s) SubCutaneous at bedtime  insulin lispro (ADMELOG) corrective regimen sliding scale   SubCutaneous three times a day before meals  insulin lispro Injectable (ADMELOG) 6 Unit(s) SubCutaneous three times a day before meals  magnesium oxide 400 milliGRAM(s) Oral two times a day with meals  NIFEdipine XL 30 milliGRAM(s) Oral daily  polyethylene glycol 3350 17 Gram(s) Oral daily PRN  potassium phosphate / sodium phosphate Powder (PHOS-NaK) 1 Packet(s) Oral three times a day      ROS:   General:  Per daughter fever was only subjective fever, and for one day, chills also for one day  Eyes:  Good vision, no reported pain  ENT:  No sore throat, pain, runny nose  CV:  No pain, palpitations  Pulm:  No dyspnea, cough  GI:  No abdominal pain, N/V, reports that has not had BM for few days, but denies any diarrhea prior or any change in urine color.  :  No  dysuria  Muscle:  No pain, weakness  Neuro:  No memory problems  Skin:  No rash    PHYSICAL EXAM:   Vital Signs:  Vital Signs Last 24 Hrs  T(C): 36.6 (2023 14:26), Max: 36.8 (2023 21:23)  T(F): 97.8 (2023 14:26), Max: 98.3 (2023 21:23)  HR: 110 (2023 14:) (73 - 110)  BP: 106/58 (2023 14:26) (106/58 - 124/64)  BP(mean): --  RR: 22 (:) (17 - 22)  SpO2: 95% (:) (95% - 96%)    Parameters below as of :  Patient On (Oxygen Delivery Method): room air      Daily     Daily     GENERAL: no acute distress  NEURO: AAOx3, no asterixis  HEENT: icteric sclera  CHEST: no respiratory distress, no accessory muscle use  CARDIAC: regular rate, rhythm  ABDOMEN: soft, non-tender, non-distended, no rebound or guarding, BS+  EXTREMITIES: warm, well perfused, no edema  SKIN: no rash    LABS: reviewed                        11.6   7.24  )-----------( 236      ( 2023 06:10 )             35.5         140  |  107  |  19<H>  ----------------------------<  169<H>  3.9   |  27  |  0.67    Ca    8.0<L>      2023 06:10  Phos  2.3     -  Mg     1.6         TPro  5.0<L>  /  Alb  1.8<L>  /  TBili  4.1<H>  /  DBili  x   /  AST  39  /  ALT  77<H>  /  AlkPhos  156<H>      LIVER FUNCTIONS - ( 2023 06:10 )  Alb: 1.8 g/dL / Pro: 5.0 g/dL / ALK PHOS: 156 U/L / ALT: 77 U/L DA / AST: 39 U/L / GGT: x             Interval Diagnostic Studies: see sunrise for full report

## 2023-02-17 NOTE — PROGRESS NOTE ADULT - PROBLEM SELECTOR PLAN 2
resolved   endo on board   c/w sliding scale  6 units Admelog pre meals   Lantus 10 units at bedtime   A1c 9.1
resolved   endo on board   c/w sliding scale  6 units Admelog pre meals   Lantus 10 units at bedtime   A1c 9.1
- Elevated bili & direct bili & tranaminitis  - Avoid hepatoxic agents  - F/U hep panel, autoimmune workup, tumor markers, & iron panel  - For MRCP today  - GI Dr. Stroud following
- Elevated bili & direct bili & tranaminitis  - Avoid hepatoxic agents  - F/U hep panel, autoimmune workup, tumor markers, & iron panel  - S/P MRCP today showing GBD WNL, with no gall stones  - GI Dr. Stroud following
resolved   endo on board   c/w sliding scale  6 units Admelog pre meals   Lantus 10 units at bedtime   A1c 9.1

## 2023-02-17 NOTE — CONSULT NOTE ADULT - CONSULT REASON
Pneumonia
CAD, CABG
Electrolyte abnormalities  Elevated creatinine
Hyperbilirubinemia
ICU STEPDOWN
Painless Jaundice
HHS

## 2023-02-17 NOTE — PROGRESS NOTE ADULT - SUBJECTIVE AND OBJECTIVE BOX
C A R D I O L O G Y  **********************************    DATE OF SERVICE: 02-17-23    Patient denies chest pain or shortness of breath.   Review of symptoms otherwise negative.    calcium carbonate   1250 mG (OsCal) 1 Tablet(s) Oral two times a day  cholecalciferol 1000 Unit(s) Oral daily  ergocalciferol 34319 Unit(s) Oral <User Schedule>  insulin glargine Injectable (LANTUS) 10 Unit(s) SubCutaneous at bedtime  insulin lispro (ADMELOG) corrective regimen sliding scale   SubCutaneous three times a day before meals  insulin lispro Injectable (ADMELOG) 6 Unit(s) SubCutaneous three times a day before meals  magnesium oxide 400 milliGRAM(s) Oral two times a day with meals  NIFEdipine XL 30 milliGRAM(s) Oral daily  polyethylene glycol 3350 17 Gram(s) Oral daily PRN  potassium phosphate / sodium phosphate Powder (PHOS-NaK) 1 Packet(s) Oral three times a day                            11.6   7.24  )-----------( 236      ( 17 Feb 2023 06:10 )             35.5       Hemoglobin: 11.6 g/dL (02-17 @ 06:10)  Hemoglobin: 12.0 g/dL (02-16 @ 06:05)  Hemoglobin: 13.1 g/dL (02-15 @ 06:58)  Hemoglobin: 13.4 g/dL (02-14 @ 06:01)  Hemoglobin: 13.2 g/dL (02-13 @ 06:03)      02-17    140  |  107  |  19<H>  ----------------------------<  169<H>  3.9   |  27  |  0.67    Ca    8.0<L>      17 Feb 2023 06:10  Phos  2.3     02-17  Mg     1.6     02-17    TPro  5.0<L>  /  Alb  1.8<L>  /  TBili  4.1<H>  /  DBili  x   /  AST  39  /  ALT  77<H>  /  AlkPhos  156<H>  02-17    Creatinine Trend: 0.67<--, 0.88<--, 0.70<--, 0.73<--, 0.99<--, 1.36<--    COAGS: PT/INR - ( 17 Feb 2023 06:10 )   PT: 12.4 sec;   INR: 1.04 ratio         PTT - ( 17 Feb 2023 06:10 )  PTT:30.5 sec          T(C): 36.6 (02-17-23 @ 14:26), Max: 36.8 (02-16-23 @ 21:23)  HR: 110 (02-17-23 @ 14:26) (73 - 110)  BP: 106/58 (02-17-23 @ 14:26) (106/58 - 124/64)  RR: 22 (02-17-23 @ 14:26) (17 - 22)  SpO2: 95% (02-17-23 @ 14:26) (95% - 96%)  Wt(kg): --    I&O's Summary    16 Feb 2023 07:01  -  17 Feb 2023 07:00  --------------------------------------------------------  IN: 0 mL / OUT: 1170 mL / NET: -1170 mL            HEENT:  (-)icterus (-)pallor  CV: N S1 S2 1/6 DARIEN (+)2 Pulses B/l  Resp:  Clear to ausculatation B/L, normal effort  GI: (+) BS Soft, NT, ND  Lymph:  (-)Edema, (-)obvious lymphadenopathy  Skin: Warm to touch, Normal turgor  Psych: Appropriate mood and affect          ASSESSMENT/PLAN: 	67y  Male   PMH CAD s/p CABG, DM, HLD coming in with jaundice and generalized body aches.    #CAD  - ideally should be on an ASA and statin once plts and LFTs are stable  - echo with no pertinent findings      # ? PAF  - oupt med reconciliation reviewed, d/w medical team he is on Xarelto 2.5 mg PO daily which is th dose for CAD/ PAD (not Afib or DVT)  - CAn hold xarelto if it is felt to be contributing to cholestasis     # HTN  - BP currently acceptable  - Monitor For now    # Jaundice  - GI f/u  - MRCP noted      Arben Bhardwaj MD, [facility]  BEEPER [phone]

## 2023-02-17 NOTE — PROGRESS NOTE ADULT - PROBLEM SELECTOR PLAN 4
Likely prerenal 2/2 dehydration  creatinine normalized with hydration   Avoid nephrotoxic agents  Monitor Scr  Nephro Dr. Wynn following
- S/P CABBG  - C/W Plavix  - Cardio Dr. Bhardwaj following
Likely prerenal 2/2 dehydration  creatinine normalized with hydration   Avoid nephrotoxic agents  Monitor Scr  Nephro Dr. Wynn following
Likely prerenal 2/2 dehydration  creatinine normalized with hydration   Avoid nephrotoxic agents  Monitor Scr  Nephro Dr. Wynn following
- S/P CABBG  - C/W Plavix  - Cardio Dr. Bhardwaj following

## 2023-02-17 NOTE — CONSULT NOTE ADULT - ASSESSMENT
Pneumonia  Abnormal LFTs - likely secondary to his infection      Plan - Give Rocephin 1 gm iv today and tomorrow   Can DC home tomorrow after Rocephin dose on no antibiotics.  Reconsult prn.

## 2023-02-17 NOTE — CONSULT NOTE ADULT - CONSULT REQUESTED DATE/TIME
10-Feb-2023 10:58
12-Feb-2023 15:32
11-Feb-2023
15-Feb-2023
12-Feb-2023 13:49
17-Feb-2023 18:44
13-Feb-2023 08:45

## 2023-02-17 NOTE — PROGRESS NOTE ADULT - ASSESSMENT
67y Male with Hx of HTN, uncontrolled Type 2 DM (Hba1c 9.1), HLD, CAD s/p CABG presented to [facility] ED on 2/10/23 with 5 days Hx malaise, generalized body aches, initially flu like symptoms, nausea, vomiting, 10-15 lb weight loss in a week and jaundice, and was admitted to MICU with HHS with severe hypokalemia, KYLE and jaundice / mostly direct hyperbilirubinemia but without obstruction on MRCP, and sepsis due to pneumonia (?).      Mostly direct hyperbilirubinemia, with mixed pattern mild liver enzyme elevation  - Given normal INR, continued improvement, can monitor LFTs closely, but if no further improvement or worsening, might need liver biopsy in the future.    - No obstruction on MRCP  - The suspected sepsis per notes, and cholestasis of sepsis possibly contributed, especially since improving with antibiotics and post HHS management.   - Antibiotics per primary team - s/p 7 days ceftriaxone. No BCx found.  Had neg CXR on admission, but CT a/p suggested tree in bud opacities. CT chest 2/16/23 showed small b/l pleural effusions, small pericardial effusion, both new since 2/10, and ill defined patchy ground glass opacity in KELLY.  Consider ID/pulm. eval. Atypical pneumonia, Mycoplasma/Chlamydia can be a/w hepatitis.    - There is also concern for potential DILI / cannot rule out. See his confirmed med list as above. Had extensive discussion with patient and his daughter to avoid any herbal supplements / teas, OTC supplements.  Among home meds that were held here, Xarelto can cause cholestatic liver injury, usually within 1-8 weeks, and Clopidogrel can also cause cholestatic liver injury, usually 2-24 weeks.  Please discuss with cardiology alternative and avoid rechallenge as cannot fully rule out their contribution. Continue holding allopurinol and similarly once liver tests normalize and cholesterol medication resumed, avoid rechallenge with the same statin, consider alternative.     - Normal LVEF, RV function on TTE 2/13    - Follow up / obtain further AI workup. So far MARA 1:80, weak pos, SMA neg, AMA neg. Please, obtain LKM, Ig panel, ANCA.   - Consider sending complete Hep B serology (patient is from endemic area - need Hep B core antibody total to exclude prior Hep B exposure), Hep E IgM/PCR, EBV, CMV PCRs, less likely but also obtain VZV, HSV PCRs.  - Ferritin, iron sat high, but less informative in setting of sepsis, would repeat once acute issues resolved, and if remains elevated, consider HFE mutation analysis      Thank you for consult  Will continue to monitor. Hepatology returns Tuesday 2/21/23. Please, consult GI on call if change in status, questions, concerns.   D/w primary team

## 2023-02-17 NOTE — PROGRESS NOTE ADULT - PROBLEM SELECTOR PROBLEM 8
Electrolyte imbalance
Discharge planning issues
Electrolyte imbalance
Discharge planning issues
Electrolyte imbalance

## 2023-02-17 NOTE — DISCHARGE NOTE PROVIDER - PROVIDER TOKENS
FREE:[LAST:[Contreras],FIRST:[MD],PHONE:[(   )    -],FAX:[(   )    -],ADDRESS:[[phone]],FOLLOWUP:[1 week]],PROVIDER:[TOKEN:[[id number]],FOLLOWUP:[1 week]] PROVIDER:[TOKEN:[[id number]],FOLLOWUP:[1 week]],PROVIDER:[TOKEN:[[id number]],FOLLOWUP:[1 week]],PROVIDER:[TOKEN:[[id number]],FOLLOWUP:[2 weeks]],FREE:[LAST:[Contreras],FIRST:[MD],PHONE:[(   )    -],FAX:[(   )    -],ADDRESS:[[phone]],FOLLOWUP:[1 week]],PROVIDER:[TOKEN:[[id number]],FOLLOWUP:[2 weeks]]

## 2023-02-17 NOTE — PROGRESS NOTE ADULT - SUBJECTIVE AND OBJECTIVE BOX
Interval Events: No acute events over night. Patient seen and examined at bedside. Reports continued fatigue and complains of being cold today. Reports that he is eating well.        Allergies    No Known Allergies    Intolerances      Endocrine/Metabolic Medications:  insulin glargine Injectable (LANTUS) 10 Unit(s) SubCutaneous at bedtime  insulin lispro (ADMELOG) corrective regimen sliding scale   SubCutaneous three times a day before meals  insulin lispro Injectable (ADMELOG) 6 Unit(s) SubCutaneous three times a day before meals      Vital Signs Last 24 Hrs  T(C): 36.7 (17 Feb 2023 05:17), Max: 37 (16 Feb 2023 13:43)  T(F): 98.1 (17 Feb 2023 05:17), Max: 98.6 (16 Feb 2023 13:43)  HR: 73 (17 Feb 2023 05:17) (73 - 82)  BP: 120/69 (17 Feb 2023 05:17) (120/69 - 125/74)  BP(mean): --  RR: 17 (17 Feb 2023 05:17) (17 - 18)  SpO2: 96% (17 Feb 2023 05:17) (95% - 96%)    Parameters below as of 17 Feb 2023 05:17  Patient On (Oxygen Delivery Method): room air          PHYSICAL EXAM  All physical exam findings normal, except those marked:  General:	Alert, active, cooperative, NAD, well hydrated  .		[] Abnormal:  Neck		Normal: supple, no cervical adenopathy, no palpable thyroid  .		[] Abnormal:  Cardiovascular	Normal: regular rate, normal S1, S2, no murmurs  .		[] Abnormal:  Respiratory	Normal: no chest wall deformity, normal respiratory pattern, CTA B/L  .		[] Abnormal:  Abdominal	Normal: soft, ND, NT, bowel sounds present, no masses, no organomegaly  .		[] Abnormal:  		Normal normal genitalia, testes descended, circumcised/uncircumcised  .		Yina stage:			Breast yina:  .		Menstrual history:  .		[] Abnormal:  Extremities	Normal: FROM x4  .		[] Abnormal:  Skin		Normal: intact and not indurated, no rash, no acanthosis nigricans  .		[x] Abnormal: jaundice   Neurologic	Normal: grossly intact  .		[] Abnormal:    LABS                        11.6   7.24  )-----------( 236      ( 17 Feb 2023 06:10 )             35.5                               140    |  107    |  19                  Calcium: 8.0   / iCa: x      (02-17 @ 06:10)    ----------------------------<  169       Magnesium: 1.6                              3.9     |  27     |  0.67             Phosphorous: 2.3      TPro  5.0    /  Alb  1.8    /  TBili  4.1    /  DBili  x      /  AST  39     /  ALT  77     /  AlkPhos  156    17 Feb 2023 06:10    CAPILLARY BLOOD GLUCOSE      POCT Blood Glucose.: 196 mg/dL (17 Feb 2023 11:47)  POCT Blood Glucose.: 242 mg/dL (17 Feb 2023 07:36)  POCT Blood Glucose.: 209 mg/dL (16 Feb 2023 21:16)  POCT Blood Glucose.: 217 mg/dL (16 Feb 2023 17:00)        Assesment/plan    Patient states he was diagnosed with diabetes 7-8 years ago and has never been on insulin. Reports taking medications (jardiance and janumet) regularly. Per daughter when the patient started feeling sick around a week before admission he was not taking his medications but was still eating when he could despite poor appetite. At that time the patient's finger sticks at home were noted to be higher than usual. She noticed the patient's skin beginning to turn yellow and reports that the patient was urinating frequently with very yellow and "bubbly" urine. Just prior to coming to the hospital the patient had increased thirst and very dry mouth that made it difficult for him to swallow. Daughter says that she spoke with patient's PCP recently who said that the patient's blood sugars had been well controlled and his last A1c outpatient was 7. Patient eats a regular diet consisting of meat, fish, vegetables and rice and generally avoids sugary foods and sodas. Denies any recent changes to diet. Patient usually walks and does light exercise in the park every morning. Patient says that he has seen ophthalmologist and podiatrist a very long time ago but daughter is unaware if this is true. Development of HHS possibly contributed to by medication non-compliance in setting of recent flu like illness, however CT findings showing cystic lesion in the body of pancreas and increased direct hyperbilirubinemia concerning for a direct process affecting pancreatic function. Noted to have significant hypocalcemia, hypomagnesemia, and vitamin D deficiency.      #DM  better controlled but now on higher side advancing diet from clears  -continue with current Lantus 26U qhs  -increase premeal lispro 8U   -would rec out pt insulin rx  -fsg ac and hs  -dm teaching.    #Hypocalcemia  RESOLVED  -Today Ca2+ 8 (corrected 9.8), PTH elevated 194, 25-hydroxy VitD decreased 16.5  -Likely contributed by hypomagnesemia (1.4) and low vit d  -Magnesium 1.6 today   -s/p MgSO4 2g IV   -c/w PO Magnesium oxide 400mg TID with meals for 1-2 days  -c/w VitD3 1000U qd   -c/w VitD3 50,000U qweek for 12 weeks Interval Events: No acute events over night. Patient seen and examined at bedside. Reports continued fatigue and complains of being cold today. Reports that he is eating well.        Allergies    No Known Allergies    Intolerances      Endocrine/Metabolic Medications:  insulin glargine Injectable (LANTUS) 10 Unit(s) SubCutaneous at bedtime  insulin lispro (ADMELOG) corrective regimen sliding scale   SubCutaneous three times a day before meals  insulin lispro Injectable (ADMELOG) 6 Unit(s) SubCutaneous three times a day before meals      Vital Signs Last 24 Hrs  T(C): 36.7 (17 Feb 2023 05:17), Max: 37 (16 Feb 2023 13:43)  T(F): 98.1 (17 Feb 2023 05:17), Max: 98.6 (16 Feb 2023 13:43)  HR: 73 (17 Feb 2023 05:17) (73 - 82)  BP: 120/69 (17 Feb 2023 05:17) (120/69 - 125/74)  BP(mean): --  RR: 17 (17 Feb 2023 05:17) (17 - 18)  SpO2: 96% (17 Feb 2023 05:17) (95% - 96%)    Parameters below as of 17 Feb 2023 05:17  Patient On (Oxygen Delivery Method): room air          PHYSICAL EXAM  All physical exam findings normal, except those marked:  General:	Alert, active, cooperative, NAD, well hydrated  .		[] Abnormal:  Neck		Normal: supple, no cervical adenopathy, no palpable thyroid  .		[] Abnormal:  Cardiovascular	Normal: regular rate, normal S1, S2, no murmurs  .		[] Abnormal:  Respiratory	Normal: no chest wall deformity, normal respiratory pattern, CTA B/L  .		[] Abnormal:  Abdominal	Normal: soft, ND, NT, bowel sounds present, no masses, no organomegaly  .		[] Abnormal:  		Normal normal genitalia, testes descended, circumcised/uncircumcised  .		Yina stage:			Breast yina:  .		Menstrual history:  .		[] Abnormal:  Extremities	Normal: FROM x4  .		[] Abnormal:  Skin		Normal: intact and not indurated, no rash, no acanthosis nigricans  .		[x] Abnormal: jaundice   Neurologic	Normal: grossly intact  .		[] Abnormal:    LABS                        11.6   7.24  )-----------( 236      ( 17 Feb 2023 06:10 )             35.5                               140    |  107    |  19                  Calcium: 8.0   / iCa: x      (02-17 @ 06:10)    ----------------------------<  169       Magnesium: 1.6                              3.9     |  27     |  0.67             Phosphorous: 2.3      TPro  5.0    /  Alb  1.8    /  TBili  4.1    /  DBili  x      /  AST  39     /  ALT  77     /  AlkPhos  156    17 Feb 2023 06:10    CAPILLARY BLOOD GLUCOSE      POCT Blood Glucose.: 196 mg/dL (17 Feb 2023 11:47)  POCT Blood Glucose.: 242 mg/dL (17 Feb 2023 07:36)  POCT Blood Glucose.: 209 mg/dL (16 Feb 2023 21:16)  POCT Blood Glucose.: 217 mg/dL (16 Feb 2023 17:00)        Assesment/plan    Patient states he was diagnosed with diabetes 7-8 years ago and has never been on insulin. Reports taking medications (jardiance and janumet) regularly. Per daughter when the patient started feeling sick around a week before admission he was not taking his medications but was still eating when he could despite poor appetite. At that time the patient's finger sticks at home were noted to be higher than usual. She noticed the patient's skin beginning to turn yellow and reports that the patient was urinating frequently with very yellow and "bubbly" urine. Just prior to coming to the hospital the patient had increased thirst and very dry mouth that made it difficult for him to swallow. Daughter says that she spoke with patient's PCP recently who said that the patient's blood sugars had been well controlled and his last A1c outpatient was 7. Patient eats a regular diet consisting of meat, fish, vegetables and rice and generally avoids sugary foods and sodas. Denies any recent changes to diet. Patient usually walks and does light exercise in the park every morning. Patient says that he has seen ophthalmologist and podiatrist a very long time ago but daughter is unaware if this is true. Development of HHS possibly contributed to by medication non-compliance in setting of recent flu like illness, however CT findings showing cystic lesion in the body of pancreas and increased direct hyperbilirubinemia concerning for a direct process affecting pancreatic function. Noted to have significant hypocalcemia, hypomagnesemia, and vitamin D deficiency.      #DM  better controlled but now on higher side advancing diet from clears  -continue with current Lantus 10U qhs  -increase premeal lispro 8U   -would rec out pt insulin rx  -fsg ac and hs  -dm teaching.    #Hypocalcemia  RESOLVED  -Today Ca2+ 8 (corrected 9.8), PTH elevated 194, 25-hydroxy VitD decreased 16.5  -Likely contributed by hypomagnesemia (1.4) and low vit d  -Magnesium 1.6 today   -s/p MgSO4 2g IV   -c/w PO Magnesium oxide 400mg TID with meals for 1-2 days  -c/w VitD3 1000U qd   -c/w VitD3 50,000U qweek for 12 weeks Interval Events: No acute events over night. Patient seen and examined at bedside. Reports continued fatigue and complains of being cold today. Reports that he is eating well.        Allergies    No Known Allergies    Intolerances      Endocrine/Metabolic Medications:  insulin glargine Injectable (LANTUS) 10 Unit(s) SubCutaneous at bedtime  insulin lispro (ADMELOG) corrective regimen sliding scale   SubCutaneous three times a day before meals  insulin lispro Injectable (ADMELOG) 6 Unit(s) SubCutaneous three times a day before meals      Vital Signs Last 24 Hrs  T(C): 36.7 (17 Feb 2023 05:17), Max: 37 (16 Feb 2023 13:43)  T(F): 98.1 (17 Feb 2023 05:17), Max: 98.6 (16 Feb 2023 13:43)  HR: 73 (17 Feb 2023 05:17) (73 - 82)  BP: 120/69 (17 Feb 2023 05:17) (120/69 - 125/74)  BP(mean): --  RR: 17 (17 Feb 2023 05:17) (17 - 18)  SpO2: 96% (17 Feb 2023 05:17) (95% - 96%)    Parameters below as of 17 Feb 2023 05:17  Patient On (Oxygen Delivery Method): room air          PHYSICAL EXAM  All physical exam findings normal, except those marked:  General:	Alert, active, cooperative, NAD, well hydrated  .		[] Abnormal:  Neck		Normal: supple, no cervical adenopathy, no palpable thyroid  .		[] Abnormal:  Cardiovascular	Normal: regular rate, normal S1, S2, no murmurs  .		[] Abnormal:  Respiratory	Normal: no chest wall deformity, normal respiratory pattern, CTA B/L  .		[] Abnormal:  Abdominal	Normal: soft, ND, NT, bowel sounds present, no masses, no organomegaly  .		[] Abnormal:  		Normal normal genitalia, testes descended, circumcised/uncircumcised  .		Yina stage:			Breast yina:  .		Menstrual history:  .		[] Abnormal:  Extremities	Normal: FROM x4  .		[] Abnormal:  Skin		Normal: intact and not indurated, no rash, no acanthosis nigricans  .		[x] Abnormal: jaundice   Neurologic	Normal: grossly intact  .		[] Abnormal:    LABS                        11.6   7.24  )-----------( 236      ( 17 Feb 2023 06:10 )             35.5                               140    |  107    |  19                  Calcium: 8.0   / iCa: x      (02-17 @ 06:10)    ----------------------------<  169       Magnesium: 1.6                              3.9     |  27     |  0.67             Phosphorous: 2.3      TPro  5.0    /  Alb  1.8    /  TBili  4.1    /  DBili  x      /  AST  39     /  ALT  77     /  AlkPhos  156    17 Feb 2023 06:10    CAPILLARY BLOOD GLUCOSE      POCT Blood Glucose.: 196 mg/dL (17 Feb 2023 11:47)  POCT Blood Glucose.: 242 mg/dL (17 Feb 2023 07:36)  POCT Blood Glucose.: 209 mg/dL (16 Feb 2023 21:16)  POCT Blood Glucose.: 217 mg/dL (16 Feb 2023 17:00)        Assesment/plan    Patient states he was diagnosed with diabetes 7-8 years ago and has never been on insulin. Reports taking medications (jardiance and janumet) regularly. Per daughter when the patient started feeling sick around a week before admission he was not taking his medications but was still eating when he could despite poor appetite. At that time the patient's finger sticks at home were noted to be higher than usual. She noticed the patient's skin beginning to turn yellow and reports that the patient was urinating frequently with very yellow and "bubbly" urine. Just prior to coming to the hospital the patient had increased thirst and very dry mouth that made it difficult for him to swallow. Daughter says that she spoke with patient's PCP recently who said that the patient's blood sugars had been well controlled and his last A1c outpatient was 7. Patient eats a regular diet consisting of meat, fish, vegetables and rice and generally avoids sugary foods and sodas. Denies any recent changes to diet. Patient usually walks and does light exercise in the park every morning. Patient says that he has seen ophthalmologist and podiatrist a very long time ago but daughter is unaware if this is true. Development of HHS possibly contributed to by medication non-compliance in setting of recent flu like illness, however CT findings showing cystic lesion in the body of pancreas and increased direct hyperbilirubinemia concerning for a direct process affecting pancreatic function. Noted to have significant hypocalcemia, hypomagnesemia, and vitamin D deficiency.      #DM  better controlled but now on higher side post meals advancing diet from clears  -continue with current Lantus 10U qhs  -increase premeal lispro 8U   -would rec out pt insulin rx  -fsg ac and hs  -dm teaching.    #Hypocalcemia  RESOLVED  -Today Ca2+ 8 (corrected 9.8), PTH elevated 194, 25-hydroxy VitD decreased 16.5  -Likely contributed by hypomagnesemia (1.4) and low vit d  -Magnesium 1.6 today   -s/p MgSO4 2g IV   -c/w PO Magnesium oxide 400mg TID with meals for 1-2 days  -c/w VitD3 1000U qd   -c/w VitD3 50,000U qweek for 12 weeks

## 2023-02-17 NOTE — PROGRESS NOTE ADULT - PROBLEM SELECTOR PLAN 8
Hypomagnesemia, Hypophosphatemia, Hypocalcemia  replaced phosphorus   replaced magnesium  continue Vit D 200 units daily, 50K units weekly   c/w calcium carbonate, mag oxide   Monitor daily phos & Mag  Endo Dr. Jallho following
- Pt is from home  - Disposition is pending clinical course
- Pt is from home  - Disposition is pending MRCP & echo
Hypomagnesemia, Hypophosphatemia, Hypocalcemia  replaced phosphorus   replaced magnesium  continue Vit D 200 units daily, 50K units weekly   c/w calcium carbonate, mag oxide   Monitor daily phos & Mag  Endo Dr. Jalloh following
Hypomagnesemia, Hypophosphatemia, Hypocalcemia  replace phosphorus   replace magnesium  continue Vit D 200 units daily, 50K units weekly   c/w calcium carbonate, mag oxide   Monitor daily phos & Mag  Endo Dr. Jalloh following

## 2023-02-17 NOTE — CONSULT NOTE ADULT - SUBJECTIVE AND OBJECTIVE BOX
HPI:  67 year old male PMH CAD s/p CABG, DM, HLD coming in with jaundice and generalized body aches. Patient is lethargic and unable to provide much history. As per ED provider, " his symptoms started about 5 days ago initially with flu-like symptoms and vomiting. went to PMD and was given a nausea medication and felt improved but now feeling worse again. daughter states jaundice is also getting worse. has lost about 10-15lbs in the past week."          PAST MEDICAL & SURGICAL HISTORY:  CAD (coronary artery disease)      Diabetes mellitus      HLD (hyperlipidemia)          No Known Allergies      Meds:  calcium carbonate   1250 mG (OsCal) 1 Tablet(s) Oral two times a day  cefTRIAXone   IVPB 1000 milliGRAM(s) IV Intermittent every 24 hours  cholecalciferol 1000 Unit(s) Oral daily  ergocalciferol 81225 Unit(s) Oral <User Schedule>  insulin glargine Injectable (LANTUS) 10 Unit(s) SubCutaneous at bedtime  insulin lispro (ADMELOG) corrective regimen sliding scale   SubCutaneous three times a day before meals  insulin lispro Injectable (ADMELOG) 8 Unit(s) SubCutaneous three times a day before meals  magnesium oxide 400 milliGRAM(s) Oral two times a day with meals  NIFEdipine XL 30 milliGRAM(s) Oral daily  polyethylene glycol 3350 17 Gram(s) Oral daily PRN  potassium phosphate / sodium phosphate Powder (PHOS-NaK) 1 Packet(s) Oral three times a day      SOCIAL HISTORY:  Smoker:  YES / NO        PACK YEARS:                         WHEN QUIT?  ETOH use:  YES / NO               FREQUENCY / QUANTITY:  [employer] Drug use:  YES / NO  Occupation:  Assisted device use (Cane / Walker):  Live with:    FAMILY HISTORY:      VITALS:  Vital Signs Last 24 Hrs  T(C): 36.6 (17 Feb 2023 14:26), Max: 36.8 (16 Feb 2023 21:23)  T(F): 97.8 (17 Feb 2023 14:26), Max: 98.3 (16 Feb 2023 21:23)  HR: 110 (17 Feb 2023 14:26) (73 - 110)  BP: 106/58 (17 Feb 2023 14:26) (106/58 - 124/64)  BP(mean): --  RR: 22 (17 Feb 2023 14:26) (17 - 22)  SpO2: 95% (17 Feb 2023 14:26) (95% - 96%)    Parameters below as of 17 Feb 2023 14:26  Patient On (Oxygen Delivery Method): room air        LABS/DIAGNOSTIC TESTS:                          11.6   7.24  )-----------( 236      ( 17 Feb 2023 06:10 )             35.5     WBC Count: 7.24 K/uL (02-17 @ 06:10)  WBC Count: 8.83 K/uL (02-16 @ 06:05)  WBC Count: 10.49 K/uL (02-15 @ 06:58)      02-17    140  |  107  |  19<H>  ----------------------------<  169<H>  3.9   |  27  |  0.67    Ca    8.0<L>      17 Feb 2023 06:10  Phos  2.3     02-17  Mg     1.6     02-17    TPro  5.0<L>  /  Alb  1.8<L>  /  TBili  4.1<H>  /  DBili  x   /  AST  39  /  ALT  77<H>  /  AlkPhos  156<H>  02-17          LIVER FUNCTIONS - ( 17 Feb 2023 06:10 )  Alb: 1.8 g/dL / Pro: 5.0 g/dL / ALK PHOS: 156 U/L / ALT: 77 U/L DA / AST: 39 U/L / GGT: x             PT/INR - ( 17 Feb 2023 06:10 )   PT: 12.4 sec;   INR: 1.04 ratio         PTT - ( 17 Feb 2023 06:10 )  PTT:30.5 sec    LACTATE:    ABG -     CULTURES:   Clean Catch Clean Catch (Midstream)  02-10 @ 02:35   <10,000 CFU/mL Normal Urogenital Nerissa  --  --          RADIOLOGY:< from: CT Chest No Cont (02.16.23 @ 09:45) >  ACC: [id number] EXAM:  CT CHEST   ORDERED BY: DEZ DAHL     PROCEDURE DATE:  02/16/2023          INTERPRETATION:  Clinical indication: Follow-up on abnormal abdominal   CT/pneumonia.    Axial CT images of the chest are obtained without intravenous   administration of contrast.    Comparison is made with the abdominal CT of February 10, 2023.    No enlarged axillary, mediastinal or hilar lymph nodes.    Small pericardial effusion is predominantly new since the abdominal CT of   February 10,2023. Heart size is normal.    For complete evaluation of the abdomen, please refer to the recently   performed abdominal CT of February 10, 2023 and the MRI of February 14, 2023.    Small bilateral pleural effusions, right greater than left are new since   February 10, 2023.    Evaluation of evaluation of the lung parenchyma is limited due to   superimposed motion artifact. Bilateral lower lobe partial areas of   compressive or dependent atelectasis adjacent to the pleural effusions.   Mild right lower lobe linear or subsegmental atelectasis. A few   previously described foci of impacted distal airways within the lower   aspect of the right middle lobe as noted on the abdominal CT of February   10, 2023.    Nonspecific ill-defined patchy area of groundglass opacity within the   dependent portion of the left upper lobe adjacent to the fissure given   superimposed motion artifact. No central endobronchial lesions.    No significant bony abnormality.    IMPRESSION: Small bilateral pleuraleffusions new since February 10, 2023   with adjacent bilateral lower lobe partial areas of atelectasis.    Small pericardial effusion new since February 10, 2023.    Ill-defined nonspecific left upper lobe patchy groundglass opacity.   Differential diagnosis include but is not limited to focus of   infection/inflammation. A 3 month follow-up noncontrast chest CT is   recommended to ensure resolution.    --- End of Report ---            TIFFANY PEDRO MD; Attending Radiologist  This document has been electronically signed. Feb 16 2023  3:45PM    < end of copied text >  ----------------------------------------------------------------------------------------------------------------------------------------------------------------------------------------------------------------  ACC: [id number] EXAM:  MR MRCP [facility] IC   ORDERED BY: ENEDELIA IZQUIERDO     PROCEDURE DATE:  02/14/2023          INTERPRETATION:  CLINICAL INFORMATION: Hyperbilirubinemia    COMPARISON: No prior abdominal MR is available for comparison. Reference   is made with a previous abdominal CT dated 2/10/2023.    CONTRAST/COMPLICATIONS:  IV Contrast: Gadavist  6 cc administered   1.5 cc discarded  Oral Contrast: NONE  Complications: None reported at time of study completion    PROCEDURE:  MRI of the abdomen was performed.  MRCP was performed.    FINDINGS: Respiratory motion artifact on postcontrast images limits   evaluation.  LOWER CHEST: New mild right pleural effusion.    LIVER: Within normal limits.  BILE DUCTS: Normal caliber. The common bile duct measures up to 6 mm in   caliber which is within normal limits. No evidence for   choledocholithiasis.  GALLBLADDER: Within normal limits.  SPLEEN: Within normal limits.  PANCREAS: Within normal limits.  ADRENALS: Within normal limits.  KIDNEYS/URETERS: Within normal limits except for a few tiny right renal   cysts.    VISUALIZED PORTIONS:  BOWEL: Periampullary duodenal diverticulum.  PERITONEUM: Trace ascites  VESSELS: Within normal limits.  RETROPERITONEUM/LYMPH NODES: No lymphadenopathy.  ABDOMINAL WALL: Within normal limits.  BONES: Within normal limits.    IMPRESSION:  The common bile duct measures up to 6 mm in caliber which is within   normal limits. No evidence for choledocholithiasis.    Trace ascites.    New mild right pleural effusion.    --- End of Report ---            NETO WILSON MD; Attending Radiologist  This document has been electronically signed. Feb 14 2023  1:30PM    < end of copied text >        ROS  [  ] UNABLE TO ELICIT               HPI:  67 year old male PMH CAD s/p CABG, DM, HLD coming in with jaundice and generalized body aches. Patient is lethargic and unable to provide much history. As per ED provider, " his symptoms started about 5 days ago initially with flu-like symptoms and vomiting. went to PMD and was given a nausea medication and felt improved but now feeling worse again. daughter states jaundice is also getting worse. has lost about 10-15lbs in the past week."        History as above, asked to see this patient who presented with jaundice and generalized body aches and lethargy , he is a poor historian but was found to have pneumonia and no other cause for his elevated LFTs and was felt to be secondary to sepsis from his pneumonia. He has improved dramatically on treatment with Rocephin and his LFTs are decreasing. He has no fevers or chills, he has a slight cough still but no SOB or chest pain, no nausea , vomiting or diarrhea. He missed a dose of Rocephin yesterday.        PAST MEDICAL & SURGICAL HISTORY:  CAD (coronary artery disease)      Diabetes mellitus      HLD (hyperlipidemia)          No Known Allergies      Meds:  calcium carbonate   1250 mG (OsCal) 1 Tablet(s) Oral two times a day  cefTRIAXone   IVPB 1000 milliGRAM(s) IV Intermittent every 24 hours  cholecalciferol 1000 Unit(s) Oral daily  ergocalciferol 20547 Unit(s) Oral <User Schedule>  insulin glargine Injectable (LANTUS) 10 Unit(s) SubCutaneous at bedtime  insulin lispro (ADMELOG) corrective regimen sliding scale   SubCutaneous three times a day before meals  insulin lispro Injectable (ADMELOG) 8 Unit(s) SubCutaneous three times a day before meals  magnesium oxide 400 milliGRAM(s) Oral two times a day with meals  NIFEdipine XL 30 milliGRAM(s) Oral daily  polyethylene glycol 3350 17 Gram(s) Oral daily PRN  potassium phosphate / sodium phosphate Powder (PHOS-NaK) 1 Packet(s) Oral three times a day      SOCIAL HISTORY:  Smoker:  denies  ETOH use:  denies      FAMILY HISTORY: not contributory      VITALS:  Vital Signs Last 24 Hrs  T(C): 36.6 (17 Feb 2023 14:26), Max: 36.8 (16 Feb 2023 21:23)  T(F): 97.8 (17 Feb 2023 14:26), Max: 98.3 (16 Feb 2023 21:23)  HR: 110 (17 Feb 2023 14:26) (73 - 110)  BP: 106/58 (17 Feb 2023 14:26) (106/58 - 124/64)  BP(mean): --  RR: 22 (17 Feb 2023 14:26) (17 - 22)  SpO2: 95% (17 Feb 2023 14:26) (95% - 96%)    Parameters below as of 17 Feb 2023 14:26  Patient On (Oxygen Delivery Method): room air        LABS/DIAGNOSTIC TESTS:                          11.6   7.24  )-----------( 236      ( 17 Feb 2023 06:10 )             35.5     WBC Count: 7.24 K/uL (02-17 @ 06:10)  WBC Count: 8.83 K/uL (02-16 @ 06:05)  WBC Count: 10.49 K/uL (02-15 @ 06:58)      02-17    140  |  107  |  19<H>  ----------------------------<  169<H>  3.9   |  27  |  0.67    Ca    8.0<L>      17 Feb 2023 06:10  Phos  2.3     02-17  Mg     1.6     02-17    TPro  5.0<L>  /  Alb  1.8<L>  /  TBili  4.1<H>  /  DBili  x   /  AST  39  /  ALT  77<H>  /  AlkPhos  156<H>  02-17          LIVER FUNCTIONS - ( 17 Feb 2023 06:10 )  Alb: 1.8 g/dL / Pro: 5.0 g/dL / ALK PHOS: 156 U/L / ALT: 77 U/L DA / AST: 39 U/L / GGT: x             PT/INR - ( 17 Feb 2023 06:10 )   PT: 12.4 sec;   INR: 1.04 ratio         PTT - ( 17 Feb 2023 06:10 )  PTT:30.5 sec    LACTATE:    ABG -     CULTURES:   Clean Catch Clean Catch (Midstream)  02-10 @ 02:35   <10,000 CFU/mL Normal Urogenital Nerissa  --  --          RADIOLOGY:< from: CT Chest No Cont (02.16.23 @ 09:45) >  ACC: [id number] EXAM:  CT CHEST   ORDERED BY: DEZ UJDYKATIA     PROCEDURE DATE:  02/16/2023          INTERPRETATION:  Clinical indication: Follow-up on abnormal abdominal   CT/pneumonia.    Axial CT images of the chest are obtained without intravenous   administration of contrast.    Comparison is made with the abdominal CT of February 10, 2023.    No enlarged axillary, mediastinal or hilar lymph nodes.    Small pericardial effusion is predominantly new since the abdominal CT of   February 10,2023. Heart size is normal.    For complete evaluation of the abdomen, please refer to the recently   performed abdominal CT of February 10, 2023 and the MRI of February 14, 2023.    Small bilateral pleural effusions, right greater than left are new since   February 10, 2023.    Evaluation of evaluation of the lung parenchyma is limited due to   superimposed motion artifact. Bilateral lower lobe partial areas of   compressive or dependent atelectasis adjacent to the pleural effusions.   Mild right lower lobe linear or subsegmental atelectasis. A few   previously described foci of impacted distal airways within the lower   aspect of the right middle lobe as noted on the abdominal CT of February   10, 2023.    Nonspecific ill-defined patchy area of groundglass opacity within the   dependent portion of the left upper lobe adjacent to the fissure given   superimposed motion artifact. No central endobronchial lesions.    No significant bony abnormality.    IMPRESSION: Small bilateral pleuraleffusions new since February 10, 2023   with adjacent bilateral lower lobe partial areas of atelectasis.    Small pericardial effusion new since February 10, 2023.    Ill-defined nonspecific left upper lobe patchy groundglass opacity.   Differential diagnosis include but is not limited to focus of   infection/inflammation. A 3 month follow-up noncontrast chest CT is   recommended to ensure resolution.    --- End of Report ---            TIFFANY PEDRO MD; Attending Radiologist  This document has been electronically signed. Feb 16 2023  3:45PM    < end of copied text >  ----------------------------------------------------------------------------------------------------------------------------------------------------------------------------------------------------------------  ACC: [id number] EXAM:  MR MRCP [facility] IC   ORDERED BY: ENEDELIA IZQUIERDO     PROCEDURE DATE:  02/14/2023          INTERPRETATION:  CLINICAL INFORMATION: Hyperbilirubinemia    COMPARISON: No prior abdominal MR is available for comparison. Reference   is made with a previous abdominal CT dated 2/10/2023.    CONTRAST/COMPLICATIONS:  IV Contrast: Gadavist  6 cc administered   1.5 cc discarded  Oral Contrast: NONE  Complications: None reported at time of study completion    PROCEDURE:  MRI of the abdomen was performed.  MRCP was performed.    FINDINGS: Respiratory motion artifact on postcontrast images limits   evaluation.  LOWER CHEST: New mild right pleural effusion.    LIVER: Within normal limits.  BILE DUCTS: Normal caliber. The common bile duct measures up to 6 mm in   caliber which is within normal limits. No evidence for   choledocholithiasis.  GALLBLADDER: Within normal limits.  SPLEEN: Within normal limits.  PANCREAS: Within normal limits.  ADRENALS: Within normal limits.  KIDNEYS/URETERS: Within normal limits except for a few tiny right renal   cysts.    VISUALIZED PORTIONS:  BOWEL: Periampullary duodenal diverticulum.  PERITONEUM: Trace ascites  VESSELS: Within normal limits.  RETROPERITONEUM/LYMPH NODES: No lymphadenopathy.  ABDOMINAL WALL: Within normal limits.  BONES: Within normal limits.    IMPRESSION:  The common bile duct measures up to 6 mm in caliber which is within   normal limits. No evidence for choledocholithiasis.    Trace ascites.    New mild right pleural effusion.    --- End of Report ---            NETO WILSON MD; Attending Radiologist  This document has been electronically signed. Feb 14 2023  1:30PM    < end of copied text >        ROS  [  ] UNABLE TO ELICIT

## 2023-02-17 NOTE — PROGRESS NOTE ADULT - PROBLEM SELECTOR PLAN 6
- Ct A/P showing  a small 3mm pancreatic cyst and tree in bud opacities in right middle lobe.  - Rocephin started
- Ct A/P showing  a small 3mm pancreatic cyst and tree in bud opacities in right middle lobe.  - Rocephin started  - F/U Bc
On Xarelto at home  Echo showing normal left ventricular systolic function , EF 55-60%  Cardio Dr. Bhardwaj following  Xeralto on hold, pt may need liver biopsy

## 2023-02-17 NOTE — PROGRESS NOTE ADULT - PROBLEM SELECTOR PROBLEM 4
KYLE (acute kidney injury)
KYLE (acute kidney injury)
CAD (coronary artery disease)
CAD (coronary artery disease)
KYLE (acute kidney injury)

## 2023-02-17 NOTE — PROGRESS NOTE ADULT - PROBLEM SELECTOR PROBLEM 3
KYLE (acute kidney injury)
Diabetes mellitus
KYLE (acute kidney injury)

## 2023-02-17 NOTE — PROGRESS NOTE ADULT - PROBLEM SELECTOR PROBLEM 1
Hyperosmolar hyperglycemic state (HHS)
Hyperbilirubinemia
Hyperosmolar hyperglycemic state (HHS)
Hyperbilirubinemia
Hyperbilirubinemia

## 2023-02-17 NOTE — PROGRESS NOTE ADULT - SUBJECTIVE AND OBJECTIVE BOX
NP Note discussed with  primary attending    Patient is a 67y old  Male who presents with a chief complaint of hyperosmolar hyperglycemic state (17 Feb 2023 10:41)      INTERVAL HPI/OVERNIGHT EVENTS: no new complaints    MEDICATIONS  (STANDING):  calcium carbonate   1250 mG (OsCal) 1 Tablet(s) Oral two times a day  cholecalciferol 1000 Unit(s) Oral daily  ergocalciferol 05406 Unit(s) Oral <User Schedule>  insulin glargine Injectable (LANTUS) 10 Unit(s) SubCutaneous at bedtime  insulin lispro (ADMELOG) corrective regimen sliding scale   SubCutaneous three times a day before meals  insulin lispro Injectable (ADMELOG) 6 Unit(s) SubCutaneous three times a day before meals  magnesium oxide 400 milliGRAM(s) Oral two times a day with meals  NIFEdipine XL 30 milliGRAM(s) Oral daily  potassium phosphate / sodium phosphate Powder (PHOS-NaK) 1 Packet(s) Oral three times a day    MEDICATIONS  (PRN):  polyethylene glycol 3350 17 Gram(s) Oral daily PRN Constipation      __________________________________________________  REVIEW OF SYSTEMS:    CONSTITUTIONAL: No fever,   EYES: no acute visual disturbances  NECK: No pain or stiffness  RESPIRATORY: No cough; No shortness of breath  CARDIOVASCULAR: No chest pain, no palpitations  GASTROINTESTINAL: No pain. No nausea or vomiting; No diarrhea   NEUROLOGICAL: No headache or numbness, no tremors  MUSCULOSKELETAL: No joint pain, no muscle pain  GENITOURINARY: no dysuria, no frequency, no hesitancy  PSYCHIATRY: no depression , no anxiety  ALL OTHER  ROS negative        Vital Signs Last 24 Hrs  T(C): 36.7 (17 Feb 2023 05:17), Max: 37 (16 Feb 2023 13:43)  T(F): 98.1 (17 Feb 2023 05:17), Max: 98.6 (16 Feb 2023 13:43)  HR: 73 (17 Feb 2023 05:17) (73 - 82)  BP: 120/69 (17 Feb 2023 05:17) (120/69 - 125/74)  BP(mean): --  RR: 17 (17 Feb 2023 05:17) (17 - 18)  SpO2: 96% (17 Feb 2023 05:17) (95% - 96%)    Parameters below as of 17 Feb 2023 05:17  Patient On (Oxygen Delivery Method): room air        ________________________________________________  PHYSICAL EXAM:  GENERAL: NAD  HEENT: Normocephalic;  + jaundice via conjunctivae and sclerae; moist mucous membranes;   NECK : supple  CHEST/LUNG: Clear to ausculitation bilaterally with good air entry   HEART: S1 S2  regular; no murmurs, gallops or rubs  ABDOMEN: Soft, Nontender, Nondistended; Bowel sounds present  EXTREMITIES: no cyanosis; no edema; no calf tenderness  SKIN: + jaundice all over skin, warm and dry; no rash  NERVOUS SYSTEM:  Awake and alert; Oriented  to place, person and time ; no new deficits    _________________________________________________  LABS:                        11.6   7.24  )-----------( 236      ( 17 Feb 2023 06:10 )             35.5     02-17    140  |  107  |  19<H>  ----------------------------<  169<H>  3.9   |  27  |  0.67    Ca    8.0<L>      17 Feb 2023 06:10  Phos  2.3     02-17  Mg     1.6     02-17    TPro  5.0<L>  /  Alb  1.8<L>  /  TBili  4.1<H>  /  DBili  x   /  AST  39  /  ALT  77<H>  /  AlkPhos  156<H>  02-17    PT/INR - ( 17 Feb 2023 06:10 )   PT: 12.4 sec;   INR: 1.04 ratio         PTT - ( 17 Feb 2023 06:10 )  PTT:30.5 sec    CAPILLARY BLOOD GLUCOSE      POCT Blood Glucose.: 196 mg/dL (17 Feb 2023 11:47)  POCT Blood Glucose.: 242 mg/dL (17 Feb 2023 07:36)  POCT Blood Glucose.: 209 mg/dL (16 Feb 2023 21:16)  POCT Blood Glucose.: 217 mg/dL (16 Feb 2023 17:00)        RADIOLOGY & ADDITIONAL TESTS:  < from: CT Chest No Cont (02.16.23 @ 09:45) >    IMPRESSION: Small bilateral pleural effusions new since February 10, 2023   with adjacent bilateral lower lobe partial areas of atelectasis.    Small pericardial effusion new since February 10, 2023.    Ill-defined nonspecific left upper lobe patchy ground glass opacity.   Differential diagnosis include but is not limited to focus of   infection/inflammation. A 3 month follow-up noncontrast chest CT is   recommended to ensure resolution.    --- End of Report ---    < end of copied text >    Imaging Personally Reviewed:  YES    Consultant(s) Notes Reviewed:   YES    Care Discussed with Consultants :     Plan of care was discussed with patient and /or primary care giver; all questions and concerns were addressed and care was aligned with patient's wishes.

## 2023-02-17 NOTE — PROGRESS NOTE ADULT - PROBLEM SELECTOR PLAN 1
- Adm with BG 1054  - K 2.6  - S/P 3L IVFs In ICU  - S/P KCL replacement  - On Lantus 10U  - C/W SSI, with FS  - A1c 9.1  - HHS resolved now
direct hyperbilirubinemia, with mild liver enzyme elevation  MRCP - no obstruction no stones    No obstruction on MRCP  F/U AMA, SMA, LKM, Ig panel, ANCA  f/u Hep B serology, Hep E IgM/PCR.  Avoid hepatotoxic medications, hold  statin and allopurinol   GI on board   Hepatology on board   monitor LFTs closely, if no further improvement or worsening, might need liver biopsy as per hepatology
- Adm with BG 1054 & - K 2.6  - S/P 3L IVFs In ICU  - S/P KCL replacement  - On Lantus 10U  - C/W SSI, with FS  - A1c 9.1  - HHS resolved now

## 2023-02-17 NOTE — CONSULT NOTE ADULT - REASON FOR ADMISSION
hyperosmolar hyperglycemic state

## 2023-02-17 NOTE — DISCHARGE NOTE PROVIDER - NSDCCPCAREPLAN_GEN_ALL_CORE_FT
PRINCIPAL DISCHARGE DIAGNOSIS  Diagnosis: Diabetic hyperosmolar non-ketotic state  Assessment and Plan of Treatment: You were admitted with high blood sugar in your blood without ketones likely due to poor oral intake. You were admitted to the ICU, & treated with insulin drip & IV fluids.   An endocrinologist was also consulted for the management of your high blood sugar levels.   Follow up with your PCP 1 week after discharge.      SECONDARY DISCHARGE DIAGNOSES  Diagnosis: Jaundice  Assessment and Plan of Treatment: You were noted with elevated bilirubin & Jaundice which is the yellowish color in the skin caused by the build up of bilirubin in the blood. This usually occurs when the liver cannot efficiently process red blood cells as they break down.   You underwent MRCP which showed no gal stone in you gallblader duct. Ahepatologist was also involved in your care.   You will be followed up in out patient setting with your PCP 1 week after discharge for further work up.      Diagnosis: Pneumonia  Assessment and Plan of Treatment:     Diagnosis: KYLE (acute kidney injury)  Assessment and Plan of Treatment:      PRINCIPAL DISCHARGE DIAGNOSIS  Diagnosis: Diabetic hyperosmolar non-ketotic state  Assessment and Plan of Treatment: You were admitted with high blood sugar in your blood without ketones likely due to poor oral intake. You were admitted to the ICU, & treated with insulin drip & IV fluids.   An endocrinologist was also consulted for the management of your high blood sugar levels.   Follow up with your PCP 1 week after discharge.      SECONDARY DISCHARGE DIAGNOSES  Diagnosis: Jaundice  Assessment and Plan of Treatment: You were noted with elevated bilirubin & Jaundice which is the yellowish color in the skin caused by the build up of bilirubin in the blood. This usually occurs when the liver cannot efficiently process red blood cells as they break down.   You underwent MRCP which showed no gal stone in you gallblader duct. Ahepatologist was also involved in your care.   You will be followed up in out patient setting with your PCP 1 week after discharge for further work up.      Diagnosis: Pneumonia  Assessment and Plan of Treatment: You were suspected to have pneumonia because your CT A/P revealed a tree in bud opacity suggesting pneumonia. You were treated with IV antibiotics, & you completed the course for 5 days.   Continue to follow up with your PCP after discharge.      Diagnosis: KYLE (acute kidney injury)  Assessment and Plan of Treatment: You were noted with acute kidney injury     PRINCIPAL DISCHARGE DIAGNOSIS  Diagnosis: Diabetic hyperosmolar non-ketotic state  Assessment and Plan of Treatment: You were admitted with high blood sugar in your blood without ketones likely due to poor oral intake. You were admitted to the ICU, & treated with insulin drip & IV fluids.   An endocrinologist was also consulted for the management of your high blood sugar levels.   Follow up with your PCP 1 week after discharge.      SECONDARY DISCHARGE DIAGNOSES  Diagnosis: Jaundice  Assessment and Plan of Treatment: You were noted with elevated bilirubin & Jaundice which is the yellowish color in the skin caused by the build up of bilirubin in the blood. This usually occurs when the liver cannot efficiently process red blood cells as they break down.   You underwent MRCP which showed no gal stone in you gallblader duct. A hepatologist & a GI doctor were also involved in your care.   You will be followed up in out patient setting with your PCP 1 week after discharge for further work up.      Diagnosis: Pneumonia  Assessment and Plan of Treatment: You were suspected to have pneumonia because your CT A/P revealed a tree in bud opacity suggesting pneumonia. You were treated with IV antibiotics, & you completed the course for 5 days.   Continue to follow up with your PCP after discharge.      Diagnosis: KYLE (acute kidney injury)  Assessment and Plan of Treatment: You were noted with acute kidney injury (KYLE) likely due to poor oral intake. Your were treated with IV fluids. Your kidney functions are improving slowly.  Avoid taking (NSAIDs) - (ex: Ibuprofen, Advil, Celebrex, Naprosyn)  Avoid taking any nephrotoxic agents (can harm kidneys) - Intravenous contrast for diagnostic testing, combination cold medications.  Have all medications adjusted for your renal function by your Health Care Provider.  Blood pressure control is important.  Take all medication as prescribed.       PRINCIPAL DISCHARGE DIAGNOSIS  Diagnosis: Diabetic hyperosmolar non-ketotic state  Assessment and Plan of Treatment: You were admitted with high blood sugar in your blood without ketones likely due to poor oral intake. You were admitted to the ICU, & treated with insulin drip & IV fluids.   An endocrinologist was also consulted for the management of your high blood sugar levels.   Follow up with your PCP 1 week after discharge.      SECONDARY DISCHARGE DIAGNOSES  Diagnosis: Jaundice  Assessment and Plan of Treatment: You were noted with elevated bilirubin & Jaundice which is the yellowish color in the skin caused by the build up of bilirubin in the blood. This usually occurs when the liver cannot efficiently process red blood cells as they break down.   You underwent MRCP which showed no gal stone in you gallblader duct. A hepatologist & a GI doctor were also involved in your care.   You will be followed up in out patient setting with your PCP 1 week after discharge for further work up.      Diagnosis: Pneumonia  Assessment and Plan of Treatment: You were suspected to have pneumonia because your CT A/P revealed a tree in bud opacity suggesting pneumonia. You were treated with IV antibiotics, & you completed the course for 5 days.   Continue to follow up with your PCP after discharge.      Diagnosis: KYLE (acute kidney injury)  Assessment and Plan of Treatment: You were noted with acute kidney injury (KYLE) likely due to poor oral intake. Your were treated with IV fluids. Your kidney functions are improving slowly.  Avoid taking (NSAIDs) - (ex: Ibuprofen, Advil, Celebrex, Naprosyn)  Avoid taking any nephrotoxic agents (can harm kidneys) - Intravenous contrast for diagnostic testing, combination cold medications.  Have all medications adjusted for your renal function by your Health Care Provider.  Blood pressure control is important.  Take all medication as prescribed.      Diagnosis: Vitamin D deficiency  Assessment and Plan of Treatment: You were noted with vitamin D deficiency secondary to hypocalcemia.   An endocrine doctor was involved with your care for this deficiency.  You are adviced to continue to take Vitamin D very wek for 12 weeks & Calcium carbonate as prescribed.  Follow up with out patient endocrinologist for further follow up.

## 2023-02-17 NOTE — DISCHARGE NOTE PROVIDER - CARE PROVIDER_API CALL
MD Ben  [phone]  Phone: (   )    -  Fax: (   )    -  Follow Up Time: 1 week    Chris Stroud)  Gastroenterology; Internal Medicine  [address] [facility] [place] [address]  [place] [address]  Phone: [phone]  Fax: [phone]  Follow Up Time: 1 week   Chris Stroud)  Gastroenterology; Internal Medicine  [address] [facility] [address] [place] [address] [place] [address]  Phone: [phone]  Fax: [phone]  Follow Up Time: 1 week    Julio Bales)  Internal Medicine  [address] [place] [address]  Phone: [phone]  Fax: [phone]  Follow Up Time: 1 week    Arben Bhardwaj)  Cardiovascular Disease  [address] [facility] [address]  [place] [address]  Phone: [phone]  Fax: [phone]  Follow Up Time: 2 weeks    MD Ben  [phone]  Phone: (   )    -  Fax: (   )    -  Follow Up Time: 1 week    Zaina Jalloh)  EndocrinologyMetabDiabetes  [address] [place] [address]  Phone: [phone]  Fax: [phone]  Follow Up Time: 2 weeks

## 2023-02-17 NOTE — DISCHARGE NOTE PROVIDER - HOSPITAL COURSE
66 yo Male, from home, with PMH CAD s/p CABG, DM, HLD presenting with jaundice and lethargy. Daughter endorsed that pt has lost about 10-15lbs in the past week."   Patient was noted to have blood glucose > 1000 with severe hypokalemia, KYLE, and elevated bilirubin levels. He was admitted to the ICU for HHS management. Endo Dr. Jalloh consulted. He received 6L IVF boluses and potassium supplements. Patient's blood sugar levels improved.  Patient was also found to have elevated bilirubin of 14.5, CT abdomen and pelvis was performed, no obstructing lesions found, there was a small 3mm pancreatic cyst and tree in bud opacities in right middle lobe. Empiric ceftriaxone was started. GI Dr. Stroud consulted, MRCP recommended which showed gallbladder duct WNL with no gall stones. Hepatology consulted.  Patient also had thrombocytopenia, no signs of bleeding, normal morphology with no schistocytes. D5w was started for hypernatremia. Lion was discontinued, urine culture was negative. Patient is was downgraded to medicine floor on 2/11. Echo done showing EF 55-60%. PT recs home PT.   CT chest done showing left increased patchy ground-glass opacity.   68 yo Male, from home, with PMH CAD s/p CABG, DM, HLD presenting with jaundice and lethargy. Daughter endorsed that pt has lost about 10-15lbs in the past week."   Patient was noted to have blood glucose > 1000 with severe hypokalemia, KYLE, and elevated bilirubin levels. He was admitted to the ICU for HHS management. Endo Dr. Jalloh consulted. He received 6L IVF boluses and potassium supplements. Patient's blood sugar levels improved.  Patient was also found to have elevated bilirubin of 14.5, CT abdomen and pelvis was performed, no obstructing lesions found, there was a small 3mm pancreatic cyst and tree in bud opacities in right middle lobe. Empiric ceftriaxone was started. GI Dr. Stroud consulted, MRCP recommended which showed gallbladder duct WNL with no gall stones. Hepatology consulted.  Patient also had thrombocytopenia, no signs of bleeding, normal morphology with no schistocytes. D5w was started for hypernatremia. Lion was discontinued, urine culture was negative. Patient is was downgraded to medicine floor on 2/11. Echo done showing EF 55-60%. PT recs home PT.   CT chest done showing left increased patchy ground-glass opacity.  Given patient's improved clinical status and current hemodynamic stability, decision was made to discharge.  Please refer to patient's complete medical chart with documents for a full hospital course, for this is only a brief summary.

## 2023-02-17 NOTE — PROGRESS NOTE ADULT - PROBLEM SELECTOR PLAN 3
- Likely prerenal 2/2 dehydration  - S/P IVF  - Avoid nephrotoxic agents  - Monitor Scr  - Nephro Dr. Wynn following
plan as above
- Likely prerenal 2/2 dehydration  - S/P IVF  - Avoid nephrotoxic agents  - Monitor Scr  - Nephro Dr. Wynn following
plan as above
plan as above

## 2023-02-17 NOTE — DISCHARGE NOTE PROVIDER - NSDCCAREPROVSEEN_GEN_ALL_CORE_FT
Phuong Torres # SEPSIS S/T SUSPECTED PNEUMONIA - GIVEN ROCEPHIN    # JAUNDICE, HYPERBILIRUBINEMIA - SUSPECT S/T SEPSIS, LEPTOSPIROSIS - D/W HEPATOLOGY AND ID TEAM POST-DISCHARGE ONCE TEST RESULTED AND ANTIBIOTIC RX ORDERED - CALLED PATIENT + DAUGHTER AND RX SENT    # HHS - RESOLVED  # DM  - ON LANTUS, TID INSULIN, SSI + FS  - HBA1C - 9.1  - ENDOCRINOLOGY CONSULT    # KYLE - RESOLVED  # HYPERNATREMIA  - S/P IVF  - NEPHROLOGY CONSULT    # HYPOKALEMIA, HYPOMAGNESEMIA  - REPLETED WITH SUPPLEMENT    # VIT D. DEFICIENCY  # HYPOCALCEMIA  - PLACED ON VIT D AND CALCIUM      Phuong Torres

## 2023-02-17 NOTE — CONSULT NOTE ADULT - PROVIDER SPECIALTY LIST ADULT
Hepatology
Infectious Disease
Internal Medicine
Nephrology
Cardiology
Gastroenterology
Endocrinology

## 2023-02-17 NOTE — PROGRESS NOTE ADULT - ASSESSMENT
66 yo Male, from home, with PMH CAD s/p CABG, DM, HLD presenting with jaundice and lethargy. Patient is lethargic and unable to provide much history. As per ED provider, "his symptoms started about 5 days ago initially with flu-like symptoms and vomiting. went to PMD and was given a nausea medication and felt improved but now feeling worse again. Daughter states jaundice is also getting worse. Has lost about 10-15lbs in the past week."   Patient was noted to have blood glucose > 1000 with severe hypokalemia, KYLE, and elevated bilirubin levels. He was admitted to the ICU for HHS management. He received 6L IVF boluses and potassium supplements   Patient's blood sugar levels improved.  Patient was also found to have elevated bilirubin of 14.5, CT abdomen and pelvis was performed, no obstructing lesions found, there was a small 3mm pancreatic cyst and tree in bud opacities in right middle lobe. Empiric ceftriaxone was started. GI Dr. Stroud consulted, MRCP recommended which showed GBD WNL with no gall stones. Hepatology consulted.  Patient also had thrombocytopenia, no signs of bleeding, normal morphology with no schistocytes. D5w was started for hypernatremia. Lion was discontinued, urine culture was negative. Patient is was downgraded to medicine floors on 2/11    Patient seen at bedside, Generalized juandice persists. CT chest showing ill-defined nonspecific left upper lobe patchy ground-glass opacity with small prabhakar pleural effusion. Pending hepatology reccs.

## 2023-02-17 NOTE — PROGRESS NOTE ADULT - PROBLEM SELECTOR PLAN 9
Pt is from home  PT recs home PT  Disposition is pending hepatology recs  CM following
Pt is from home  Disposition is pending clinical course
Pt is from home  Disposition is pending clinical course

## 2023-02-18 VITALS
HEART RATE: 86 BPM | RESPIRATION RATE: 18 BRPM | OXYGEN SATURATION: 95 % | DIASTOLIC BLOOD PRESSURE: 71 MMHG | TEMPERATURE: 97 F | SYSTOLIC BLOOD PRESSURE: 132 MMHG

## 2023-02-18 LAB
ALBUMIN SERPL ELPH-MCNC: 1.9 G/DL — LOW (ref 3.5–5)
ALP SERPL-CCNC: 156 U/L — HIGH (ref 40–120)
ALT FLD-CCNC: 71 U/L DA — HIGH (ref 10–60)
ANION GAP SERPL CALC-SCNC: 5 MMOL/L — SIGNIFICANT CHANGE UP (ref 5–17)
AST SERPL-CCNC: 41 U/L — HIGH (ref 10–40)
BILIRUB SERPL-MCNC: 3.2 MG/DL — HIGH (ref 0.2–1.2)
BUN SERPL-MCNC: 26 MG/DL — HIGH (ref 7–18)
CALCIUM SERPL-MCNC: 8.3 MG/DL — LOW (ref 8.4–10.5)
CHLORIDE SERPL-SCNC: 105 MMOL/L — SIGNIFICANT CHANGE UP (ref 96–108)
CO2 SERPL-SCNC: 28 MMOL/L — SIGNIFICANT CHANGE UP (ref 22–31)
CREAT SERPL-MCNC: 0.79 MG/DL — SIGNIFICANT CHANGE UP (ref 0.5–1.3)
EBV EA AB SER IA-ACNC: 15.5 U/ML — HIGH
EBV EA AB TITR SER IF: POSITIVE
EBV EA IGG SER-ACNC: POSITIVE
EBV NA IGG SER IA-ACNC: 371 U/ML — HIGH
EBV PATRN SPEC IB-IMP: SIGNIFICANT CHANGE UP
EBV VCA IGG AVIDITY SER QL IA: POSITIVE
EBV VCA IGM SER IA-ACNC: 306 U/ML — HIGH
EBV VCA IGM SER IA-ACNC: 93.2 U/ML — HIGH
EBV VCA IGM TITR FLD: POSITIVE
EGFR: 97 ML/MIN/1.73M2 — SIGNIFICANT CHANGE UP
GLUCOSE BLDC GLUCOMTR-MCNC: 191 MG/DL — HIGH (ref 70–99)
GLUCOSE BLDC GLUCOMTR-MCNC: 231 MG/DL — HIGH (ref 70–99)
GLUCOSE SERPL-MCNC: 211 MG/DL — HIGH (ref 70–99)
HBV CORE AB SER-ACNC: REACTIVE
HCT VFR BLD CALC: 31.6 % — LOW (ref 39–50)
HGB BLD-MCNC: 10.7 G/DL — LOW (ref 13–17)
IGE SERPL-ACNC: 82 KU/L — SIGNIFICANT CHANGE UP
MCHC RBC-ENTMCNC: 31.3 PG — SIGNIFICANT CHANGE UP (ref 27–34)
MCHC RBC-ENTMCNC: 33.9 GM/DL — SIGNIFICANT CHANGE UP (ref 32–36)
MCV RBC AUTO: 92.4 FL — SIGNIFICANT CHANGE UP (ref 80–100)
NRBC # BLD: 0 /100 WBCS — SIGNIFICANT CHANGE UP (ref 0–0)
PLATELET # BLD AUTO: 281 K/UL — SIGNIFICANT CHANGE UP (ref 150–400)
POTASSIUM SERPL-MCNC: 3.7 MMOL/L — SIGNIFICANT CHANGE UP (ref 3.5–5.3)
POTASSIUM SERPL-SCNC: 3.7 MMOL/L — SIGNIFICANT CHANGE UP (ref 3.5–5.3)
PROT SERPL-MCNC: 5.1 G/DL — LOW (ref 6–8.3)
RBC # BLD: 3.42 M/UL — LOW (ref 4.2–5.8)
RBC # FLD: 13.1 % — SIGNIFICANT CHANGE UP (ref 10.3–14.5)
SODIUM SERPL-SCNC: 138 MMOL/L — SIGNIFICANT CHANGE UP (ref 135–145)
WBC # BLD: 6.25 K/UL — SIGNIFICANT CHANGE UP (ref 3.8–10.5)
WBC # FLD AUTO: 6.25 K/UL — SIGNIFICANT CHANGE UP (ref 3.8–10.5)

## 2023-02-18 PROCEDURE — 86704 HEP B CORE ANTIBODY TOTAL: CPT

## 2023-02-18 PROCEDURE — 82962 GLUCOSE BLOOD TEST: CPT

## 2023-02-18 PROCEDURE — 87529 HSV DNA AMP PROBE: CPT

## 2023-02-18 PROCEDURE — 82306 VITAMIN D 25 HYDROXY: CPT

## 2023-02-18 PROCEDURE — 87517 HEPATITIS B DNA QUANT: CPT

## 2023-02-18 PROCEDURE — 84436 ASSAY OF TOTAL THYROXINE: CPT

## 2023-02-18 PROCEDURE — 96376 TX/PRO/DX INJ SAME DRUG ADON: CPT

## 2023-02-18 PROCEDURE — 93306 TTE W/DOPPLER COMPLETE: CPT

## 2023-02-18 PROCEDURE — 86901 BLOOD TYPING SEROLOGIC RH(D): CPT

## 2023-02-18 PROCEDURE — 99285 EMERGENCY DEPT VISIT HI MDM: CPT | Mod: 25

## 2023-02-18 PROCEDURE — 97162 PT EVAL MOD COMPLEX 30 MIN: CPT

## 2023-02-18 PROCEDURE — 85610 PROTHROMBIN TIME: CPT

## 2023-02-18 PROCEDURE — 82785 ASSAY OF IGE: CPT

## 2023-02-18 PROCEDURE — 82310 ASSAY OF CALCIUM: CPT

## 2023-02-18 PROCEDURE — A9585: CPT

## 2023-02-18 PROCEDURE — 82088 ASSAY OF ALDOSTERONE: CPT

## 2023-02-18 PROCEDURE — 82390 ASSAY OF CERULOPLASMIN: CPT

## 2023-02-18 PROCEDURE — 86663 EPSTEIN-BARR ANTIBODY: CPT

## 2023-02-18 PROCEDURE — 84100 ASSAY OF PHOSPHORUS: CPT

## 2023-02-18 PROCEDURE — 86038 ANTINUCLEAR ANTIBODIES: CPT

## 2023-02-18 PROCEDURE — 85025 COMPLETE CBC W/AUTO DIFF WBC: CPT

## 2023-02-18 PROCEDURE — 86664 EPSTEIN-BARR NUCLEAR ANTIGEN: CPT

## 2023-02-18 PROCEDURE — 87522 HEPATITIS C REVRS TRNSCRPJ: CPT

## 2023-02-18 PROCEDURE — 86803 HEPATITIS C AB TEST: CPT

## 2023-02-18 PROCEDURE — 84466 ASSAY OF TRANSFERRIN: CPT

## 2023-02-18 PROCEDURE — 36415 COLL VENOUS BLD VENIPUNCTURE: CPT

## 2023-02-18 PROCEDURE — 82330 ASSAY OF CALCIUM: CPT

## 2023-02-18 PROCEDURE — 84244 ASSAY OF RENIN: CPT

## 2023-02-18 PROCEDURE — 83615 LACTATE (LD) (LDH) ENZYME: CPT

## 2023-02-18 PROCEDURE — 83970 ASSAY OF PARATHORMONE: CPT

## 2023-02-18 PROCEDURE — 74176 CT ABD & PELVIS W/O CONTRAST: CPT | Mod: MA

## 2023-02-18 PROCEDURE — 82607 VITAMIN B-12: CPT

## 2023-02-18 PROCEDURE — 82728 ASSAY OF FERRITIN: CPT

## 2023-02-18 PROCEDURE — 86900 BLOOD TYPING SEROLOGIC ABO: CPT

## 2023-02-18 PROCEDURE — 85730 THROMBOPLASTIN TIME PARTIAL: CPT

## 2023-02-18 PROCEDURE — 86255 FLUORESCENT ANTIBODY SCREEN: CPT

## 2023-02-18 PROCEDURE — 83880 ASSAY OF NATRIURETIC PEPTIDE: CPT

## 2023-02-18 PROCEDURE — 82009 KETONE BODYS QUAL: CPT

## 2023-02-18 PROCEDURE — 81001 URINALYSIS AUTO W/SCOPE: CPT

## 2023-02-18 PROCEDURE — 82140 ASSAY OF AMMONIA: CPT

## 2023-02-18 PROCEDURE — 87635 SARS-COV-2 COVID-19 AMP PRB: CPT

## 2023-02-18 PROCEDURE — 71045 X-RAY EXAM CHEST 1 VIEW: CPT

## 2023-02-18 PROCEDURE — 80053 COMPREHEN METABOLIC PANEL: CPT

## 2023-02-18 PROCEDURE — 86665 EPSTEIN-BARR CAPSID VCA: CPT

## 2023-02-18 PROCEDURE — 82247 BILIRUBIN TOTAL: CPT

## 2023-02-18 PROCEDURE — 84443 ASSAY THYROID STIM HORMONE: CPT

## 2023-02-18 PROCEDURE — 87086 URINE CULTURE/COLONY COUNT: CPT

## 2023-02-18 PROCEDURE — 87641 MR-STAPH DNA AMP PROBE: CPT

## 2023-02-18 PROCEDURE — 87798 DETECT AGENT NOS DNA AMP: CPT

## 2023-02-18 PROCEDURE — 82803 BLOOD GASES ANY COMBINATION: CPT

## 2023-02-18 PROCEDURE — 82787 IGG 1 2 3 OR 4 EACH: CPT

## 2023-02-18 PROCEDURE — 83735 ASSAY OF MAGNESIUM: CPT

## 2023-02-18 PROCEDURE — 86705 HEP B CORE ANTIBODY IGM: CPT

## 2023-02-18 PROCEDURE — 83550 IRON BINDING TEST: CPT

## 2023-02-18 PROCEDURE — 86301 IMMUNOASSAY TUMOR CA 19-9: CPT

## 2023-02-18 PROCEDURE — 87799 DETECT AGENT NOS DNA QUANT: CPT

## 2023-02-18 PROCEDURE — 93005 ELECTROCARDIOGRAM TRACING: CPT

## 2023-02-18 PROCEDURE — 81332 SERPINA1 GENE: CPT

## 2023-02-18 PROCEDURE — 83540 ASSAY OF IRON: CPT

## 2023-02-18 PROCEDURE — 86850 RBC ANTIBODY SCREEN: CPT

## 2023-02-18 PROCEDURE — 71250 CT THORAX DX C-: CPT

## 2023-02-18 PROCEDURE — 87350 HEPATITIS BE AG IA: CPT

## 2023-02-18 PROCEDURE — 87340 HEPATITIS B SURFACE AG IA: CPT

## 2023-02-18 PROCEDURE — 87640 STAPH A DNA AMP PROBE: CPT

## 2023-02-18 PROCEDURE — 86790 VIRUS ANTIBODY NOS: CPT

## 2023-02-18 PROCEDURE — 82378 CARCINOEMBRYONIC ANTIGEN: CPT

## 2023-02-18 PROCEDURE — 83930 ASSAY OF BLOOD OSMOLALITY: CPT

## 2023-02-18 PROCEDURE — 83690 ASSAY OF LIPASE: CPT

## 2023-02-18 PROCEDURE — 74183 MRI ABD W/O CNTR FLWD CNTR: CPT

## 2023-02-18 PROCEDURE — 82105 ALPHA-FETOPROTEIN SERUM: CPT

## 2023-02-18 PROCEDURE — 83605 ASSAY OF LACTIC ACID: CPT

## 2023-02-18 PROCEDURE — 82248 BILIRUBIN DIRECT: CPT

## 2023-02-18 PROCEDURE — 86376 MICROSOMAL ANTIBODY EACH: CPT

## 2023-02-18 PROCEDURE — 80074 ACUTE HEPATITIS PANEL: CPT

## 2023-02-18 PROCEDURE — 84484 ASSAY OF TROPONIN QUANT: CPT

## 2023-02-18 PROCEDURE — 86706 HEP B SURFACE ANTIBODY: CPT

## 2023-02-18 PROCEDURE — 96374 THER/PROPH/DIAG INJ IV PUSH: CPT

## 2023-02-18 PROCEDURE — 80048 BASIC METABOLIC PNL TOTAL CA: CPT

## 2023-02-18 PROCEDURE — 82746 ASSAY OF FOLIC ACID SERUM: CPT

## 2023-02-18 PROCEDURE — 85027 COMPLETE CBC AUTOMATED: CPT

## 2023-02-18 PROCEDURE — 86720 LEPTOSPIRA ANTIBODY: CPT

## 2023-02-18 PROCEDURE — 83036 HEMOGLOBIN GLYCOSYLATED A1C: CPT

## 2023-02-18 PROCEDURE — 86036 ANCA SCREEN EACH ANTIBODY: CPT

## 2023-02-18 PROCEDURE — 86381 MITOCHONDRIAL ANTIBODY EACH: CPT

## 2023-02-18 RX ORDER — INSULIN ASPART 100 [IU]/ML
8 INJECTION, SOLUTION SUBCUTANEOUS
Qty: 720 | Refills: 0
Start: 2023-02-18 | End: 2023-03-19

## 2023-02-18 RX ORDER — CALCIUM CARBONATE 500(1250)
1 TABLET ORAL
Qty: 10 | Refills: 0
Start: 2023-02-18 | End: 2023-02-22

## 2023-02-18 RX ORDER — INSULIN GLARGINE 100 [IU]/ML
12 INJECTION, SOLUTION SUBCUTANEOUS
Qty: 360 | Refills: 0
Start: 2023-02-18 | End: 2023-03-19

## 2023-02-18 RX ORDER — ASPIRIN/CALCIUM CARB/MAGNESIUM 324 MG
1 TABLET ORAL
Qty: 30 | Refills: 0
Start: 2023-02-18 | End: 2023-03-19

## 2023-02-18 RX ORDER — INSULIN LISPRO 100/ML
8 VIAL (ML) SUBCUTANEOUS
Qty: 720 | Refills: 0
Start: 2023-02-18 | End: 2023-03-19

## 2023-02-18 RX ORDER — SITAGLIPTIN AND METFORMIN HYDROCHLORIDE 500; 50 MG/1; MG/1
1 TABLET, FILM COATED ORAL
Qty: 60 | Refills: 0
Start: 2023-02-18 | End: 2023-03-19

## 2023-02-18 RX ORDER — NIFEDIPINE 30 MG
1 TABLET, EXTENDED RELEASE 24 HR ORAL
Qty: 0 | Refills: 0 | DISCHARGE
Start: 2023-02-18

## 2023-02-18 RX ORDER — CEFTRIAXONE 500 MG/1
1000 INJECTION, POWDER, FOR SOLUTION INTRAMUSCULAR; INTRAVENOUS EVERY 24 HOURS
Refills: 0 | Status: DISCONTINUED | OUTPATIENT
Start: 2023-02-18 | End: 2023-02-18

## 2023-02-18 RX ORDER — CHOLECALCIFEROL (VITAMIN D3) 125 MCG
50000 CAPSULE ORAL
Qty: 600012 | Refills: 0
Start: 2023-02-18 | End: 2023-05-12

## 2023-02-18 RX ORDER — MAGNESIUM OXIDE 400 MG ORAL TABLET 241.3 MG
1 TABLET ORAL
Qty: 6 | Refills: 0
Start: 2023-02-18 | End: 2023-02-19

## 2023-02-18 RX ORDER — EMPAGLIFLOZIN 10 MG/1
1 TABLET, FILM COATED ORAL
Qty: 30 | Refills: 0
Start: 2023-02-18 | End: 2023-03-19

## 2023-02-18 RX ADMIN — MAGNESIUM OXIDE 400 MG ORAL TABLET 400 MILLIGRAM(S): 241.3 TABLET ORAL at 09:57

## 2023-02-18 RX ADMIN — Medication 1000 UNIT(S): at 12:39

## 2023-02-18 RX ADMIN — CEFTRIAXONE 100 MILLIGRAM(S): 500 INJECTION, POWDER, FOR SOLUTION INTRAMUSCULAR; INTRAVENOUS at 13:23

## 2023-02-18 RX ADMIN — Medication 1 PACKET(S): at 05:23

## 2023-02-18 RX ADMIN — Medication 2: at 08:36

## 2023-02-18 RX ADMIN — Medication 4: at 12:38

## 2023-02-18 RX ADMIN — Medication 1 TABLET(S): at 05:24

## 2023-02-18 RX ADMIN — Medication 8 UNIT(S): at 08:41

## 2023-02-18 RX ADMIN — Medication 30 MILLIGRAM(S): at 05:23

## 2023-02-18 RX ADMIN — Medication 8 UNIT(S): at 12:38

## 2023-02-18 NOTE — PROGRESS NOTE ADULT - SUBJECTIVE AND OBJECTIVE BOX
C A R D I O L O G Y  **********************************    DATE OF SERVICE: 02-18-23    Patient denies chest pain or shortness of breath.   Review of symptoms otherwise negative.    MEDICATIONS:  calcium carbonate   1250 mG (OsCal) 1 Tablet(s) Oral two times a day  cefTRIAXone   IVPB 1000 milliGRAM(s) IV Intermittent every 24 hours  cholecalciferol 1000 Unit(s) Oral daily  ergocalciferol 29059 Unit(s) Oral <User Schedule>  insulin glargine Injectable (LANTUS) 10 Unit(s) SubCutaneous at bedtime  insulin lispro (ADMELOG) corrective regimen sliding scale   SubCutaneous three times a day before meals  insulin lispro Injectable (ADMELOG) 8 Unit(s) SubCutaneous three times a day before meals  magnesium oxide 400 milliGRAM(s) Oral two times a day with meals  NIFEdipine XL 30 milliGRAM(s) Oral daily  polyethylene glycol 3350 17 Gram(s) Oral daily PRN      LABS:                        10.7   6.25  )-----------( 281      ( 18 Feb 2023 07:04 )             31.6       Hemoglobin: 10.7 g/dL (02-18 @ 07:04)  Hemoglobin: 11.6 g/dL (02-17 @ 06:10)  Hemoglobin: 12.0 g/dL (02-16 @ 06:05)  Hemoglobin: 13.1 g/dL (02-15 @ 06:58)  Hemoglobin: 13.4 g/dL (02-14 @ 06:01)      02-18    138  |  105  |  26<H>  ----------------------------<  211<H>  3.7   |  28  |  0.79    Ca    8.3<L>      18 Feb 2023 07:04  Phos  2.3     02-17  Mg     1.6     02-17    TPro  5.1<L>  /  Alb  1.9<L>  /  TBili  3.2<H>  /  DBili  x   /  AST  41<H>  /  ALT  71<H>  /  AlkPhos  156<H>  02-18    Creatinine Trend: 0.79<--, 0.67<--, 0.88<--, 0.70<--, 0.73<--, 0.99<--    COAGS:           PHYSICAL EXAM:  T(C): 36.6 (02-18-23 @ 05:10), Max: 36.6 (02-17-23 @ 14:26)  HR: 74 (02-18-23 @ 05:10) (74 - 110)  BP: 124/65 (02-18-23 @ 05:10) (106/58 - 124/65)  RR: 18 (02-18-23 @ 05:10) (17 - 22)  SpO2: 99% (02-18-23 @ 05:10) (95% - 99%)  Wt(kg): --    I&O's Summary          HEENT:  (-)pallor  CV: N S1 S2 1/6 DARIEN (+)2 Pulses B/l  Resp:  Clear to ausculatation B/L, normal effort  GI: (+) BS Soft, NT, ND  Lymph:  (-)Edema, (-)obvious lymphadenopathy  Skin: Warm to touch, Normal turgor  Psych: Appropriate mood and affect          ASSESSMENT/PLAN: 	67y  Male   PMH CAD s/p CABG, DM, HLD coming in with jaundice and generalized body aches.    #CAD  - ideally should be on an ASA and statin once plts and LFTs are stable  - echo with no pertinent findings      # ? PAF  - oupt med reconciliation reviewed, d/w medical team he is on Xarelto 2.5 mg PO daily which is th dose for CAD/ PAD (not Afib or DVT)  - Can hold xarelto if it is felt to be contributing to cholestasis     # HTN  - c/w Nifidipine    # Jaundice  - GI f/u  - MRCP noted    Dereck Osuna MD  Pager: [phone]

## 2023-02-18 NOTE — PROGRESS NOTE ADULT - NUTRITIONAL ASSESSMENT
This patient has been assessed with a concern for Malnutrition and has been determined to have a diagnosis/diagnoses of Severe protein-calorie malnutrition and Underweight (BMI < 19).    This patient is being managed with:   Diet Consistent Carbohydrate/No Snacks-  Entered: Feb 15 2023 11:22AM    The following pending diet order is being considered for treatment of Severe protein-calorie malnutrition and Underweight (BMI < 19):  Diet Consistent Carbohydrate w/Evening Snack-  Supplement Feeding Modality:  Oral  Glucerna Shake Cans or Servings Per Day:  1       Frequency:  Two Times a day  Entered: Feb 15 2023  3:56PM  
This patient has been assessed with a concern for Malnutrition and has been determined to have a diagnosis/diagnoses of Severe protein-calorie malnutrition and Underweight (BMI < 19).    This patient is being managed with:   Diet Consistent Carbohydrate/No Snacks-  Entered: Feb 15 2023 11:22AM    The following pending diet order is being considered for treatment of Severe protein-calorie malnutrition and Underweight (BMI < 19):  Diet Consistent Carbohydrate w/Evening Snack-  Supplement Feeding Modality:  Oral  Glucerna Shake Cans or Servings Per Day:  1       Frequency:  Two Times a day  Entered: Feb 15 2023  3:56PM  
This patient has been assessed with a concern for Malnutrition and has been determined to have a diagnosis/diagnoses of Severe protein-calorie malnutrition and Underweight (BMI < 19).    This patient is being managed with:   Diet Clear Liquid-  Consistent Carbohydrate {Evening Snacks}  Entered: Feb 11 2023  9:03PM    
This patient has been assessed with a concern for Malnutrition and has been determined to have a diagnosis/diagnoses of Severe protein-calorie malnutrition and Underweight (BMI < 19).    This patient is being managed with:   Diet Consistent Carbohydrate/No Snacks-  Entered: Feb 15 2023 11:22AM    The following pending diet order is being considered for treatment of Severe protein-calorie malnutrition and Underweight (BMI < 19):  Diet Consistent Carbohydrate w/Evening Snack-  Supplement Feeding Modality:  Oral  Glucerna Shake Cans or Servings Per Day:  1       Frequency:  Two Times a day  Entered: Feb 15 2023  3:56PM  
This patient has been assessed with a concern for Malnutrition and has been determined to have a diagnosis/diagnoses of Severe protein-calorie malnutrition and Underweight (BMI < 19).    This patient is being managed with:   Diet Clear Liquid-  Consistent Carbohydrate {Evening Snacks}  Entered: Feb 11 2023  9:03PM    
This patient has been assessed with a concern for Malnutrition and has been determined to have a diagnosis/diagnoses of Severe protein-calorie malnutrition and Underweight (BMI < 19).    This patient is being managed with:   Diet Consistent Carbohydrate/No Snacks-  Entered: Feb 15 2023 11:22AM    The following pending diet order is being considered for treatment of Severe protein-calorie malnutrition and Underweight (BMI < 19):  Diet Consistent Carbohydrate w/Evening Snack-  Supplement Feeding Modality:  Oral  Glucerna Shake Cans or Servings Per Day:  1       Frequency:  Two Times a day  Entered: Feb 15 2023  3:56PM  

## 2023-02-18 NOTE — PROGRESS NOTE ADULT - PROVIDER SPECIALTY LIST ADULT
Cardiology
Endocrinology
Endocrinology
Internal Medicine
Cardiology
Critical Care
Endocrinology
Internal Medicine
Cardiology
Cardiology
Endocrinology
Hepatology
Hepatology
Internal Medicine
Critical Care
Gastroenterology
Nephrology
Internal Medicine

## 2023-02-18 NOTE — DISCHARGE NOTE NURSING/CASE MANAGEMENT/SOCIAL WORK - PATIENT PORTAL LINK FT
You can access the FollowMyHealth Patient Portal offered by [facility] by registering at the following website: [email or web address]. By joining [facility]’s FollowMyHealth portal, you will also be able to view your health information using other applications (apps) compatible with our system.

## 2023-02-18 NOTE — PROGRESS NOTE ADULT - SUBJECTIVE AND OBJECTIVE BOX
Patient is a 67y old  Male who presents with a chief complaint of hyperosmolar hyperglycemic state (18 Feb 2023 09:02)    PATIENT IS SEEN AND EXAMINED IN MEDICAL FLOOR.    CHUCK [    ]    SHERMAN [   ]      GT [   ]    ALLERGIES:  No Known Allergies      Daily     Daily     VITALS:    Vital Signs Last 24 Hrs  T(C): 36.6 (18 Feb 2023 05:10), Max: 36.6 (17 Feb 2023 14:26)  T(F): 97.8 (18 Feb 2023 05:10), Max: 97.9 (17 Feb 2023 20:18)  HR: 74 (18 Feb 2023 05:10) (74 - 110)  BP: 124/65 (18 Feb 2023 05:10) (106/58 - 124/65)  BP(mean): --  RR: 18 (18 Feb 2023 05:10) (17 - 22)  SpO2: 99% (18 Feb 2023 05:10) (95% - 99%)    Parameters below as of 18 Feb 2023 05:10  Patient On (Oxygen Delivery Method): room air        LABS:    CBC Full  -  ( 18 Feb 2023 07:04 )  WBC Count : 6.25 K/uL  RBC Count : 3.42 M/uL  Hemoglobin : 10.7 g/dL  Hematocrit : 31.6 %  Platelet Count - Automated : 281 K/uL  Mean Cell Volume : 92.4 fl  Mean Cell Hemoglobin : 31.3 pg  Mean Cell Hemoglobin Concentration : 33.9 gm/dL  Auto Neutrophil # : x  Auto Lymphocyte # : x  Auto Monocyte # : x  Auto Eosinophil # : x  Auto Basophil # : x  Auto Neutrophil % : x  Auto Lymphocyte % : x  Auto Monocyte % : x  Auto Eosinophil % : x  Auto Basophil % : x    PT/INR - ( 17 Feb 2023 06:10 )   PT: 12.4 sec;   INR: 1.04 ratio         PTT - ( 17 Feb 2023 06:10 )  PTT:30.5 sec  02-18    138  |  105  |  26<H>  ----------------------------<  211<H>  3.7   |  28  |  0.79    Ca    8.3<L>      18 Feb 2023 07:04  Phos  2.3     02-17  Mg     1.6     02-17    TPro  5.1<L>  /  Alb  1.9<L>  /  TBili  3.2<H>  /  DBili  x   /  AST  41<H>  /  ALT  71<H>  /  AlkPhos  156<H>  02-18    CAPILLARY BLOOD GLUCOSE      POCT Blood Glucose.: 231 mg/dL (18 Feb 2023 11:41)  POCT Blood Glucose.: 191 mg/dL (18 Feb 2023 08:10)  POCT Blood Glucose.: 242 mg/dL (17 Feb 2023 21:11)  POCT Blood Glucose.: 190 mg/dL (17 Feb 2023 17:03)        LIVER FUNCTIONS - ( 18 Feb 2023 07:04 )  Alb: 1.9 g/dL / Pro: 5.1 g/dL / ALK PHOS: 156 U/L / ALT: 71 U/L DA / AST: 41 U/L / GGT: x           Creatinine Trend: 0.79<--, 0.67<--, 0.88<--, 0.70<--, 0.73<--, 0.99<--  I&O's Summary          Clean Catch Clean Catch (Midstream)  02-10 @ 02:35   <10,000 CFU/mL Normal Urogenital Nerissa  --  --          MEDICATIONS:    MEDICATIONS  (STANDING):  calcium carbonate   1250 mG (OsCal) 1 Tablet(s) Oral two times a day  cefTRIAXone   IVPB 1000 milliGRAM(s) IV Intermittent every 24 hours  cholecalciferol 1000 Unit(s) Oral daily  ergocalciferol 53201 Unit(s) Oral <User Schedule>  insulin glargine Injectable (LANTUS) 10 Unit(s) SubCutaneous at bedtime  insulin lispro (ADMELOG) corrective regimen sliding scale   SubCutaneous three times a day before meals  insulin lispro Injectable (ADMELOG) 8 Unit(s) SubCutaneous three times a day before meals  magnesium oxide 400 milliGRAM(s) Oral two times a day with meals  NIFEdipine XL 30 milliGRAM(s) Oral daily      MEDICATIONS  (PRN):  polyethylene glycol 3350 17 Gram(s) Oral daily PRN Constipation        REVIEW OF SYSTEMS:                           ALL ROS DONE [ X   ]      CONSTITUTIONAL:  LETHARGIC [   ], FEVER [   ], UNRESPONSIVE [   ]  CVS:  CP  [   ], SOB, [   ], PALPITATIONS [   ], DIZZYNESS [   ]  RS: COUGH [   ], SPUTUM [   ]  GI: ABDOMINAL PAIN [   ], NAUSEA [   ], VOMITINGS [   ], DIARRHEA [   ], CONSTIPATION [   ]  :  DYSURIA [   ], NOCTURIA [   ], INCREASED FREQUENCY [   ], DRIBLING [   ],  SKELETAL: PAINFUL JOINTS [   ], SWOLLEN JOINTS [   ], NECK ACHE [   ], LOW BACK ACHE [   ],  SKIN : ULCERS [   ], RASH [   ], ITCHING [   ]  CNS: HEAD ACHE [   ], DOUBLE VISION [   ], BLURRED VISION [   ], AMS / CONFUSION [   ], SEIZURES [   ], WEAKNESS [   ],TINGLING / NUMBNESS [   ]        PHYSICAL EXAMINATION:    GENERAL APPEARANCE: NO DISTRESS  HEENT:  NO PALLOR, NO  JVD,  NO   NODES, NECK SUPPLE  CVS: S1 +, S2 +,   RS: AEEB,  OCCASIONAL  RALES +,   NO RONCHI  ABD: SOFT, NO, BS +   ;  TTP IN EPIGASTRIC/LUQ REGION +  EXT: NO PE  SKIN: WARM,  JAUNDICE  SKELETAL:  ROM ACCEPTABLE  CNS:  AAO X 2-3            RADIOLOGY :    RADIOLOGY AND READINGS REVIEWED    ASSESSMENT :       PLAN:  HPI:  67 year old male PMH CAD s/p CABG, DM, HLD coming in with jaundice and generalized body aches. Patient is lethargic and unable to provide much history. As per ED provider, " his symptoms started about 5 days ago initially with flu-like symptoms and vomiting. went to PMD and was given a nausea medication and felt improved but now feeling worse again. daughter states jaundice is also getting worse. has lost about 10-15lbs in the past week."     (10 Feb 2023 02:52)    # CASE D/W PATIENT'S DAUGHTER FINESSE ORLANDO [ALSO @ [phone]] VIA PATIENT'S PHONE AT BEDSIDE ALL QUESTIONS ANSWERED [2/17]    # D/C TOMORROW IF MEDICALLY STABLE AND CLEARED BY HEPATOLOGY, ID TEAMS ; CM COORDINATING WITH FAMILY    # SEPSIS S/T SUSPECTED PNEUMONIA  - ON ROCEPHIN  - NOTED CT CHEST  - ID CONSULT    # JAUNDICE, HYPERBILIRUBINEMIA - ? S/T SEPSIS  - NOTED CT A/P  - NOTED MRCP - NEGATIVE FOR OBSTRUCTION  - F/U HEPATITIS PANEL, AUTOIMMUNE WORKUP, TUMOR MARKERS, IRON PANEL  - PATIENT REPORTS ONLY SUPPLEMENT HE USES IS GINSENG  - AVOID HEPATOTOXIC AGENTS  - GI CONSULT IN PROGRESS  - HEPATOLOGY CONSULT IN PROGRESS    - D/W HEPATOLOGY, REGARDING BIOPSY, WILL CONTINUE TO TREND LFTS, RECOMMENDS DEFERRING BIOPSY GIVEN IMPROVEMENT. RECOMMENDS FURTHER OUTPATIENT F/U     # HHS - RESOLVED  # DM  - ON LANTUS, TID INSULIN, SSI + FS  - HBA1C - 9.1  - ENDOCRINOLOGY CONSULT PLACED    # KYLE - RESOLVED  # HYPERNATREMIA  - S/P IVF  - STRICT IS AN OS  - MONITOR CR  - AVOID NEPHROTOXIC AGENTS  - NEPHROLOGY CONSULT    # HYPOKALEMIA, HYPOMAGNESEMIA  - REPLETING WITH SUPPLEMENT    # CAD s/p CABG  # HTN  - ON PLAVIX AND XARELTO 2.5 MG ; D/W HEPATOLOGY AND W/ CARDIOLOGY. UNABLE TO GET IN TOUCH WITH PCP DESPITE MULTIPLE CALLS. PATIENT DENIES HX OF ARRHYTHMIA OR DVT. PER CARDIOLOGY, THIS DOSE OF XARELTO IS USUALLY FOR CAD/PAD. GIVEN TRANSAMINITIS, RECOMMENDED FOR SWITCHING XARELTO AND PLAVIX AS THESE ARE CHOLESTATIC. CARDIOLOGY RECOMMENDS ASPIRIN.  - ON NIFEDIPINE    # HYPOCALCEMIA - IMPROVED  # VIT D. DEFICIENCY  - F/U IONIZED CALCIUM  - NOTED PTH, 25OH VIT D  - PLACED ON VIT D AND CALCIUM    # HYPOMAGNESEMIA  - REPLETING WITH SUPPLEMENT    # HLD - ON STATIN, HOLD    # GI PPX    DR. LILIANE ZAMORA

## 2023-02-18 NOTE — PROGRESS NOTE ADULT - REASON FOR ADMISSION
hyperosmolar hyperglycemic state

## 2023-02-18 NOTE — DISCHARGE NOTE NURSING/CASE MANAGEMENT/SOCIAL WORK - [id number]_GEN_ALL_CORE
For information on Fall & Injury Prevention, visit: [email or web address] OR  [email or web address] OR  https://www.cdc.gov/steadi/patient.html

## 2023-02-20 LAB
ANNOTATION COMMENT IMP: SIGNIFICANT CHANGE UP
HEV RNA SERPL NAA+PROBE-ACNC: SIGNIFICANT CHANGE UP IU/ML
HEV RNA SERPL NAA+PROBE-LOG IU: <3.3 LOG IU/ML — SIGNIFICANT CHANGE UP
LEPTOSPIRA AB TITR SER: POSITIVE
SPECIMEN SOURCE: SIGNIFICANT CHANGE UP

## 2023-02-21 LAB
AUTO DIFF PNL BLD: NEGATIVE — SIGNIFICANT CHANGE UP
C-ANCA SER-ACNC: NEGATIVE — SIGNIFICANT CHANGE UP
HEV IGM SER QL: SIGNIFICANT CHANGE UP
MITOCHONDRIA AB SER-ACNC: SIGNIFICANT CHANGE UP
MITOCHONDRIA AB SER-ACNC: SIGNIFICANT CHANGE UP
P-ANCA SER-ACNC: NEGATIVE — SIGNIFICANT CHANGE UP
SMOOTH MUSCLE AB SER-ACNC: SIGNIFICANT CHANGE UP

## 2023-02-22 LAB
ANNOTATION COMMENT IMP: SIGNIFICANT CHANGE UP
CA-I BLD-SCNC: 3.6 MG/DL — LOW (ref 4.5–5.6)
CA-I BLD-SCNC: 3.7 MG/DL — LOW (ref 4.5–5.6)
ELECTRONIC SIGNATURE: SIGNIFICANT CHANGE UP
HEV AB FLD QL: POSITIVE
IGG SERPL-MCNC: 878 MG/DL — SIGNIFICANT CHANGE UP (ref 603–1613)
IGG1 SER-MCNC: 339 MG/DL — SIGNIFICANT CHANGE UP (ref 248–810)
IGG2 SER-MCNC: 343 MG/DL — SIGNIFICANT CHANGE UP (ref 130–555)
IGG3 SER-MCNC: 80 MG/DL — SIGNIFICANT CHANGE UP (ref 15–102)
IGG4 SER-MCNC: 30 MG/DL — SIGNIFICANT CHANGE UP (ref 2–96)
SERPINA1 GENE MUT TESTED BLD/T: SIGNIFICANT CHANGE UP

## 2023-02-23 LAB — VZV DNA, PCR RESULT: NEGATIVE — SIGNIFICANT CHANGE UP

## 2024-06-21 NOTE — PROGRESS NOTE ADULT - SUBJECTIVE AND OBJECTIVE BOX
INTERVAL HPI/OVERNIGHT EVENTS: No acute events noted overnight.     PRESSORS: [ ] YES [X ] NO  WHICH:    ANTIBIOTICS:                  DATE STARTED:  ANTIBIOTICS:                  DATE STARTED:  ANTIBIOTICS:                  DATE STARTED:    Antimicrobial:  cefTRIAXone   IVPB      cefTRIAXone   IVPB 1000 milliGRAM(s) IV Intermittent every 24 hours    Cardiovascular:  NIFEdipine XL 30 milliGRAM(s) Oral daily    Pulmonary:    Hematalogic:    Other:  allopurinol 100 milliGRAM(s) Oral daily  atorvastatin 20 milliGRAM(s) Oral at bedtime  chlorhexidine 2% Cloths 1 Application(s) Topical <User Schedule>  dextrose 5%. 1000 milliLiter(s) IV Continuous <Continuous>  dextrose 5%. 1000 milliLiter(s) IV Continuous <Continuous>  dextrose 50% Injectable 50 milliLiter(s) IV Push every 15 minutes  dextrose 50% Injectable 25 milliLiter(s) IV Push every 15 minutes  dextrose Oral Gel 15 Gram(s) Oral once PRN  glucagon  Injectable 1 milliGRAM(s) IntraMuscular once  insulin lispro (ADMELOG) corrective regimen sliding scale   SubCutaneous every 4 hours  potassium chloride   Powder 40 milliEquivalent(s) Oral every 4 hours      Drug Dosing Weight  Height (cm): 172.7 (2023 22:42)  Weight (kg): 59.3 (10 Feb 2023 06:18)  BMI (kg/m2): 19.9 (10 Feb 2023 06:18)  BSA (m2): 1.71 (10 Feb 2023 06:18)    CENTRAL LINE: [ ] YES [ ] NO  LOCATION:   DATE INSERTED:  REMOVE: [ ] YES [ ] NO  EXPLAIN:    WHITAKER: [ ] YES [ ] NO    DATE INSERTED:  REMOVE:  [ ] YES [ ] NO  EXPLAIN:    A-LINE:  [ ] YES [ ] NO  LOCATION:   DATE INSERTED:  REMOVE:  [ ] YES [ ] NO  EXPLAIN:    PMH/Social Hx/Fam Hx -reviewed admission note, no change since admission  PAST MEDICAL & SURGICAL HISTORY:  CAD (coronary artery disease)      Diabetes mellitus      HLD (hyperlipidemia)      T(C): 36.6 (02-10-23 @ 23:18), Max: 36.7 (02-10-23 @ 03:41)  HR: 69 (23 @ 00:00)  BP: 125/73 (23 @ 00:00)  BP(mean): 85 (23 @ 00:00)  ABP: --  ABP(mean): --  RR: 19 (23 @ 00:00)  SpO2: 96% (23 @ 00:00)  Wt(kg): --           @ 07:01  -  02-10 @ 07:00  --------------------------------------------------------  IN: 250 mL / OUT: 950 mL / NET: -700 mL            PHYSICAL EXAM:  GENERAL: No acute distress   HEAD:  Atraumatic, Normocephalic  EYES: scleral icterus   ENMT: No tonsillar erythema, exudates, or enlargement; Moist mucous membranes  NECK: Supple, No JVD, Normal thyroid  HEART: Regular rate and rhythm; No murmurs, rubs, or gallops  RESPIRATORY: CTA B/L, No W/R/R  ABDOMEN: Soft, Nontender, Nondistended, no hepatosplenomegaly; Bowel sounds present  NEUROLOGY: A&Ox3, nonfocal, moving all extremities  EXTREMITIES:  2+ Peripheral Pulses, No clubbing, cyanosis, or edema  SKIN: jaundiced, warm, dry, no rash or abnormal lesions    LABS:  CBC Full  -  ( 10 Feb 2023 12:37 )  WBC Count : 7.51 K/uL  RBC Count : 4.22 M/uL  Hemoglobin : 13.3 g/dL  Hematocrit : 39.2 %  Platelet Count - Automated : 55 K/uL  Mean Cell Volume : 92.9 fl  Mean Cell Hemoglobin : 31.5 pg  Mean Cell Hemoglobin Concentration : 33.9 gm/dL  Auto Neutrophil # : x  Auto Lymphocyte # : x  Auto Monocyte # : x  Auto Eosinophil # : x  Auto Basophil # : x  Auto Neutrophil % : x  Auto Lymphocyte % : x  Auto Monocyte % : x  Auto Eosinophil % : x  Auto Basophil % : x    02-10    159<H>  |  123<H>  |  85<H>  ----------------------------<  283<H>  3.3<L>   |  30  |  1.95<H>    Ca    7.3<L>      10 Feb 2023 21:00  Phos  1.8     -10  Mg     2.2     02-10    TPro  5.1<L>  /  Alb  1.8<L>  /  TBili  10.2<H>  /  DBili  8.6<H>  /  AST  28  /  ALT  63<H>  /  AlkPhos  143<H>  10    PT/INR - ( 10 Feb 2023 00:23 )   PT: 13.2 sec;   INR: 1.11 ratio         PTT - ( 10 Feb 2023 00:23 )  PTT:34.5 sec  Urinalysis Basic - ( 10 Feb 2023 02:35 )    Color: Yellow / Appearance: Clear / S.010 / pH: x  Gluc: x / Ketone: Trace  / Bili: Small / Urobili: 1   Blood: x / Protein: 15 / Nitrite: Negative   Leuk Esterase: Negative / RBC: 2-5 /HPF / WBC 0-2 /HPF   Sq Epi: x / Non Sq Epi: Occasional /HPF / Bacteria: Trace /HPF          RADIOLOGY & ADDITIONAL STUDIES REVIEWED:      [ ]GOALS OF CARE DISCUSSION WITH PATIENT/FAMILY/PROXY:    CRITICAL CARE TIME SPENT: 35 minutes normocephalic, atraumatic INTERVAL HPI/OVERNIGHT EVENTS: No acute events noted overnight.     PRESSORS: [ ] YES [X ] NO  WHICH:    ANTIBIOTICS:                  DATE STARTED:  ANTIBIOTICS:                  DATE STARTED:  ANTIBIOTICS:                  DATE STARTED:    Antimicrobial:  cefTRIAXone   IVPB      cefTRIAXone   IVPB 1000 milliGRAM(s) IV Intermittent every 24 hours    Cardiovascular:  NIFEdipine XL 30 milliGRAM(s) Oral daily    Pulmonary:    Hematalogic:    Other:  allopurinol 100 milliGRAM(s) Oral daily  atorvastatin 20 milliGRAM(s) Oral at bedtime  chlorhexidine 2% Cloths 1 Application(s) Topical <User Schedule>  dextrose 5%. 1000 milliLiter(s) IV Continuous <Continuous>  dextrose 5%. 1000 milliLiter(s) IV Continuous <Continuous>  dextrose 50% Injectable 50 milliLiter(s) IV Push every 15 minutes  dextrose 50% Injectable 25 milliLiter(s) IV Push every 15 minutes  dextrose Oral Gel 15 Gram(s) Oral once PRN  glucagon  Injectable 1 milliGRAM(s) IntraMuscular once  insulin lispro (ADMELOG) corrective regimen sliding scale   SubCutaneous every 4 hours  potassium chloride   Powder 40 milliEquivalent(s) Oral every 4 hours      Drug Dosing Weight  Height (cm): 172.7 (2023 22:42)  Weight (kg): 59.3 (10 Feb 2023 06:18)  BMI (kg/m2): 19.9 (10 Feb 2023 06:18)  BSA (m2): 1.71 (10 Feb 2023 06:18)    CENTRAL LINE: [ ] YES [ ] NO  LOCATION:   DATE INSERTED:  REMOVE: [ ] YES [ ] NO  EXPLAIN:    WHITAKER: [ ] YES [ ] NO    DATE INSERTED:  REMOVE:  [ ] YES [ ] NO  EXPLAIN:    A-LINE:  [ ] YES [ ] NO  LOCATION:   DATE INSERTED:  REMOVE:  [ ] YES [ ] NO  EXPLAIN:    PMH/Social Hx/Fam Hx -reviewed admission note, no change since admission  PAST MEDICAL & SURGICAL HISTORY:  CAD (coronary artery disease)      Diabetes mellitus      HLD (hyperlipidemia)      T(C): 36.6 (02-10-23 @ 23:18), Max: 36.7 (02-10-23 @ 03:41)  HR: 69 (23 @ 00:00)  BP: 125/73 (23 @ 00:00)  BP(mean): 85 (23 @ 00:00)  ABP: --  ABP(mean): --  RR: 19 (23 @ 00:00)  SpO2: 96% (23 @ 00:00)  Wt(kg): --           @ 07:01  -  02-10 @ 07:00  --------------------------------------------------------  IN: 250 mL / OUT: 950 mL / NET: -700 mL            PHYSICAL EXAM:  GENERAL: No acute distress   HEAD:  Atraumatic, Normocephalic  EYES: scleral icterus   ENMT: No tonsillar erythema, exudates, or enlargement; Moist mucous membranes  NECK: Supple, No JVD, Normal thyroid  HEART: Regular rate and rhythm; No murmurs, rubs, or gallops  RESPIRATORY: CTA B/L, No W/R/R  ABDOMEN: Epigastric tenderness Nondistended, no hepatosplenomegaly; Bowel sounds present  NEUROLOGY: A&Ox3, nonfocal, moving all extremities  EXTREMITIES:  2+ Peripheral Pulses, No clubbing, cyanosis, or edema  SKIN: jaundiced, warm, dry, no rash or abnormal lesions    LABS:  CBC Full  -  ( 10 Feb 2023 12:37 )  WBC Count : 7.51 K/uL  RBC Count : 4.22 M/uL  Hemoglobin : 13.3 g/dL  Hematocrit : 39.2 %  Platelet Count - Automated : 55 K/uL  Mean Cell Volume : 92.9 fl  Mean Cell Hemoglobin : 31.5 pg  Mean Cell Hemoglobin Concentration : 33.9 gm/dL  Auto Neutrophil # : x  Auto Lymphocyte # : x  Auto Monocyte # : x  Auto Eosinophil # : x  Auto Basophil # : x  Auto Neutrophil % : x  Auto Lymphocyte % : x  Auto Monocyte % : x  Auto Eosinophil % : x  Auto Basophil % : x    02-10    159<H>  |  123<H>  |  85<H>  ----------------------------<  283<H>  3.3<L>   |  30  |  1.95<H>    Ca    7.3<L>      10 Feb 2023 21:00  Phos  1.8     02-10  Mg     2.2     02-10    TPro  5.1<L>  /  Alb  1.8<L>  /  TBili  10.2<H>  /  DBili  8.6<H>  /  AST  28  /  ALT  63<H>  /  AlkPhos  143<H>  02-10    PT/INR - ( 10 Feb 2023 00:23 )   PT: 13.2 sec;   INR: 1.11 ratio         PTT - ( 10 Feb 2023 00:23 )  PTT:34.5 sec  Urinalysis Basic - ( 10 Feb 2023 02:35 )    Color: Yellow / Appearance: Clear / S.010 / pH: x  Gluc: x / Ketone: Trace  / Bili: Small / Urobili: 1   Blood: x / Protein: 15 / Nitrite: Negative   Leuk Esterase: Negative / RBC: 2-5 /HPF / WBC 0-2 /HPF   Sq Epi: x / Non Sq Epi: Occasional /HPF / Bacteria: Trace /HPF          RADIOLOGY & ADDITIONAL STUDIES REVIEWED:      [ ]GOALS OF CARE DISCUSSION WITH PATIENT/FAMILY/PROXY:    CRITICAL CARE TIME SPENT: 35 minutes